# Patient Record
Sex: FEMALE | Race: BLACK OR AFRICAN AMERICAN | NOT HISPANIC OR LATINO | Employment: FULL TIME | URBAN - METROPOLITAN AREA
[De-identification: names, ages, dates, MRNs, and addresses within clinical notes are randomized per-mention and may not be internally consistent; named-entity substitution may affect disease eponyms.]

---

## 2021-05-10 ENCOUNTER — TELEPHONE (OUTPATIENT)
Dept: PRIMARY CARE CLINIC | Facility: CLINIC | Age: 46
End: 2021-05-10

## 2021-05-10 ENCOUNTER — OFFICE VISIT (OUTPATIENT)
Dept: PRIMARY CARE CLINIC | Facility: CLINIC | Age: 46
End: 2021-05-10
Payer: MEDICAID

## 2021-05-10 DIAGNOSIS — Z13.6 SCREENING FOR CARDIOVASCULAR CONDITION: ICD-10-CM

## 2021-05-10 DIAGNOSIS — N89.8 VAGINAL DISCHARGE: ICD-10-CM

## 2021-05-10 DIAGNOSIS — N76.0 BACTERIAL VAGINITIS: Primary | ICD-10-CM

## 2021-05-10 DIAGNOSIS — M62.838 MUSCLE SPASM: ICD-10-CM

## 2021-05-10 DIAGNOSIS — B96.89 BACTERIAL VAGINITIS: Primary | ICD-10-CM

## 2021-05-10 DIAGNOSIS — Z11.4 SCREENING FOR HIV (HUMAN IMMUNODEFICIENCY VIRUS): ICD-10-CM

## 2021-05-10 DIAGNOSIS — Z86.39 PERSONAL HISTORY OF ENDOCRINE DISORDER: ICD-10-CM

## 2021-05-10 DIAGNOSIS — R53.83 FATIGUE, UNSPECIFIED TYPE: ICD-10-CM

## 2021-05-10 DIAGNOSIS — I10 ESSENTIAL HYPERTENSION: ICD-10-CM

## 2021-05-10 DIAGNOSIS — Z11.59 ENCOUNTER FOR HEPATITIS C SCREENING TEST FOR LOW RISK PATIENT: ICD-10-CM

## 2021-05-10 DIAGNOSIS — Z11.3 SCREENING EXAMINATION FOR VENEREAL DISEASE: ICD-10-CM

## 2021-05-10 DIAGNOSIS — Z91.119 NONCOMPLIANCE OF PATIENT WITH DIETARY REGIMEN: ICD-10-CM

## 2021-05-10 DIAGNOSIS — Z72.89 OTHER PROBLEMS RELATED TO LIFESTYLE: ICD-10-CM

## 2021-05-10 PROBLEM — M75.101 ROTATOR CUFF TEAR, NON-TRAUMATIC, RIGHT: Status: ACTIVE | Noted: 2021-03-16

## 2021-05-10 PROBLEM — F33.1 MODERATE EPISODE OF RECURRENT MAJOR DEPRESSIVE DISORDER: Status: ACTIVE | Noted: 2020-08-05

## 2021-05-10 PROBLEM — M25.512 CHRONIC LEFT SHOULDER PAIN: Status: ACTIVE | Noted: 2020-08-05

## 2021-05-10 PROBLEM — F17.200 SMOKER: Status: ACTIVE | Noted: 2021-05-10

## 2021-05-10 PROBLEM — G89.29 CHRONIC LEFT SHOULDER PAIN: Status: ACTIVE | Noted: 2020-08-05

## 2021-05-10 PROCEDURE — 99204 OFFICE O/P NEW MOD 45 MIN: CPT | Mod: 95,,, | Performed by: NURSE PRACTITIONER

## 2021-05-10 PROCEDURE — 99204 PR OFFICE/OUTPT VISIT, NEW, LEVL IV, 45-59 MIN: ICD-10-PCS | Mod: 95,,, | Performed by: NURSE PRACTITIONER

## 2021-05-10 RX ORDER — IBUPROFEN 800 MG/1
800 TABLET ORAL EVERY 8 HOURS PRN
COMMUNITY
Start: 2021-04-27 | End: 2021-06-17 | Stop reason: SDUPTHER

## 2021-05-10 RX ORDER — VARENICLINE TARTRATE 1 MG/1
1 TABLET, FILM COATED ORAL
COMMUNITY
Start: 2021-03-04 | End: 2021-06-02

## 2021-05-10 RX ORDER — PANTOPRAZOLE SODIUM 20 MG/1
20 TABLET, DELAYED RELEASE ORAL
COMMUNITY
Start: 2021-03-04 | End: 2023-04-28 | Stop reason: SDUPTHER

## 2021-05-10 RX ORDER — TIZANIDINE 4 MG/1
4 TABLET ORAL EVERY 8 HOURS
Qty: 90 TABLET | Refills: 1 | Status: SHIPPED | OUTPATIENT
Start: 2021-05-10 | End: 2021-07-14

## 2021-05-10 RX ORDER — ALBUTEROL SULFATE 90 UG/1
2 AEROSOL, METERED RESPIRATORY (INHALATION) EVERY 4 HOURS PRN
COMMUNITY
Start: 2020-11-21 | End: 2023-04-27 | Stop reason: SDUPTHER

## 2021-05-10 RX ORDER — AMLODIPINE BESYLATE 5 MG/1
5 TABLET ORAL
COMMUNITY
Start: 2021-03-04 | End: 2022-07-22 | Stop reason: SDUPTHER

## 2021-05-11 ENCOUNTER — TELEPHONE (OUTPATIENT)
Dept: PRIMARY CARE CLINIC | Facility: CLINIC | Age: 46
End: 2021-05-11

## 2021-05-11 RX ORDER — METRONIDAZOLE 500 MG/1
500 TABLET ORAL EVERY 12 HOURS
Qty: 14 TABLET | Refills: 0 | Status: SHIPPED | OUTPATIENT
Start: 2021-05-11 | End: 2021-05-18

## 2021-06-15 ENCOUNTER — LAB VISIT (OUTPATIENT)
Dept: LAB | Facility: HOSPITAL | Age: 46
End: 2021-06-15
Attending: NURSE PRACTITIONER
Payer: MEDICAID

## 2021-06-15 DIAGNOSIS — I10 ESSENTIAL HYPERTENSION: ICD-10-CM

## 2021-06-15 DIAGNOSIS — Z13.6 SCREENING FOR CARDIOVASCULAR CONDITION: ICD-10-CM

## 2021-06-15 DIAGNOSIS — Z11.4 SCREENING FOR HIV (HUMAN IMMUNODEFICIENCY VIRUS): ICD-10-CM

## 2021-06-15 DIAGNOSIS — R53.83 FATIGUE, UNSPECIFIED TYPE: ICD-10-CM

## 2021-06-15 DIAGNOSIS — Z72.89 OTHER PROBLEMS RELATED TO LIFESTYLE: ICD-10-CM

## 2021-06-15 DIAGNOSIS — Z86.39 PERSONAL HISTORY OF ENDOCRINE DISORDER: ICD-10-CM

## 2021-06-15 DIAGNOSIS — Z11.3 SCREENING EXAMINATION FOR VENEREAL DISEASE: ICD-10-CM

## 2021-06-15 LAB
ALBUMIN SERPL BCP-MCNC: 4.1 G/DL (ref 3.5–5.2)
ALP SERPL-CCNC: 55 U/L (ref 55–135)
ALT SERPL W/O P-5'-P-CCNC: 12 U/L (ref 10–44)
ANION GAP SERPL CALC-SCNC: 10 MMOL/L (ref 8–16)
AST SERPL-CCNC: 14 U/L (ref 10–40)
BASOPHILS # BLD AUTO: 0.03 K/UL (ref 0–0.2)
BASOPHILS NFR BLD: 0.4 % (ref 0–1.9)
BILIRUB SERPL-MCNC: 0.3 MG/DL (ref 0.1–1)
BUN SERPL-MCNC: 9 MG/DL (ref 6–20)
CALCIUM SERPL-MCNC: 9 MG/DL (ref 8.7–10.5)
CHLORIDE SERPL-SCNC: 106 MMOL/L (ref 95–110)
CO2 SERPL-SCNC: 22 MMOL/L (ref 23–29)
CREAT SERPL-MCNC: 0.7 MG/DL (ref 0.5–1.4)
DIFFERENTIAL METHOD: NORMAL
EOSINOPHIL # BLD AUTO: 0.1 K/UL (ref 0–0.5)
EOSINOPHIL NFR BLD: 1.6 % (ref 0–8)
ERYTHROCYTE [DISTWIDTH] IN BLOOD BY AUTOMATED COUNT: 14 % (ref 11.5–14.5)
EST. GFR  (AFRICAN AMERICAN): >60 ML/MIN/1.73 M^2
EST. GFR  (NON AFRICAN AMERICAN): >60 ML/MIN/1.73 M^2
GLUCOSE SERPL-MCNC: 99 MG/DL (ref 70–110)
HCT VFR BLD AUTO: 39.1 % (ref 37–48.5)
HGB BLD-MCNC: 12.5 G/DL (ref 12–16)
HIV 1+2 AB+HIV1 P24 AG SERPL QL IA: NEGATIVE
IMM GRANULOCYTES # BLD AUTO: 0.02 K/UL (ref 0–0.04)
IMM GRANULOCYTES NFR BLD AUTO: 0.3 % (ref 0–0.5)
LYMPHOCYTES # BLD AUTO: 1.9 K/UL (ref 1–4.8)
LYMPHOCYTES NFR BLD: 26.4 % (ref 18–48)
MCH RBC QN AUTO: 29.1 PG (ref 27–31)
MCHC RBC AUTO-ENTMCNC: 32 G/DL (ref 32–36)
MCV RBC AUTO: 91 FL (ref 82–98)
MONOCYTES # BLD AUTO: 0.4 K/UL (ref 0.3–1)
MONOCYTES NFR BLD: 6.1 % (ref 4–15)
NEUTROPHILS # BLD AUTO: 4.6 K/UL (ref 1.8–7.7)
NEUTROPHILS NFR BLD: 65.2 % (ref 38–73)
NRBC BLD-RTO: 0 /100 WBC
PLATELET # BLD AUTO: 313 K/UL (ref 150–450)
PMV BLD AUTO: 10.4 FL (ref 9.2–12.9)
POTASSIUM SERPL-SCNC: 3.7 MMOL/L (ref 3.5–5.1)
PROT SERPL-MCNC: 7.4 G/DL (ref 6–8.4)
RBC # BLD AUTO: 4.29 M/UL (ref 4–5.4)
SODIUM SERPL-SCNC: 138 MMOL/L (ref 136–145)
TRIGL SERPL-MCNC: 105 MG/DL (ref 30–150)
WBC # BLD AUTO: 7.09 K/UL (ref 3.9–12.7)

## 2021-06-15 PROCEDURE — 84443 ASSAY THYROID STIM HORMONE: CPT | Performed by: NURSE PRACTITIONER

## 2021-06-15 PROCEDURE — 80074 ACUTE HEPATITIS PANEL: CPT | Performed by: NURSE PRACTITIONER

## 2021-06-15 PROCEDURE — 36415 COLL VENOUS BLD VENIPUNCTURE: CPT | Mod: PN | Performed by: NURSE PRACTITIONER

## 2021-06-15 PROCEDURE — 86592 SYPHILIS TEST NON-TREP QUAL: CPT | Performed by: NURSE PRACTITIONER

## 2021-06-15 PROCEDURE — 80061 LIPID PANEL: CPT | Performed by: NURSE PRACTITIONER

## 2021-06-15 PROCEDURE — 80053 COMPREHEN METABOLIC PANEL: CPT | Performed by: NURSE PRACTITIONER

## 2021-06-15 PROCEDURE — 85025 COMPLETE CBC W/AUTO DIFF WBC: CPT | Performed by: NURSE PRACTITIONER

## 2021-06-15 PROCEDURE — 86703 HIV-1/HIV-2 1 RESULT ANTBDY: CPT | Performed by: NURSE PRACTITIONER

## 2021-06-16 LAB
RPR SER QL: NORMAL
TSH SERPL DL<=0.005 MIU/L-ACNC: 0.79 UIU/ML (ref 0.4–4)

## 2021-06-17 ENCOUNTER — OFFICE VISIT (OUTPATIENT)
Dept: PRIMARY CARE CLINIC | Facility: CLINIC | Age: 46
End: 2021-06-17
Payer: MEDICAID

## 2021-06-17 DIAGNOSIS — G62.9 NEUROPATHY: ICD-10-CM

## 2021-06-17 DIAGNOSIS — M25.512 CHRONIC LEFT SHOULDER PAIN: ICD-10-CM

## 2021-06-17 DIAGNOSIS — G89.29 CHRONIC LEFT SHOULDER PAIN: ICD-10-CM

## 2021-06-17 DIAGNOSIS — F17.210 CIGARETTE NICOTINE DEPENDENCE WITHOUT COMPLICATION: Primary | ICD-10-CM

## 2021-06-17 DIAGNOSIS — F41.8 DEPRESSION WITH ANXIETY: ICD-10-CM

## 2021-06-17 LAB
HAV IGM SERPL QL IA: NEGATIVE
HBV CORE IGM SERPL QL IA: NEGATIVE
HBV SURFACE AG SERPL QL IA: NEGATIVE
HCV AB SERPL QL IA: NEGATIVE

## 2021-06-17 PROCEDURE — 99213 OFFICE O/P EST LOW 20 MIN: CPT | Mod: 95,,, | Performed by: NURSE PRACTITIONER

## 2021-06-17 PROCEDURE — 99213 PR OFFICE/OUTPT VISIT, EST, LEVL III, 20-29 MIN: ICD-10-PCS | Mod: 95,,, | Performed by: NURSE PRACTITIONER

## 2021-06-17 RX ORDER — IBUPROFEN 800 MG/1
800 TABLET ORAL EVERY 8 HOURS PRN
Qty: 60 TABLET | Refills: 3 | Status: SHIPPED | OUTPATIENT
Start: 2021-06-17 | End: 2021-06-17

## 2021-06-17 RX ORDER — BUPROPION HYDROCHLORIDE 150 MG/1
TABLET ORAL
Qty: 49 TABLET | Refills: 3 | Status: SHIPPED | OUTPATIENT
Start: 2021-06-17 | End: 2022-08-24 | Stop reason: ALTCHOICE

## 2021-06-17 RX ORDER — IBUPROFEN 800 MG/1
800 TABLET ORAL EVERY 8 HOURS PRN
Qty: 60 TABLET | Refills: 0 | Status: SHIPPED | OUTPATIENT
Start: 2021-06-17 | End: 2023-04-27 | Stop reason: SDUPTHER

## 2021-06-17 RX ORDER — IBUPROFEN 800 MG/1
800 TABLET ORAL 3 TIMES DAILY
Qty: 30 TABLET | Refills: 0 | Status: SHIPPED | OUTPATIENT
Start: 2021-06-17 | End: 2021-06-17

## 2021-06-17 RX ORDER — GABAPENTIN 300 MG/1
300 CAPSULE ORAL NIGHTLY
Qty: 30 CAPSULE | Refills: 2 | Status: SHIPPED | OUTPATIENT
Start: 2021-06-17 | End: 2022-06-17 | Stop reason: SDUPTHER

## 2021-06-18 ENCOUNTER — TELEPHONE (OUTPATIENT)
Dept: PRIMARY CARE CLINIC | Facility: CLINIC | Age: 46
End: 2021-06-18

## 2021-06-29 ENCOUNTER — TELEPHONE (OUTPATIENT)
Dept: SMOKING CESSATION | Facility: CLINIC | Age: 46
End: 2021-06-29

## 2021-07-01 ENCOUNTER — PATIENT MESSAGE (OUTPATIENT)
Dept: ADMINISTRATIVE | Facility: OTHER | Age: 46
End: 2021-07-01

## 2021-07-14 DIAGNOSIS — M62.838 MUSCLE SPASM: ICD-10-CM

## 2021-07-14 RX ORDER — TIZANIDINE 4 MG/1
TABLET ORAL
Qty: 90 TABLET | Refills: 1 | Status: SHIPPED | OUTPATIENT
Start: 2021-07-14 | End: 2022-06-17 | Stop reason: SDUPTHER

## 2021-08-12 RX ORDER — IBUPROFEN 800 MG/1
800 TABLET ORAL EVERY 8 HOURS PRN
Qty: 30 TABLET | Refills: 0 | Status: SHIPPED | OUTPATIENT
Start: 2021-08-12 | End: 2021-11-15

## 2021-08-27 ENCOUNTER — OFFICE VISIT (OUTPATIENT)
Dept: PRIMARY CARE CLINIC | Facility: CLINIC | Age: 46
End: 2021-08-27
Payer: MEDICAID

## 2021-08-27 DIAGNOSIS — R41.3 MEMORY LOSS: ICD-10-CM

## 2021-08-27 DIAGNOSIS — R68.89 FORGETFULNESS: ICD-10-CM

## 2021-08-27 DIAGNOSIS — F41.8 DEPRESSION WITH ANXIETY: Primary | ICD-10-CM

## 2021-08-27 PROCEDURE — 99213 OFFICE O/P EST LOW 20 MIN: CPT | Mod: 95,,, | Performed by: NURSE PRACTITIONER

## 2021-08-27 PROCEDURE — 99213 PR OFFICE/OUTPT VISIT, EST, LEVL III, 20-29 MIN: ICD-10-PCS | Mod: 95,,, | Performed by: NURSE PRACTITIONER

## 2021-09-07 RX ORDER — BUSPIRONE HYDROCHLORIDE 7.5 MG/1
7.5 TABLET ORAL 3 TIMES DAILY
Qty: 90 TABLET | Refills: 1 | Status: SHIPPED | OUTPATIENT
Start: 2021-09-07 | End: 2022-08-24 | Stop reason: ALTCHOICE

## 2021-10-05 ENCOUNTER — OFFICE VISIT (OUTPATIENT)
Dept: PRIMARY CARE CLINIC | Facility: CLINIC | Age: 46
End: 2021-10-05
Payer: MEDICAID

## 2021-10-05 ENCOUNTER — TELEPHONE (OUTPATIENT)
Dept: PRIMARY CARE CLINIC | Facility: CLINIC | Age: 46
End: 2021-10-05

## 2021-10-05 DIAGNOSIS — R05.9 COUGH: ICD-10-CM

## 2021-10-05 DIAGNOSIS — J18.9 PNEUMONIA DUE TO INFECTIOUS ORGANISM, UNSPECIFIED LATERALITY, UNSPECIFIED PART OF LUNG: Primary | ICD-10-CM

## 2021-10-05 PROCEDURE — 99213 PR OFFICE/OUTPT VISIT, EST, LEVL III, 20-29 MIN: ICD-10-PCS | Mod: 95,,, | Performed by: NURSE PRACTITIONER

## 2021-10-05 PROCEDURE — 99213 OFFICE O/P EST LOW 20 MIN: CPT | Mod: 95,,, | Performed by: NURSE PRACTITIONER

## 2021-10-05 RX ORDER — CODEINE PHOSPHATE AND GUAIFENESIN 10; 100 MG/5ML; MG/5ML
10 SOLUTION ORAL EVERY 6 HOURS PRN
Qty: 120 ML | Refills: 0 | Status: SHIPPED | OUTPATIENT
Start: 2021-10-05 | End: 2021-10-07 | Stop reason: SDUPTHER

## 2021-10-05 RX ORDER — ALBUTEROL SULFATE 90 UG/1
2 AEROSOL, METERED RESPIRATORY (INHALATION) EVERY 6 HOURS PRN
Qty: 18 G | Refills: 0 | Status: SHIPPED | OUTPATIENT
Start: 2021-10-05 | End: 2022-10-05

## 2021-10-05 RX ORDER — AZITHROMYCIN 500 MG/1
500 TABLET, FILM COATED ORAL DAILY
Qty: 5 TABLET | Refills: 0 | Status: SHIPPED | OUTPATIENT
Start: 2021-10-05 | End: 2021-10-10

## 2021-10-12 RX ORDER — CODEINE PHOSPHATE AND GUAIFENESIN 10; 100 MG/5ML; MG/5ML
10 SOLUTION ORAL EVERY 6 HOURS PRN
Qty: 120 ML | Refills: 0 | Status: SHIPPED | OUTPATIENT
Start: 2021-10-12 | End: 2022-06-17

## 2021-10-22 ENCOUNTER — OFFICE VISIT (OUTPATIENT)
Dept: PRIMARY CARE CLINIC | Facility: CLINIC | Age: 46
End: 2021-10-22
Payer: MEDICAID

## 2021-10-22 VITALS
SYSTOLIC BLOOD PRESSURE: 133 MMHG | HEART RATE: 87 BPM | TEMPERATURE: 99 F | OXYGEN SATURATION: 99 % | WEIGHT: 182 LBS | DIASTOLIC BLOOD PRESSURE: 88 MMHG | BODY MASS INDEX: 35.73 KG/M2 | HEIGHT: 60 IN

## 2021-10-22 DIAGNOSIS — Z12.31 BREAST CANCER SCREENING BY MAMMOGRAM: ICD-10-CM

## 2021-10-22 DIAGNOSIS — R05.9 COUGH: ICD-10-CM

## 2021-10-22 DIAGNOSIS — M79.18 MYOFASCIAL PAIN ON LEFT SIDE: Primary | ICD-10-CM

## 2021-10-22 PROCEDURE — 20552 NJX 1/MLT TRIGGER POINT 1/2: CPT | Mod: PBBFAC,PN | Performed by: FAMILY MEDICINE

## 2021-10-22 PROCEDURE — 99999 PR PBB SHADOW E&M-EST. PATIENT-LVL IV: CPT | Mod: PBBFAC,,, | Performed by: FAMILY MEDICINE

## 2021-10-22 PROCEDURE — 99214 OFFICE O/P EST MOD 30 MIN: CPT | Mod: PBBFAC,PN,25 | Performed by: FAMILY MEDICINE

## 2021-10-22 PROCEDURE — 99214 OFFICE O/P EST MOD 30 MIN: CPT | Mod: S$PBB,25,, | Performed by: FAMILY MEDICINE

## 2021-10-22 PROCEDURE — 20552 TRIGGER POINT INJECTION: ICD-10-PCS | Mod: S$PBB,,, | Performed by: FAMILY MEDICINE

## 2021-10-22 PROCEDURE — 99999 PR PBB SHADOW E&M-EST. PATIENT-LVL IV: ICD-10-PCS | Mod: PBBFAC,,, | Performed by: FAMILY MEDICINE

## 2021-10-22 PROCEDURE — 99214 PR OFFICE/OUTPT VISIT, EST, LEVL IV, 30-39 MIN: ICD-10-PCS | Mod: S$PBB,25,, | Performed by: FAMILY MEDICINE

## 2021-10-22 RX ORDER — TRIAMCINOLONE ACETONIDE 40 MG/ML
40 INJECTION, SUSPENSION INTRA-ARTICULAR; INTRAMUSCULAR
Status: DISCONTINUED | OUTPATIENT
Start: 2021-10-22 | End: 2021-10-22 | Stop reason: HOSPADM

## 2021-10-22 RX ORDER — LIDOCAINE HYDROCHLORIDE 10 MG/ML
5 INJECTION INFILTRATION; PERINEURAL
Status: DISCONTINUED | OUTPATIENT
Start: 2021-10-22 | End: 2021-10-22 | Stop reason: HOSPADM

## 2021-10-22 RX ORDER — BENZONATATE 100 MG/1
100 CAPSULE ORAL 3 TIMES DAILY PRN
Qty: 30 CAPSULE | Refills: 0 | Status: SHIPPED | OUTPATIENT
Start: 2021-10-22 | End: 2021-11-01

## 2021-10-22 RX ADMIN — LIDOCAINE HYDROCHLORIDE 5 ML: 10 INJECTION, SOLUTION INFILTRATION; PERINEURAL at 01:10

## 2021-10-22 RX ADMIN — TRIAMCINOLONE ACETONIDE 40 MG: 40 INJECTION, SUSPENSION INTRA-ARTICULAR; INTRAMUSCULAR at 01:10

## 2021-10-25 ENCOUNTER — TELEPHONE (OUTPATIENT)
Dept: PRIMARY CARE CLINIC | Facility: CLINIC | Age: 46
End: 2021-10-25
Payer: MEDICAID

## 2021-10-27 ENCOUNTER — PATIENT MESSAGE (OUTPATIENT)
Dept: PRIMARY CARE CLINIC | Facility: CLINIC | Age: 46
End: 2021-10-27
Payer: MEDICAID

## 2021-12-16 ENCOUNTER — PATIENT MESSAGE (OUTPATIENT)
Dept: ADMINISTRATIVE | Facility: HOSPITAL | Age: 46
End: 2021-12-16
Payer: MEDICAID

## 2021-12-16 ENCOUNTER — PATIENT OUTREACH (OUTPATIENT)
Dept: ADMINISTRATIVE | Facility: HOSPITAL | Age: 46
End: 2021-12-16
Payer: MEDICAID

## 2022-01-18 ENCOUNTER — TELEPHONE (OUTPATIENT)
Dept: PRIMARY CARE CLINIC | Facility: CLINIC | Age: 47
End: 2022-01-18
Payer: MEDICAID

## 2022-01-18 NOTE — TELEPHONE ENCOUNTER
Called patient to schedule her virtual appointment for 1/19 at [facility] she voiced understanding.      ----- Message from Liyah Greer sent at 1/18/2022  7:50 AM CST -----  Contact: pt [phone]  Requesting to speak with you regarding tubal reversal    Please call and advise.    Thank You

## 2022-01-19 ENCOUNTER — PATIENT MESSAGE (OUTPATIENT)
Dept: PRIMARY CARE CLINIC | Facility: CLINIC | Age: 47
End: 2022-01-19

## 2022-01-19 ENCOUNTER — LAB VISIT (OUTPATIENT)
Dept: LAB | Facility: HOSPITAL | Age: 47
End: 2022-01-19
Attending: NURSE PRACTITIONER
Payer: MEDICAID

## 2022-01-19 ENCOUNTER — OFFICE VISIT (OUTPATIENT)
Dept: PRIMARY CARE CLINIC | Facility: CLINIC | Age: 47
End: 2022-01-19
Payer: MEDICAID

## 2022-01-19 DIAGNOSIS — E66.9 CLASS 2 OBESITY WITH BODY MASS INDEX (BMI) OF 35.0 TO 35.9 IN ADULT, UNSPECIFIED OBESITY TYPE, UNSPECIFIED WHETHER SERIOUS COMORBIDITY PRESENT: Primary | ICD-10-CM

## 2022-01-19 DIAGNOSIS — N92.6 ABNORMAL MENSTRUAL PERIODS: ICD-10-CM

## 2022-01-19 DIAGNOSIS — Z00.00 GENERAL MEDICAL EXAM: ICD-10-CM

## 2022-01-19 LAB
ALBUMIN SERPL BCP-MCNC: 4.1 G/DL (ref 3.5–5.2)
ALP SERPL-CCNC: 49 U/L (ref 55–135)
ALT SERPL W/O P-5'-P-CCNC: 8 U/L (ref 10–44)
ANION GAP SERPL CALC-SCNC: 11 MMOL/L (ref 8–16)
AST SERPL-CCNC: 13 U/L (ref 10–40)
BASOPHILS # BLD AUTO: 0.02 K/UL (ref 0–0.2)
BASOPHILS NFR BLD: 0.2 % (ref 0–1.9)
BILIRUB SERPL-MCNC: 0.4 MG/DL (ref 0.1–1)
BUN SERPL-MCNC: 8 MG/DL (ref 6–20)
CALCIUM SERPL-MCNC: 9.2 MG/DL (ref 8.7–10.5)
CHLORIDE SERPL-SCNC: 104 MMOL/L (ref 95–110)
CO2 SERPL-SCNC: 23 MMOL/L (ref 23–29)
CREAT SERPL-MCNC: 0.6 MG/DL (ref 0.5–1.4)
DIFFERENTIAL METHOD: ABNORMAL
EOSINOPHIL # BLD AUTO: 0.1 K/UL (ref 0–0.5)
EOSINOPHIL NFR BLD: 1.4 % (ref 0–8)
ERYTHROCYTE [DISTWIDTH] IN BLOOD BY AUTOMATED COUNT: 14.4 % (ref 11.5–14.5)
EST. GFR  (AFRICAN AMERICAN): >60 ML/MIN/1.73 M^2
EST. GFR  (NON AFRICAN AMERICAN): >60 ML/MIN/1.73 M^2
FSH SERPL-ACNC: 3.68 MIU/ML
GLUCOSE SERPL-MCNC: 73 MG/DL (ref 70–110)
HCT VFR BLD AUTO: 37 % (ref 37–48.5)
HGB BLD-MCNC: 11.6 G/DL (ref 12–16)
IMM GRANULOCYTES # BLD AUTO: 0.02 K/UL (ref 0–0.04)
IMM GRANULOCYTES NFR BLD AUTO: 0.2 % (ref 0–0.5)
LH SERPL-ACNC: 1.8 MIU/ML
LYMPHOCYTES # BLD AUTO: 2.8 K/UL (ref 1–4.8)
LYMPHOCYTES NFR BLD: 30.2 % (ref 18–48)
MCH RBC QN AUTO: 29.2 PG (ref 27–31)
MCHC RBC AUTO-ENTMCNC: 31.4 G/DL (ref 32–36)
MCV RBC AUTO: 93 FL (ref 82–98)
MONOCYTES # BLD AUTO: 0.5 K/UL (ref 0.3–1)
MONOCYTES NFR BLD: 5.7 % (ref 4–15)
NEUTROPHILS # BLD AUTO: 5.7 K/UL (ref 1.8–7.7)
NEUTROPHILS NFR BLD: 62.3 % (ref 38–73)
NRBC BLD-RTO: 0 /100 WBC
PLATELET # BLD AUTO: 332 K/UL (ref 150–450)
PMV BLD AUTO: 10.9 FL (ref 9.2–12.9)
POTASSIUM SERPL-SCNC: 3.5 MMOL/L (ref 3.5–5.1)
PROLACTIN SERPL IA-MCNC: 16.7 NG/ML (ref 5.2–26.5)
PROT SERPL-MCNC: 7.2 G/DL (ref 6–8.4)
RBC # BLD AUTO: 3.97 M/UL (ref 4–5.4)
SODIUM SERPL-SCNC: 138 MMOL/L (ref 136–145)
T4 FREE SERPL-MCNC: 0.76 NG/DL (ref 0.71–1.51)
TSH SERPL DL<=0.005 MIU/L-ACNC: 0.68 UIU/ML (ref 0.4–4)
WBC # BLD AUTO: 9.12 K/UL (ref 3.9–12.7)

## 2022-01-19 PROCEDURE — 85025 COMPLETE CBC W/AUTO DIFF WBC: CPT | Performed by: NURSE PRACTITIONER

## 2022-01-19 PROCEDURE — 80053 COMPREHEN METABOLIC PANEL: CPT | Performed by: NURSE PRACTITIONER

## 2022-01-19 PROCEDURE — 99213 OFFICE O/P EST LOW 20 MIN: CPT | Mod: 95,,, | Performed by: NURSE PRACTITIONER

## 2022-01-19 PROCEDURE — 83002 ASSAY OF GONADOTROPIN (LH): CPT | Performed by: NURSE PRACTITIONER

## 2022-01-19 PROCEDURE — 84146 ASSAY OF PROLACTIN: CPT | Performed by: NURSE PRACTITIONER

## 2022-01-19 PROCEDURE — 83001 ASSAY OF GONADOTROPIN (FSH): CPT | Performed by: NURSE PRACTITIONER

## 2022-01-19 PROCEDURE — 36415 COLL VENOUS BLD VENIPUNCTURE: CPT | Mod: PN | Performed by: NURSE PRACTITIONER

## 2022-01-19 PROCEDURE — 1160F PR REVIEW ALL MEDS BY PRESCRIBER/CLIN PHARMACIST DOCUMENTED: ICD-10-PCS | Mod: CPTII,95,, | Performed by: NURSE PRACTITIONER

## 2022-01-19 PROCEDURE — 84443 ASSAY THYROID STIM HORMONE: CPT | Performed by: NURSE PRACTITIONER

## 2022-01-19 PROCEDURE — 99213 PR OFFICE/OUTPT VISIT, EST, LEVL III, 20-29 MIN: ICD-10-PCS | Mod: 95,,, | Performed by: NURSE PRACTITIONER

## 2022-01-19 PROCEDURE — 1159F MED LIST DOCD IN RCRD: CPT | Mod: CPTII,95,, | Performed by: NURSE PRACTITIONER

## 2022-01-19 PROCEDURE — 84439 ASSAY OF FREE THYROXINE: CPT | Performed by: NURSE PRACTITIONER

## 2022-01-19 PROCEDURE — 1159F PR MEDICATION LIST DOCUMENTED IN MEDICAL RECORD: ICD-10-PCS | Mod: CPTII,95,, | Performed by: NURSE PRACTITIONER

## 2022-01-19 PROCEDURE — 1160F RVW MEDS BY RX/DR IN RCRD: CPT | Mod: CPTII,95,, | Performed by: NURSE PRACTITIONER

## 2022-01-19 NOTE — PROGRESS NOTES
Subjective:       Patient ID: Dalia Ozuna is a 46 y.o. female.    Chief Complaint: Tubal ligation Reversal  The patient location is: [place], [place]  The chief complaint leading to consultation is: Referral to GYN for Tubal Ligation Reversal    Visit type: audiovisual    Face to Face time with patient: 11 min  12 minutes of total time spent on the encounter, which includes face to face time and non-face to face time preparing to see the patient (eg, review of tests), Obtaining and/or reviewing separately obtained history, Documenting clinical information in the electronic or other health record, Independently interpreting results (not separately reported) and communicating results to the patient/family/caregiver, or Care coordination (not separately reported).         Each patient to whom he or she provides medical services by telemedicine is:  (1) informed of the relationship between the physician and patient and the respective role of any other health care provider with respect to management of the patient; and (2) notified that he or she may decline to receive medical services by telemedicine and may withdraw from such care at any time.    Notes:     History of Present Illness:   Dalia Ozuna 46 y.o. female presents today for referral to GYN for tubal ligation reversal and to discuss weight management options. Denies any other problems or concerns at this time.      Past Medical History:   Diagnosis Date    Essential hypertension     Pneumonia      No family history on file.  Social History     Socioeconomic History    Marital status:    Tobacco Use    Smoking status: Current Every Day Smoker     Packs/day: 2.00     Years: 26.00     Pack years: 52.00     Types: Cigarettes     Start date: 5/1/1998    Smokeless tobacco: Never Used    Tobacco comment: currently taking chantix    Substance and Sexual Activity    Alcohol use: Not Currently    Drug use: Never    Sexual activity: Yes      Partners: Male     Social Determinants of Health     Financial Resource Strain: Low Risk     Difficulty of Paying Living Expenses: Not very hard   Food Insecurity: No Food Insecurity    Worried About Running Out of Food in the Last Year: Never true    Ran Out of Food in the Last Year: Never true   Transportation Needs: No Transportation Needs    Lack of Transportation (Medical): No    Lack of Transportation (Non-Medical): No   Physical Activity: Insufficiently Active    Days of Exercise per Week: 1 day    Minutes of Exercise per Session: 60 min   Stress: Stress Concern Present    Feeling of Stress : Very much   Social Connections: Moderately Isolated    Frequency of Communication with Friends and Family: More than three times a week    Frequency of Social Gatherings with Friends and Family: More than three times a week    Attends Restorationist Services: More than 4 times per year    Active Member of Clubs or Organizations: No    Attends Club or Organization Meetings: Never    Marital Status:    Housing Stability: Low Risk     Unable to Pay for Housing in the Last Year: No    Number of Places Lived in the Last Year: 1    Unstable Housing in the Last Year: No     Outpatient Encounter Medications as of 1/19/2022   Medication Sig Dispense Refill    albuterol (PROVENTIL/VENTOLIN HFA) 90 mcg/actuation inhaler Inhale 2 puffs into the lungs every 4 (four) hours as needed.      albuterol (VENTOLIN HFA) 90 mcg/actuation inhaler Inhale 2 puffs into the lungs every 6 (six) hours as needed for Wheezing. Rescue 18 g 0    amLODIPine (NORVASC) 5 MG tablet Take 5 mg by mouth.      buPROPion (WELLBUTRIN XL) 150 MG TB24 tablet Take 1 tablet (150 mg total) by mouth once daily for 7 days, THEN 1 tablet (150 mg total) 2 (two) times daily for 21 days. 49 tablet 3    busPIRone (BUSPAR) 7.5 MG tablet Take 1 tablet (7.5 mg total) by mouth 3 (three) times daily. 90 tablet 1    gabapentin (NEURONTIN) 300 MG capsule  Take 1 capsule (300 mg total) by mouth every evening. 30 capsule 2    guaiFENesin-codeine 100-10 mg/5 ml (TUSSI-ORGANIDIN NR)  mg/5 mL syrup Take 10 mLs by mouth every 6 (six) hours as needed for Cough. 120 mL 0    ibuprofen (ADVIL,MOTRIN) 800 MG tablet TAKE 1 TABLET(800 MG) BY MOUTH EVERY 8 HOURS AS NEEDED FOR PAIN 30 tablet 0    pantoprazole (PROTONIX) 20 MG tablet Take 20 mg by mouth.      tiZANidine (ZANAFLEX) 4 MG tablet TAKE 1 TABLET(4 MG) BY MOUTH EVERY 8 HOURS 90 tablet 1     No facility-administered encounter medications on file as of 1/19/2022.       Review of Systems   Constitutional: Positive for unexpected weight change. Negative for activity change.   HENT: Negative for hearing loss, rhinorrhea and trouble swallowing.    Eyes: Negative for discharge and visual disturbance.   Respiratory: Negative for chest tightness and wheezing.    Cardiovascular: Negative for chest pain and palpitations.   Gastrointestinal: Negative for blood in stool, constipation, diarrhea and vomiting.   Endocrine: Negative for polydipsia and polyuria.   Genitourinary: Negative for difficulty urinating, dysuria, hematuria and menstrual problem.   Musculoskeletal: Negative for arthralgias, joint swelling and neck pain.   Neurological: Negative for weakness and headaches.   Psychiatric/Behavioral: Negative for confusion and dysphoric mood.       Objective:      There were no vitals taken for this visit.  Physical Exam  Constitutional:       Appearance: Normal appearance.   Neurological:      Mental Status: She is alert.         Results for orders placed or performed in visit on 06/15/21   CBC Auto Differential   Result Value Ref Range    WBC 7.09 3.90 - 12.70 K/uL    RBC 4.29 4.00 - 5.40 M/uL    Hemoglobin 12.5 12.0 - 16.0 g/dL    Hematocrit 39.1 37.0 - 48.5 %    MCV 91 82 - 98 fL    MCH 29.1 27.0 - 31.0 pg    MCHC 32.0 32.0 - 36.0 g/dL    RDW 14.0 11.5 - 14.5 %    Platelets 313 150 - 450 K/uL    MPV 10.4 9.2 - 12.9 fL     Immature Granulocytes 0.3 0.0 - 0.5 %    Gran # (ANC) 4.6 1.8 - 7.7 K/uL    Immature Grans (Abs) 0.02 0.00 - 0.04 K/uL    Lymph # 1.9 1.0 - 4.8 K/uL    Mono # 0.4 0.3 - 1.0 K/uL    Eos # 0.1 0.0 - 0.5 K/uL    Baso # 0.03 0.00 - 0.20 K/uL    nRBC 0 0 /100 WBC    Gran % 65.2 38.0 - 73.0 %    Lymph % 26.4 18.0 - 48.0 %    Mono % 6.1 4.0 - 15.0 %    Eosinophil % 1.6 0.0 - 8.0 %    Basophil % 0.4 0.0 - 1.9 %    Differential Method Automated    Comprehensive Metabolic Panel   Result Value Ref Range    Sodium 138 136 - 145 mmol/L    Potassium 3.7 3.5 - 5.1 mmol/L    Chloride 106 95 - 110 mmol/L    CO2 22 (L) 23 - 29 mmol/L    Glucose 99 70 - 110 mg/dL    BUN 9 6 - 20 mg/dL    Creatinine 0.7 0.5 - 1.4 mg/dL    Calcium 9.0 8.7 - 10.5 mg/dL    Total Protein 7.4 6.0 - 8.4 g/dL    Albumin 4.1 3.5 - 5.2 g/dL    Total Bilirubin 0.3 0.1 - 1.0 mg/dL    Alkaline Phosphatase 55 55 - 135 U/L    AST 14 10 - 40 U/L    ALT 12 10 - 44 U/L    Anion Gap 10 8 - 16 mmol/L    eGFR if African American >60 >60 mL/min/1.73 m^2    eGFR if non African American >60 >60 mL/min/1.73 m^2   Lipid Panel   Result Value Ref Range    Triglycerides 105 30 - 150 mg/dL   RPR   Result Value Ref Range    RPR Non-reactive Non-reactive   Hepatitis Panel, Acute   Result Value Ref Range    Hepatitis B Surface Ag Negative Negative    Hep B C IgM Negative Negative    Hep A IgM Negative Negative    Hepatitis C Ab Negative Negative   TSH   Result Value Ref Range    TSH 0.793 0.400 - 4.000 uIU/mL   HIV 1/2 Ag/Ab (4th Gen)   Result Value Ref Range    HIV 1/2 Ag/Ab Negative Negative     Assessment:       1. Class 2 obesity with body mass index (BMI) of 35.0 to 35.9 in adult, unspecified obesity type, unspecified whether serious comorbidity present    2. General medical exam    3. Abnormal menstrual periods        Plan:   Dalia was seen today for tubal ligation reversal.    Diagnoses and all orders for this visit:    Class 2 obesity with body mass index (BMI) of 35.0 to  35.9 in adult, unspecified obesity type, unspecified whether serious comorbidity present    General medical exam  -     CBC Auto Differential; Future  -     Comprehensive Metabolic Panel; Future    Abnormal menstrual periods  -     Ambulatory referral/consult to Gynecology; Future  -     TSH; Future  -     T4, Free; Future  -     Prolactin; Future  -     Follicle Stimulating Hormone; Future  -     Luteinizing Hormone; Future             [facility]   [address]  [place] [address]  Office [phone]  Fax [phone]

## 2022-01-20 DIAGNOSIS — N92.6 ABNORMAL MENSES: Primary | ICD-10-CM

## 2022-01-24 ENCOUNTER — PATIENT MESSAGE (OUTPATIENT)
Dept: PRIMARY CARE CLINIC | Facility: CLINIC | Age: 47
End: 2022-01-24
Payer: MEDICAID

## 2022-03-22 ENCOUNTER — PATIENT MESSAGE (OUTPATIENT)
Dept: ADMINISTRATIVE | Facility: HOSPITAL | Age: 47
End: 2022-03-22
Payer: MEDICAID

## 2022-04-27 ENCOUNTER — TELEPHONE (OUTPATIENT)
Dept: PRIMARY CARE CLINIC | Facility: CLINIC | Age: 47
End: 2022-04-27
Payer: MEDICAID

## 2022-05-09 ENCOUNTER — PATIENT MESSAGE (OUTPATIENT)
Dept: PRIMARY CARE CLINIC | Facility: CLINIC | Age: 47
End: 2022-05-09
Payer: MEDICAID

## 2022-05-09 DIAGNOSIS — G62.9 NEUROPATHY: ICD-10-CM

## 2022-05-09 RX ORDER — IBUPROFEN 800 MG/1
800 TABLET ORAL EVERY 8 HOURS PRN
Qty: 30 TABLET | Refills: 0 | Status: CANCELLED | OUTPATIENT
Start: 2022-05-09

## 2022-05-09 RX ORDER — GABAPENTIN 300 MG/1
300 CAPSULE ORAL NIGHTLY
Qty: 30 CAPSULE | Refills: 2 | Status: CANCELLED | OUTPATIENT
Start: 2022-05-09 | End: 2023-05-09

## 2022-05-09 RX ORDER — CODEINE PHOSPHATE AND GUAIFENESIN 10; 100 MG/5ML; MG/5ML
10 SOLUTION ORAL EVERY 6 HOURS PRN
Qty: 120 ML | Refills: 0 | Status: CANCELLED | OUTPATIENT
Start: 2022-05-09

## 2022-05-12 ENCOUNTER — PATIENT MESSAGE (OUTPATIENT)
Dept: SMOKING CESSATION | Facility: CLINIC | Age: 47
End: 2022-05-12
Payer: MEDICAID

## 2022-06-14 ENCOUNTER — NURSE TRIAGE (OUTPATIENT)
Dept: ADMINISTRATIVE | Facility: CLINIC | Age: 47
End: 2022-06-14
Payer: MEDICAID

## 2022-06-14 ENCOUNTER — HOSPITAL ENCOUNTER (EMERGENCY)
Facility: HOSPITAL | Age: 47
Discharge: HOME | End: 2022-06-14
Attending: EMERGENCY MEDICINE
Payer: MEDICAID

## 2022-06-14 ENCOUNTER — TELEPHONE (OUTPATIENT)
Dept: PRIMARY CARE CLINIC | Facility: CLINIC | Age: 47
End: 2022-06-14
Payer: MEDICAID

## 2022-06-14 VITALS
SYSTOLIC BLOOD PRESSURE: 145 MMHG | TEMPERATURE: 98 F | OXYGEN SATURATION: 100 % | WEIGHT: 182.56 LBS | RESPIRATION RATE: 20 BRPM | DIASTOLIC BLOOD PRESSURE: 86 MMHG | BODY MASS INDEX: 35.65 KG/M2 | HEART RATE: 83 BPM

## 2022-06-14 DIAGNOSIS — R53.83 FATIGUE, UNSPECIFIED TYPE: ICD-10-CM

## 2022-06-14 DIAGNOSIS — R07.9 CHEST PAIN: ICD-10-CM

## 2022-06-14 DIAGNOSIS — I10 ESSENTIAL HYPERTENSION: Primary | ICD-10-CM

## 2022-06-14 DIAGNOSIS — Z20.822 LAB TEST NEGATIVE FOR COVID-19 VIRUS: ICD-10-CM

## 2022-06-14 LAB
ALBUMIN SERPL BCP-MCNC: 3.8 G/DL (ref 3.5–5.2)
ALP SERPL-CCNC: 49 U/L (ref 55–135)
ALT SERPL W/O P-5'-P-CCNC: 7 U/L (ref 10–44)
ANION GAP SERPL CALC-SCNC: 8 MMOL/L (ref 8–16)
AST SERPL-CCNC: 12 U/L (ref 10–40)
BASOPHILS # BLD AUTO: 0.02 K/UL (ref 0–0.2)
BASOPHILS NFR BLD: 0.3 % (ref 0–1.9)
BILIRUB SERPL-MCNC: 0.2 MG/DL (ref 0.1–1)
BUN SERPL-MCNC: 8 MG/DL (ref 6–20)
CALCIUM SERPL-MCNC: 8.8 MG/DL (ref 8.7–10.5)
CHLORIDE SERPL-SCNC: 111 MMOL/L (ref 95–110)
CO2 SERPL-SCNC: 21 MMOL/L (ref 23–29)
CREAT SERPL-MCNC: 0.7 MG/DL (ref 0.5–1.4)
CTP QC/QA: YES
DIFFERENTIAL METHOD: ABNORMAL
EOSINOPHIL # BLD AUTO: 0.1 K/UL (ref 0–0.5)
EOSINOPHIL NFR BLD: 1.8 % (ref 0–8)
ERYTHROCYTE [DISTWIDTH] IN BLOOD BY AUTOMATED COUNT: 14.4 % (ref 11.5–14.5)
EST. GFR  (AFRICAN AMERICAN): >60 ML/MIN/1.73 M^2
EST. GFR  (NON AFRICAN AMERICAN): >60 ML/MIN/1.73 M^2
GLUCOSE SERPL-MCNC: 94 MG/DL (ref 70–110)
HCT VFR BLD AUTO: 38.3 % (ref 37–48.5)
HGB BLD-MCNC: 12.2 G/DL (ref 12–16)
IMM GRANULOCYTES # BLD AUTO: 0.01 K/UL (ref 0–0.04)
IMM GRANULOCYTES NFR BLD AUTO: 0.1 % (ref 0–0.5)
LYMPHOCYTES # BLD AUTO: 2.7 K/UL (ref 1–4.8)
LYMPHOCYTES NFR BLD: 37.8 % (ref 18–48)
MCH RBC QN AUTO: 29 PG (ref 27–31)
MCHC RBC AUTO-ENTMCNC: 31.9 G/DL (ref 32–36)
MCV RBC AUTO: 91 FL (ref 82–98)
MONOCYTES # BLD AUTO: 0.5 K/UL (ref 0.3–1)
MONOCYTES NFR BLD: 6.7 % (ref 4–15)
NEUTROPHILS # BLD AUTO: 3.8 K/UL (ref 1.8–7.7)
NEUTROPHILS NFR BLD: 53.3 % (ref 38–73)
NRBC BLD-RTO: 0 /100 WBC
PLATELET # BLD AUTO: 285 K/UL (ref 150–450)
PMV BLD AUTO: 10.1 FL (ref 9.2–12.9)
POTASSIUM SERPL-SCNC: 3.8 MMOL/L (ref 3.5–5.1)
PROT SERPL-MCNC: 6.9 G/DL (ref 6–8.4)
RBC # BLD AUTO: 4.2 M/UL (ref 4–5.4)
SARS-COV-2 RDRP RESP QL NAA+PROBE: NEGATIVE
SODIUM SERPL-SCNC: 140 MMOL/L (ref 136–145)
TROPONIN I SERPL DL<=0.01 NG/ML-MCNC: <0.006 NG/ML (ref 0–0.03)
WBC # BLD AUTO: 7.04 K/UL (ref 3.9–12.7)

## 2022-06-14 PROCEDURE — 93005 ELECTROCARDIOGRAM TRACING: CPT

## 2022-06-14 PROCEDURE — 25000003 PHARM REV CODE 250: Performed by: EMERGENCY MEDICINE

## 2022-06-14 PROCEDURE — 80053 COMPREHEN METABOLIC PANEL: CPT | Performed by: EMERGENCY MEDICINE

## 2022-06-14 PROCEDURE — 93010 ELECTROCARDIOGRAM REPORT: CPT | Mod: ,,, | Performed by: INTERNAL MEDICINE

## 2022-06-14 PROCEDURE — U0002 COVID-19 LAB TEST NON-CDC: HCPCS | Performed by: EMERGENCY MEDICINE

## 2022-06-14 PROCEDURE — 84484 ASSAY OF TROPONIN QUANT: CPT | Performed by: EMERGENCY MEDICINE

## 2022-06-14 PROCEDURE — 99284 EMERGENCY DEPT VISIT MOD MDM: CPT | Mod: 25

## 2022-06-14 PROCEDURE — 85025 COMPLETE CBC W/AUTO DIFF WBC: CPT | Performed by: EMERGENCY MEDICINE

## 2022-06-14 PROCEDURE — 93010 EKG 12-LEAD: ICD-10-PCS | Mod: ,,, | Performed by: INTERNAL MEDICINE

## 2022-06-14 RX ORDER — AMLODIPINE BESYLATE 5 MG/1
5 TABLET ORAL
Status: COMPLETED | OUTPATIENT
Start: 2022-06-14 | End: 2022-06-14

## 2022-06-14 RX ADMIN — AMLODIPINE BESYLATE 5 MG: 5 TABLET ORAL at 12:06

## 2022-06-14 NOTE — Clinical Note
"Dalia Murphy" Sulma was seen and treated in our emergency department on 6/14/2022.  She may return to work on 06/15/2022.       If you have any questions or concerns, please don't hesitate to call.      Oliver Carlton MD"

## 2022-06-14 NOTE — Clinical Note
"Dalia Murphy" Sulma was seen and treated in our emergency department on 6/14/2022.  She may return to work on 06/16/2022.       If you have any questions or concerns, please don't hesitate to call.      Prosper HOROWITZ    "

## 2022-06-14 NOTE — ED PROVIDER NOTES
"SCRIBE #1 NOTE: I, Guillermo Feli, am scribing for, and in the presence of, Oliver Carlton MD. I have scribed the entire note.       History     Chief Complaint   Patient presents with    Chest Pain     Pt states she woke up feeling "off" this AM, states she checked her BP at work and it was 160/90's, states she is having right-sided chest pain worsens with movement and deep inspiration and a headache, also c/o nausea     Review of patient's allergies indicates:   Allergen Reactions    Penicillins Hives    Aspirin Other (See Comments)     Stomach Pain ( Sharp )          History of Present Illness     HPI    2022, 10:04 AM  History obtained from the patient      History of Present Illness: Dalia Ozuna is a 46 y.o. female patient with a PMHx of HTN who presents to the Emergency Department for evaluation of  pt woke up at 4:45AM "feeling a little off". Pt checked BP at home and reports BP of 160/103. Pt also c/o of sharp CP, nausea, ear ache, and fatigue. Pt notes that she had discomfort in her right arm during episodes of CP, but is unsure if it is related to her hx of right rotator cuff repair. Pt took amlodipine 5mg and tylenol this AM. Symptoms are intermittent and moderate in severity. No mitigating or exacerbating factors reported. Associated sxs include chest tightness, nausea, right ear pain, right arm pain,and dry cough . Patient denies any fever,chills, leg swelling, pain with inspiration, vomiting, and all other sxs at this time. No prior Tx. No further complaints or concerns at this time.       Arrival mode: Personal vehicle      PCP: Franci Arteaga MD        Past Medical History:  Past Medical History:   Diagnosis Date    Essential hypertension     Pneumonia        Past Surgical History:  Past Surgical History:   Procedure Laterality Date     SECTION      ROTATOR CUFF REPAIR Right     ROTATOR CUFF REPAIR Right     TUBAL LIGATION           Family History:  No family history on " file.    Social History:  Social History     Tobacco Use    Smoking status: Current Every Day Smoker     Packs/day: 2.00     Years: 26.00     Pack years: 52.00     Types: Cigarettes     Start date: 5/1/1998    Smokeless tobacco: Never Used    Tobacco comment: currently taking chantix    Substance and Sexual Activity    Alcohol use: Not Currently    Drug use: Never    Sexual activity: Yes     Partners: Male        Review of Systems     Review of Systems   Constitutional: Negative for chills and fever.   HENT: Positive for ear pain (right ear ). Negative for sore throat.    Respiratory: Positive for cough (dry ) and chest tightness. Negative for shortness of breath.         (-) pain with inspiration   Cardiovascular: Positive for chest pain. Negative for leg swelling.   Gastrointestinal: Positive for abdominal pain and nausea. Negative for vomiting.   Genitourinary: Negative for dysuria.   Musculoskeletal: Negative for back pain.        (+) right arm pain   Skin: Negative for rash.   Neurological: Negative for weakness.   Hematological: Does not bruise/bleed easily.   All other systems reviewed and are negative.     Physical Exam     Initial Vitals [06/14/22 0909]   BP Pulse Resp Temp SpO2   (!) 141/97 89 18 98.3 °F (36.8 °C) 98 %      MAP       --          Physical Exam  Nursing Notes and Vital Signs Reviewed.  Constitutional: Patient is in no acute distress. Well-developed and well-nourished.  Head: Atraumatic. Normocephalic.  Eyes: PERRL. EOM intact. Conjunctivae are not pale. No scleral icterus.  ENT: Mucous membranes are moist. Oropharynx is clear and symmetric. bilateral TM WNL   Neck: Supple. Full ROM. No lymphadenopathy.  Cardiovascular: Regular rate. Regular rhythm. No murmurs, rubs, or gallops. Distal pulses are 2+ and symmetric.  Pulmonary/Chest: No respiratory distress. Clear to auscultation bilaterally. No wheezing or rales.  Abdominal: Soft and non-distended.  There is no tenderness.  No rebound,  guarding, or rigidity. Good bowel sounds.  Genitourinary: No CVA tenderness  Musculoskeletal: Moves all extremities. No obvious deformities. No edema. No calf tenderness.  Skin: Warm and dry.  Neurological:  Alert, awake, and appropriate.  Normal speech.  No acute focal neurological deficits are appreciated.  Psychiatric: Normal affect. Good eye contact. Appropriate in content.     ED Course   Procedures  ED Vital Signs:  Vitals:    06/14/22 0909 06/14/22 0945 06/14/22 1000 06/14/22 1030   BP: (!) 141/97 (!) 145/87 (!) 145/89 (!) 148/89   Pulse: 89 79 79 72   Resp: 18 19 19 (!) 23   Temp: 98.3 °F (36.8 °C)      TempSrc: Oral      SpO2: 98% 98% 100% 100%   Weight: 82.8 kg (182 lb 8.7 oz)       06/14/22 1100 06/14/22 1132   BP: (!) 160/97 (!) 152/92   Pulse: 71 77   Resp: (!) 26 (!) 26   Temp:     TempSrc:     SpO2: 100% 98%   Weight:         Abnormal Lab Results:  Labs Reviewed   CBC W/ AUTO DIFFERENTIAL - Abnormal; Notable for the following components:       Result Value    MCHC 31.9 (*)     All other components within normal limits   COMPREHENSIVE METABOLIC PANEL - Abnormal; Notable for the following components:    Chloride 111 (*)     CO2 21 (*)     Alkaline Phosphatase 49 (*)     ALT 7 (*)     All other components within normal limits   TROPONIN I   SARS-COV-2 RNA AMPLIFICATION, QUAL   SARS-COV-2 RDRP GENE    Narrative:     This test utilizes isothermal nucleic acid amplification   technology to detect the SARS-CoV-2 RdRp nucleic acid segment.   The analytical sensitivity (limit of detection) is 125 genome   equivalents/mL.   A POSITIVE result implies infection with the SARS-CoV-2 virus;   the patient is presumed to be contagious.     A NEGATIVE result means that SARS-CoV-2 nucleic acids are not   present above the limit of detection. A NEGATIVE result should be   treated as presumptive. It does not rule out the possibility of   COVID-19 and should not be the sole basis for treatment decisions.   If COVID-19 is  strongly suspected based on clinical and exposure   history, re-testing using an alternate molecular assay should be   considered.   This test is only for use under the Food and Drug   Administration s Emergency Use Authorization (EUA).   Commercial kits are provided by [employer].   Performance characteristics of the EUA have been independently   verified by [facility] Department of   Pathology and Laboratory Medicine.   _________________________________________________________________   The authorized Fact Sheet for Healthcare Providers and the authorized Fact   Sheet for Patients of the ID NOW COVID-19 are available on the FDA   website:     https://www.fda.gov/media/609490/download  https://www.fda.gov/media/715418/download               All Lab Results:  Results for orders placed or performed during the hospital encounter of 06/14/22   CBC auto differential   Result Value Ref Range    WBC 7.04 3.90 - 12.70 K/uL    RBC 4.20 4.00 - 5.40 M/uL    Hemoglobin 12.2 12.0 - 16.0 g/dL    Hematocrit 38.3 37.0 - 48.5 %    MCV 91 82 - 98 fL    MCH 29.0 27.0 - 31.0 pg    MCHC 31.9 (L) 32.0 - 36.0 g/dL    RDW 14.4 11.5 - 14.5 %    Platelets 285 150 - 450 K/uL    MPV 10.1 9.2 - 12.9 fL    Immature Granulocytes 0.1 0.0 - 0.5 %    Gran # (ANC) 3.8 1.8 - 7.7 K/uL    Immature Grans (Abs) 0.01 0.00 - 0.04 K/uL    Lymph # 2.7 1.0 - 4.8 K/uL    Mono # 0.5 0.3 - 1.0 K/uL    Eos # 0.1 0.0 - 0.5 K/uL    Baso # 0.02 0.00 - 0.20 K/uL    nRBC 0 0 /100 WBC    Gran % 53.3 38.0 - 73.0 %    Lymph % 37.8 18.0 - 48.0 %    Mono % 6.7 4.0 - 15.0 %    Eosinophil % 1.8 0.0 - 8.0 %    Basophil % 0.3 0.0 - 1.9 %    Differential Method Automated    Comprehensive metabolic panel   Result Value Ref Range    Sodium 140 136 - 145 mmol/L    Potassium 3.8 3.5 - 5.1 mmol/L    Chloride 111 (H) 95 - 110 mmol/L    CO2 21 (L) 23 - 29 mmol/L    Glucose 94 70 - 110 mg/dL    BUN 8 6 - 20 mg/dL    Creatinine 0.7 0.5 - 1.4 mg/dL    Calcium 8.8 8.7  - 10.5 mg/dL    Total Protein 6.9 6.0 - 8.4 g/dL    Albumin 3.8 3.5 - 5.2 g/dL    Total Bilirubin 0.2 0.1 - 1.0 mg/dL    Alkaline Phosphatase 49 (L) 55 - 135 U/L    AST 12 10 - 40 U/L    ALT 7 (L) 10 - 44 U/L    Anion Gap 8 8 - 16 mmol/L    eGFR if African American >60 >60 mL/min/1.73 m^2    eGFR if non African American >60 >60 mL/min/1.73 m^2   Troponin I   Result Value Ref Range    Troponin I <0.006 0.000 - 0.026 ng/mL   POCT COVID-19 Rapid Screening   Result Value Ref Range    POC Rapid COVID Negative Negative     Acceptable Yes        Imaging Results:  Imaging Results          X-Ray Chest AP Portable (Final result)  Result time 06/14/22 10:23:26    Final result by YOSELYN Ashley Sr., MD (06/14/22 10:23:26)                 Impression:      1. The lungs are clear.  2. There is prominence of the width of the right acromioclavicular joint. It measures 13 mm in width.  .      Electronically signed by: Germán Ashley MD  Date:    06/14/2022  Time:    10:23             Narrative:    EXAMINATION:  XR CHEST AP PORTABLE    CLINICAL HISTORY:  chest pain;    COMPARISON:  None    FINDINGS:  The size of the heart is normal. The lungs are clear. There is no pneumothorax.  The costophrenic angles are sharp.  There is prominence of the width of the right acromioclavicular joint.  It measures 13 mm in width.                                 The EKG was ordered, reviewed, and independently interpreted by the ED provider.  Interpretation time: 9:08  Rate: 85 BPM  Rhythm: normal sinus rhythm  Interpretation: Septal infarct, age undetermined. No STEMI.           The Emergency Provider reviewed the vital signs and test results, which are outlined above.     ED Discussion     11:46 AM: Reassessed pt at this time. Discussed with pt all pertinent ED information and results. Discussed pt dx and plan of tx. Gave pt all f/u and return to the ED instructions. All questions and concerns were addressed at this time. Pt  expresses understanding of information and instructions, and is comfortable with plan to discharge. Pt is stable for discharge.    I discussed with patient and/or family/caretaker that evaluation in the ED does not suggest any emergent or life threatening medical conditions requiring immediate intervention beyond what was provided in the ED, and I believe patient is safe for discharge.  Regardless, an unremarkable evaluation in the ED does not preclude the development or presence of a serious of life threatening condition. As such, patient was instructed to return immediately for any worsening or change in current symptoms.         Medical Decision Making:   Clinical Tests:   Lab Tests: Ordered and Reviewed  Radiological Study: Reviewed and Ordered  Medical Tests: Ordered and Reviewed  Essential HTN.  Patient states that her blood pressure readings and reading 140 systolic and 90 diastolic.  I advised that she increase her amlodipine from 5 mg to 10 mg.  Keep a blood pressure log and follow-up with her primary care physician for continued long-term management essential hypertension     Additional MDM:   PERC Rule:   Age is greater than or equal to 50 = 0.0  Heart Rate is greater than or equal to 100 = 0.0  SaO2 on room air < 95% = 0.0  Unilateral leg swelling = 0.0  Hemoptysis = 0.0  Recent surgery or trauma = 0.0  Prior PE or DVT =  0.0  Hormone use = 0.00  PERC Score = 0    Well's Criteria Score:  -Clinical symptoms of DVT (leg swelling, pain with palpation) = 0.0  -Other diagnosis less likely than pulmonary embolism =            0.0  -Heart Rate >100 =   0.0  -Immobilization (= or > than 3 days) or surgery in the previous 4 weeks = 0.0  -Previous DVT/PE = 0.0  -Hemoptysis =          0.0  -Malignancy =           0.0  Well's Probability Score =    0      Heart Score:    History:          Slightly suspicious.  ECG:             Normal  Age:               45-65 years  Risk factors: 1-2 risk factors  Troponin:        Less than or equal to normal limit  Final Score: 2           ED Medication(s):  Medications   amLODIPine tablet 5 mg (has no administration in time range)       New Prescriptions    No medications on file        Follow-up Information     Schedule an appointment as soon as possible for a visit  with Franci Arteaga MD.    Specialty: Family Medicine  Contact information:  [address]  [address]  [facility] [address] [phone]             [facility] - Emergency Dept..    Specialty: Emergency Medicine  Why: As needed, If symptoms worsen  Contact information:  [address]  [facility] [address] [phone]                           Scribe Attestation:   Scribe #1: I performed the above scribed service and the documentation accurately describes the services I performed. I attest to the accuracy of the note.     Attending:   Physician Attestation Statement for Scribe #1: I, Oliver Carlton MD, personally performed the services described in this documentation, as scribed by Guillermo Edmond, in my presence, and it is both accurate and complete.           Clinical Impression       ICD-10-CM ICD-9-CM   1. Essential hypertension  I10 401.9   2. Chest pain  R07.9 786.50   3. Fatigue, unspecified type  R53.83 780.79   4. Lab test negative for COVID-19 virus  Z20.822 V01.79       Disposition:   Disposition: Discharged  Condition: Stable         Oliver Carlton MD  06/14/22 7106

## 2022-06-14 NOTE — TELEPHONE ENCOUNTER
Placed lewis to patient schedule appt for Friday with Ramez advised in the mean time to monitor blood pressure and report to ED if extremely elevated patient voiced understanding  ----- Message from Janiya Salmeron sent at 6/14/2022 12:23 PM CDT -----  Contact: Pt [phone]  Patient is requesting a sooner appointment for an ER follow up/High Blood pressure/medication change. Next appt was not until September    Please call and advise.    Thank You

## 2022-06-14 NOTE — Clinical Note
"Dalia Murphy" uSlma was seen and treated in our emergency department on 6/14/2022.  She may return with no restrictions on 06/15/2022.       Sincerely,      Oliver Carlton MD    "

## 2022-06-14 NOTE — Clinical Note
"Dalia Murphy" Sulma was seen and treated in our emergency department on 6/14/2022.  She may return with no restrictions on 06/15/2022.       Sincerely,      Oliver Carlton MD    "

## 2022-06-14 NOTE — Clinical Note
"Dalia Murphy" Sulma was seen and treated in our emergency department on 6/14/2022.  She may return to work on 06/16/2022.       If you have any questions or concerns, please don't hesitate to call.      Prosper HORN"

## 2022-06-14 NOTE — TELEPHONE ENCOUNTER
OOC Rn Patient calling about /103,  Needs Dr. malloy for b/p.  Feeling fatigue,  Took her meds about 5:30  And b/p not going down,  Intermittent Chest pain 4/10.  Care Advise is to go the ED. Now.  For any new or worsening symptoms to call back OOC RN Offered number. Patient vU.      Reason for Disposition   Systolic BP >= 160 OR Diastolic >= 100, and any cardiac or neurologic symptoms (e.g., chest pain, difficulty breathing, unsteady gait, blurred vision)    Additional Information   Negative: Sounds like a life-threatening emergency to the triager   Negative: Pregnant 20 or more weeks or postpartum (< 6 weeks after delivery) with new hand or face swelling   Negative: Pregnant 20 or more weeks or postpartum with Systolic BP >= 140 OR Diastolic >= 90    Protocols used: BLOOD PRESSURE - HIGH-A-OH

## 2022-06-17 ENCOUNTER — OFFICE VISIT (OUTPATIENT)
Dept: PRIMARY CARE CLINIC | Facility: CLINIC | Age: 47
End: 2022-06-17
Payer: MEDICAID

## 2022-06-17 VITALS
DIASTOLIC BLOOD PRESSURE: 88 MMHG | BODY MASS INDEX: 35.46 KG/M2 | HEART RATE: 103 BPM | OXYGEN SATURATION: 98 % | HEIGHT: 60 IN | SYSTOLIC BLOOD PRESSURE: 142 MMHG | TEMPERATURE: 98 F | WEIGHT: 180.63 LBS

## 2022-06-17 DIAGNOSIS — G89.29 CHRONIC RIGHT SHOULDER PAIN: ICD-10-CM

## 2022-06-17 DIAGNOSIS — G62.9 NEUROPATHY: ICD-10-CM

## 2022-06-17 DIAGNOSIS — F17.210 CIGARETTE NICOTINE DEPENDENCE WITHOUT COMPLICATION: ICD-10-CM

## 2022-06-17 DIAGNOSIS — M25.511 CHRONIC RIGHT SHOULDER PAIN: ICD-10-CM

## 2022-06-17 DIAGNOSIS — Z12.31 BREAST CANCER SCREENING BY MAMMOGRAM: Primary | ICD-10-CM

## 2022-06-17 DIAGNOSIS — M62.838 MUSCLE SPASM: ICD-10-CM

## 2022-06-17 DIAGNOSIS — I10 ESSENTIAL HYPERTENSION: Primary | ICD-10-CM

## 2022-06-17 PROCEDURE — 1160F RVW MEDS BY RX/DR IN RCRD: CPT | Mod: CPTII,,, | Performed by: NURSE PRACTITIONER

## 2022-06-17 PROCEDURE — 3077F SYST BP >= 140 MM HG: CPT | Mod: CPTII,,, | Performed by: NURSE PRACTITIONER

## 2022-06-17 PROCEDURE — 1159F MED LIST DOCD IN RCRD: CPT | Mod: CPTII,,, | Performed by: NURSE PRACTITIONER

## 2022-06-17 PROCEDURE — 3077F PR MOST RECENT SYSTOLIC BLOOD PRESSURE >= 140 MM HG: ICD-10-PCS | Mod: CPTII,,, | Performed by: NURSE PRACTITIONER

## 2022-06-17 PROCEDURE — 99999 PR PBB SHADOW E&M-EST. PATIENT-LVL V: CPT | Mod: PBBFAC,,, | Performed by: NURSE PRACTITIONER

## 2022-06-17 PROCEDURE — 99999 PR PBB SHADOW E&M-EST. PATIENT-LVL V: ICD-10-PCS | Mod: PBBFAC,,, | Performed by: NURSE PRACTITIONER

## 2022-06-17 PROCEDURE — 3079F PR MOST RECENT DIASTOLIC BLOOD PRESSURE 80-89 MM HG: ICD-10-PCS | Mod: CPTII,,, | Performed by: NURSE PRACTITIONER

## 2022-06-17 PROCEDURE — 3008F PR BODY MASS INDEX (BMI) DOCUMENTED: ICD-10-PCS | Mod: CPTII,,, | Performed by: NURSE PRACTITIONER

## 2022-06-17 PROCEDURE — 1159F PR MEDICATION LIST DOCUMENTED IN MEDICAL RECORD: ICD-10-PCS | Mod: CPTII,,, | Performed by: NURSE PRACTITIONER

## 2022-06-17 PROCEDURE — 3008F BODY MASS INDEX DOCD: CPT | Mod: CPTII,,, | Performed by: NURSE PRACTITIONER

## 2022-06-17 PROCEDURE — 99215 OFFICE O/P EST HI 40 MIN: CPT | Mod: PBBFAC,PN | Performed by: NURSE PRACTITIONER

## 2022-06-17 PROCEDURE — 1160F PR REVIEW ALL MEDS BY PRESCRIBER/CLIN PHARMACIST DOCUMENTED: ICD-10-PCS | Mod: CPTII,,, | Performed by: NURSE PRACTITIONER

## 2022-06-17 PROCEDURE — 3079F DIAST BP 80-89 MM HG: CPT | Mod: CPTII,,, | Performed by: NURSE PRACTITIONER

## 2022-06-17 PROCEDURE — 99213 OFFICE O/P EST LOW 20 MIN: CPT | Mod: S$PBB,,, | Performed by: NURSE PRACTITIONER

## 2022-06-17 PROCEDURE — 99213 PR OFFICE/OUTPT VISIT, EST, LEVL III, 20-29 MIN: ICD-10-PCS | Mod: S$PBB,,, | Performed by: NURSE PRACTITIONER

## 2022-06-17 RX ORDER — GABAPENTIN 300 MG/1
300 CAPSULE ORAL NIGHTLY
Qty: 30 CAPSULE | Refills: 2 | Status: SHIPPED | OUTPATIENT
Start: 2022-06-17 | End: 2022-08-24 | Stop reason: ALTCHOICE

## 2022-06-17 RX ORDER — IBUPROFEN 800 MG/1
800 TABLET ORAL EVERY 8 HOURS PRN
Qty: 30 TABLET | Refills: 0 | Status: SHIPPED | OUTPATIENT
Start: 2022-06-17 | End: 2023-10-20 | Stop reason: SDUPTHER

## 2022-06-17 RX ORDER — AMLODIPINE BESYLATE 10 MG/1
10 TABLET ORAL DAILY
Qty: 30 TABLET | Refills: 0 | Status: SHIPPED | OUTPATIENT
Start: 2022-06-17 | End: 2022-07-17

## 2022-06-17 RX ORDER — TIZANIDINE 4 MG/1
4 TABLET ORAL EVERY 8 HOURS PRN
Qty: 90 TABLET | Refills: 0 | Status: SHIPPED | OUTPATIENT
Start: 2022-06-17 | End: 2022-08-24 | Stop reason: SDUPTHER

## 2022-06-17 NOTE — LETTER
[address] [facility] [address]          Return to Work/School    Patient: Dalia Ozuna  YOB: 1975   Date: 06/17/2022     To Whom It May Concern:     Dalia Ozuna was seen in my office on 06/17/2022 .  She may return back to work on 06/18/2022. Patient can return to regular duties with no restrictions.      If you have any questions or concerns, or if I can be of further assistance, please do not hesitate to contact me.     Sincerely,    Evangelina Myrick, NP

## 2022-06-17 NOTE — PATIENT INSTRUCTIONS
Take 10 mg Amlodipine daily.  Record your pulse rate when your check your blood pressure.  Follow up with your smoking cessation referral.    ER for BP >180/110 or for any worsening headache that doesn't improve with medication, sudden onset of vision changes, slurred speech, facial drooping, or for any weakness, numbness, or tingling of face or extremities.  Keep a log of your blood pressures and follow up with your primary care doctor. If numbers remain elevated, they may want to see you in clinic to discuss changing/adding you your current medication regimen.   Take your BP on the same arm each time and around the same time each day. Sit with both feet on the floor for a full 5 minutes. Your arm should be at the level of your heart when you take your pressure.   Watch the sodium in your diet. Try to stay under 2000 mg per day. Canned foods, pre-packaged/processed foods, and food from restaurants are all usually high in sodium.  Limit caffeine intake to one cup per day if possible.   Avoid over the counter decongestants (decongestants have one of these listed in the active ingredients: phenylephrine, pseudoephedrine).   Try to take 15-20 minutes every evening to do something relaxing (read a book, bath, listen to soothing music, etc). This will help with stress and BP.

## 2022-06-17 NOTE — LETTER
June 17, 2022    Dalia Ozuna  [address]  [place] [address]             [facility]  Primary Care  [address] [place] [address]  YOSI PARMAR [address]   June 17, 2022     Patient: Dalia Ozuna   YOB: 1975   Date of Visit: 6/17/2022       To Whom it May Concern:    Dalia Ozuna was seen in my clinic on 6/17/2022 with no restrictions. Please excuse her from any classes or work missed.    If you have any questions or concerns, please don't hesitate to call.    Sincerely,         Evangelina Myrick, NP

## 2022-06-17 NOTE — PROGRESS NOTES
Subjective:      Patient ID: Dalia Ozuna is a 46 y.o. female.    Chief Complaint: Follow-up (Patient hospital f/u and medication refills)    BP (!) 152/98 (BP Location: Left arm, Patient Position: Sitting, BP Method: Medium (Manual))   Pulse (!) 123   Temp 98.1 °F (36.7 °C) (Temporal)   Ht 5' (1.524 m)   Wt 81.9 kg (180 lb 9.6 oz)   SpO2 98%   BMI 35.27 kg/m²     Pt presents with c/o elevated blood pressure and episodes of elevated pulse rate. Has been getting intermittent headaches and begins to worry when her pulse rate is [place]. Says she was sent to the ER by her job a few days ago due to her BP being elevated. Admits she takes her 5 mg Amlodipine daily and it use to work but doesn't seem to anymore. Has a diet high in caffeine and sodium and smokes two packs of cigarettes a day. Says she wasn't ready for smoking cessation before but she is now.    Follow-up  Pertinent negatives include no chest pain, chills, congestion, coughing, fever, headaches, nausea, sore throat or vomiting.       Review of patient's allergies indicates:   Allergen Reactions    Penicillins Hives    Aspirin Other (See Comments)     Stomach Pain ( Sharp )         Review of Systems   Constitutional: Negative for chills and fever.   HENT: Negative for congestion and sore throat.    Respiratory: Negative for cough and shortness of breath.    Cardiovascular: Negative for chest pain and palpitations.   Gastrointestinal: Negative for nausea and vomiting.   Genitourinary: Negative.    Skin: Negative.    Neurological: Negative for headaches.      Objective:      Physical Exam  Vitals reviewed.   Constitutional:       Appearance: She is well-developed.   HENT:      Head: Normocephalic and atraumatic.      Right Ear: External ear normal.      Left Ear: External ear normal.      Nose: No mucosal edema or rhinorrhea.      Mouth/Throat:      Mouth: Mucous membranes are moist.      Pharynx: Uvula midline.   Eyes:      General: Lids are  normal. Gaze aligned appropriately.      Conjunctiva/sclera: Conjunctivae normal.      Pupils: Pupils are equal, round, and reactive to light.   Cardiovascular:      Rate and Rhythm: Normal rate and regular rhythm.      Heart sounds: Normal heart sounds. No murmur heard.  Pulmonary:      Effort: Pulmonary effort is normal.      Breath sounds: Normal breath sounds. No decreased breath sounds or wheezing.   Musculoskeletal:         General: Normal range of motion.      Cervical back: Normal range of motion and neck supple.   Lymphadenopathy:      Cervical: No cervical adenopathy.   Skin:     General: Skin is warm and dry.      Capillary Refill: Capillary refill takes less than 2 seconds.      Coloration: Skin is not cyanotic or pale.   Neurological:      Mental Status: She is alert and oriented to person, place, and time.      Cranial Nerves: No cranial nerve deficit.   Psychiatric:         Attention and Perception: Attention normal.         Mood and Affect: Mood normal.         Speech: Speech normal.         Behavior: Behavior normal.         Thought Content: Thought content normal.         Cognition and Memory: Cognition normal.         Assessment:       1. Essential hypertension    2. Cigarette nicotine dependence without complication    3. Chronic right shoulder pain    4. Neuropathy    5. Muscle spasm        Plan:     Essential hypertension  -     amLODIPine (NORVASC) 10 MG tablet; Take 1 tablet (10 mg total) by mouth once daily.  Dispense: 30 tablet; Refill: 0    Cigarette nicotine dependence without complication  -     Ambulatory referral/consult to Smoking Cessation Program; Future; Expected date: 06/24/2022    Chronic right shoulder pain  -     ibuprofen (ADVIL,MOTRIN) 800 MG tablet; Take 1 tablet (800 mg total) by mouth every 8 (eight) hours as needed for Pain.  Dispense: 30 tablet; Refill: 0    Neuropathy  -     gabapentin (NEURONTIN) 300 MG capsule; Take 1 capsule (300 mg total) by mouth every evening.   Dispense: 30 capsule; Refill: 2    Muscle spasm  -     tiZANidine (ZANAFLEX) 4 MG tablet; Take 1 tablet (4 mg total) by mouth every 8 (eight) hours as needed (shoulder pain).  Dispense: 90 tablet; Refill: 0    Follow up in two weeks for nurse visit for BP check.    Take 10 mg Amlodipine daily.  Record your pulse rate when your check your blood pressure.  Follow up with your smoking cessation referral.    · ER for BP >180/110 or for any worsening headache that doesn't improve with medication, sudden onset of vision changes, slurred speech, facial drooping, or for any weakness, numbness, or tingling of face or extremities.  · Keep a log of your blood pressures and follow up with your primary care doctor. If numbers remain elevated, they may want to see you in clinic to discuss changing/adding you your current medication regimen.   · Take your BP on the same arm each time and around the same time each day. Sit with both feet on the floor for a full 5 minutes. Your arm should be at the level of your heart when you take your pressure.   · Watch the sodium in your diet. Try to stay under 2000 mg per day. Canned foods, pre-packaged/processed foods, and food from restaurants are all usually high in sodium.  · Limit caffeine intake to one cup per day if possible.   · Avoid over the counter decongestants (decongestants have one of these listed in the active ingredients: phenylephrine, pseudoephedrine).   · Try to take 15-20 minutes every evening to do something relaxing (read a book, bath, listen to soothing music, etc). This will help with stress and BP.

## 2022-07-13 ENCOUNTER — PATIENT MESSAGE (OUTPATIENT)
Dept: SMOKING CESSATION | Facility: CLINIC | Age: 47
End: 2022-07-13
Payer: MEDICAID

## 2022-07-14 ENCOUNTER — PATIENT MESSAGE (OUTPATIENT)
Dept: SMOKING CESSATION | Facility: CLINIC | Age: 47
End: 2022-07-14

## 2022-07-14 ENCOUNTER — TELEPHONE (OUTPATIENT)
Dept: SMOKING CESSATION | Facility: CLINIC | Age: 47
End: 2022-07-14

## 2022-07-14 NOTE — TELEPHONE ENCOUNTER
Call to the patient's mobile phone regarding 3:00 pm intake appointment for Tobacco Cessation Program . Patient reports she forgot & requests to be rescheduled to 8/4/2022 at 2:00 pm.

## 2022-07-22 DIAGNOSIS — M62.838 MUSCLE SPASM: ICD-10-CM

## 2022-07-22 DIAGNOSIS — M25.511 CHRONIC RIGHT SHOULDER PAIN: ICD-10-CM

## 2022-07-22 DIAGNOSIS — G89.29 CHRONIC RIGHT SHOULDER PAIN: ICD-10-CM

## 2022-07-24 RX ORDER — TIZANIDINE 4 MG/1
4 TABLET ORAL EVERY 8 HOURS PRN
Qty: 90 TABLET | Refills: 0 | OUTPATIENT
Start: 2022-07-24

## 2022-07-24 RX ORDER — AMLODIPINE BESYLATE 5 MG/1
5 TABLET ORAL DAILY
Qty: 30 TABLET | Refills: 0 | Status: SHIPPED | OUTPATIENT
Start: 2022-07-24 | End: 2022-08-16 | Stop reason: SDUPTHER

## 2022-07-24 RX ORDER — IBUPROFEN 800 MG/1
800 TABLET ORAL EVERY 8 HOURS PRN
Qty: 30 TABLET | Refills: 0 | OUTPATIENT
Start: 2022-07-24

## 2022-08-04 ENCOUNTER — TELEPHONE (OUTPATIENT)
Dept: SMOKING CESSATION | Facility: CLINIC | Age: 47
End: 2022-08-04

## 2022-08-04 NOTE — TELEPHONE ENCOUNTER
Call to the patient's mobile phone regarding 2:00 pm intake appointment for Tobacco Cessation Program today. Patient states she's not able to make it & requests a reschedule. Rescheduled for 8/25/2022 at 4:00 pm

## 2022-08-16 ENCOUNTER — TELEPHONE (OUTPATIENT)
Dept: PRIMARY CARE CLINIC | Facility: CLINIC | Age: 47
End: 2022-08-16

## 2022-08-16 ENCOUNTER — CLINICAL SUPPORT (OUTPATIENT)
Dept: PRIMARY CARE CLINIC | Facility: CLINIC | Age: 47
End: 2022-08-16
Payer: MEDICAID

## 2022-08-16 DIAGNOSIS — I10 ESSENTIAL HYPERTENSION: ICD-10-CM

## 2022-08-16 DIAGNOSIS — I10 ESSENTIAL HYPERTENSION: Primary | ICD-10-CM

## 2022-08-16 DIAGNOSIS — Z01.419 WELL WOMAN EXAM: ICD-10-CM

## 2022-08-16 DIAGNOSIS — Z12.11 COLON CANCER SCREENING: ICD-10-CM

## 2022-08-16 DIAGNOSIS — Z12.31 BREAST CANCER SCREENING BY MAMMOGRAM: Primary | ICD-10-CM

## 2022-08-16 RX ORDER — AMLODIPINE BESYLATE 5 MG/1
5 TABLET ORAL DAILY
Qty: 30 TABLET | Refills: 2 | Status: SHIPPED | OUTPATIENT
Start: 2022-08-16 | End: 2022-08-24 | Stop reason: ALTCHOICE

## 2022-08-16 NOTE — TELEPHONE ENCOUNTER
----- Message from Alison Miranda sent at 8/16/2022  9:35 AM CDT -----  Contact: Patient, [phone]  Requesting an RX refill or new RX.  Is this a refill or new RX: Refill  RX name and strength (copy/paste from chart):  amLODIPine (NORVASC) 10 MG tablet  Is this a 30 day or 90 day RX: 30  Pharmacy name and phone # (copy/paste from chart):    [id number] - [place] - [address] AT [address] [address]  Phone: [phone] Fax: [phone]  The doctors have asked that we provide their patients with the following 2 reminders -- prescription refills can take up to 72 hours, and a friendly reminder that in the future you can use your [facility] account to request refills: Yes      Requesting an RX refill or new RX.  Is this a refill or new RX: Refill  RX name and strength (copy/paste from chart):  tiZANidine (ZANAFLEX) 4 MG tablet  Is this a 30 day or 90 day RX: 30  Pharmacy name and phone # (copy/paste from chart):    [id number] - [place], [place] - [address] [address]  Phone: [phone] Fax: [phone]  The doctors have asked that we provide their patients with the following 2 reminders -- prescription refills can take up to 72 hours, and a friendly reminder that in the future you can use your [facility] account to request refills: Yes       Requesting an RX refill or new RX.  Is this a refill or new RX: Refill  RX name and strength (copy/paste from chart):  ibuprofen (ADVIL,MOTRIN) 800 MG tablet  Is this a 30 day or 90 day RX: 30  Pharmacy name and phone # (copy/paste from chart):    [id number] - [place], [place] - [address] AT [address] OF [address] [address]  Phone: [phone] Fax: [phone]  The doctors have asked that we provide their patients with the following 2 reminders -- prescription refills can take up to 72 hours, and a friendly reminder that in the future  you can use your [facility] account to request refills: Yes

## 2022-08-16 NOTE — TELEPHONE ENCOUNTER
I called the patient to inform her that her medication request requires an office visit, patient verbalized her understanding and states that she has a virtual visit to have medication filled .

## 2022-08-24 ENCOUNTER — OFFICE VISIT (OUTPATIENT)
Dept: PRIMARY CARE CLINIC | Facility: CLINIC | Age: 47
End: 2022-08-24
Payer: MEDICAID

## 2022-08-24 VITALS — HEART RATE: 86 BPM | DIASTOLIC BLOOD PRESSURE: 72 MMHG | SYSTOLIC BLOOD PRESSURE: 118 MMHG

## 2022-08-24 DIAGNOSIS — M25.511 CHRONIC RIGHT SHOULDER PAIN: ICD-10-CM

## 2022-08-24 DIAGNOSIS — F41.9 ANXIETY AND DEPRESSION: ICD-10-CM

## 2022-08-24 DIAGNOSIS — M62.838 MUSCLE SPASM: ICD-10-CM

## 2022-08-24 DIAGNOSIS — I10 ESSENTIAL HYPERTENSION: Primary | ICD-10-CM

## 2022-08-24 DIAGNOSIS — F32.A ANXIETY AND DEPRESSION: ICD-10-CM

## 2022-08-24 DIAGNOSIS — G89.29 CHRONIC RIGHT SHOULDER PAIN: ICD-10-CM

## 2022-08-24 PROCEDURE — 3078F DIAST BP <80 MM HG: CPT | Mod: CPTII,95,, | Performed by: FAMILY MEDICINE

## 2022-08-24 PROCEDURE — 1160F RVW MEDS BY RX/DR IN RCRD: CPT | Mod: CPTII,95,, | Performed by: FAMILY MEDICINE

## 2022-08-24 PROCEDURE — 99214 PR OFFICE/OUTPT VISIT, EST, LEVL IV, 30-39 MIN: ICD-10-PCS | Mod: 95,,, | Performed by: FAMILY MEDICINE

## 2022-08-24 PROCEDURE — 1159F PR MEDICATION LIST DOCUMENTED IN MEDICAL RECORD: ICD-10-PCS | Mod: CPTII,95,, | Performed by: FAMILY MEDICINE

## 2022-08-24 PROCEDURE — 99214 OFFICE O/P EST MOD 30 MIN: CPT | Mod: 95,,, | Performed by: FAMILY MEDICINE

## 2022-08-24 PROCEDURE — 1160F PR REVIEW ALL MEDS BY PRESCRIBER/CLIN PHARMACIST DOCUMENTED: ICD-10-PCS | Mod: CPTII,95,, | Performed by: FAMILY MEDICINE

## 2022-08-24 PROCEDURE — 1159F MED LIST DOCD IN RCRD: CPT | Mod: CPTII,95,, | Performed by: FAMILY MEDICINE

## 2022-08-24 PROCEDURE — 3074F SYST BP LT 130 MM HG: CPT | Mod: CPTII,95,, | Performed by: FAMILY MEDICINE

## 2022-08-24 PROCEDURE — 3074F PR MOST RECENT SYSTOLIC BLOOD PRESSURE < 130 MM HG: ICD-10-PCS | Mod: CPTII,95,, | Performed by: FAMILY MEDICINE

## 2022-08-24 PROCEDURE — 3078F PR MOST RECENT DIASTOLIC BLOOD PRESSURE < 80 MM HG: ICD-10-PCS | Mod: CPTII,95,, | Performed by: FAMILY MEDICINE

## 2022-08-24 RX ORDER — IBUPROFEN 800 MG/1
800 TABLET ORAL EVERY 8 HOURS PRN
Qty: 60 TABLET | Refills: 2 | Status: SHIPPED | OUTPATIENT
Start: 2022-08-24 | End: 2022-09-23

## 2022-08-24 RX ORDER — SERTRALINE HYDROCHLORIDE 25 MG/1
25 TABLET, FILM COATED ORAL DAILY
Qty: 30 TABLET | Refills: 2 | Status: SHIPPED | OUTPATIENT
Start: 2022-08-24 | End: 2023-03-13

## 2022-08-24 RX ORDER — AMLODIPINE BESYLATE 10 MG/1
10 TABLET ORAL DAILY
Qty: 30 TABLET | Refills: 2 | Status: SHIPPED | OUTPATIENT
Start: 2022-08-24 | End: 2023-03-13

## 2022-08-24 RX ORDER — TIZANIDINE 4 MG/1
4 TABLET ORAL NIGHTLY PRN
Qty: 90 TABLET | Refills: 0 | Status: SHIPPED | OUTPATIENT
Start: 2022-08-24 | End: 2022-11-22

## 2022-08-24 NOTE — PROGRESS NOTES
Subjective:    The patient location is: [place] at work  Visit type: Virtual visit with synchronous audio and video  Total time spent with patient:30 mins  Each patient to whom he or she provides medical services by telemedicine is:  (1) informed of the relationship between the physician and patient and the respective role of any other health care provider with respect to management of the patient; and (2) notified that he or she may decline to receive medical services by telemedicine and may withdraw from such care at any time.       Patient ID: Dalia Ozuna is a 46 y.o. female.    Chief Complaint: Right shoulder pain, HTN and anxiety and depression      HPI   History of Present Illness:   Dalia Ozuna 46 y.o. female presents today with   Per pt, she used to be on gabapentin for depression but stopped taking on her own due to side effect-groggy.   Right Shoulder pain s/p Rotator cuff repair in the remote past for which she takes Ibuprofen 2 times a day as needed and tizanidine at night as needed if she need extra coverage at night time.  HTN is now controlled.    Past Medical History:   Diagnosis Date    Essential hypertension     Pneumonia      No family history on file.  Social History     Socioeconomic History    Marital status:    Tobacco Use    Smoking status: Current Every Day Smoker     Packs/day: 2.00     Years: 26.00     Pack years: 52.00     Types: Cigarettes     Start date: 5/1/1998    Smokeless tobacco: Never Used    Tobacco comment: currently taking chantix    Substance and Sexual Activity    Alcohol use: Not Currently    Drug use: Never    Sexual activity: Yes     Partners: Male     Social Determinants of Health     Financial Resource Strain: Low Risk     Difficulty of Paying Living Expenses: Not very hard   Food Insecurity: No Food Insecurity    Worried About Running Out of Food in the Last Year: Never true    Ran Out of Food in the Last Year: Never true    Transportation Needs: No Transportation Needs    Lack of Transportation (Medical): No    Lack of Transportation (Non-Medical): No   Physical Activity: Insufficiently Active    Days of Exercise per Week: 1 day    Minutes of Exercise per Session: 60 min   Stress: Stress Concern Present    Feeling of Stress : Very much   Social Connections: Moderately Isolated    Frequency of Communication with Friends and Family: More than three times a week    Frequency of Social Gatherings with Friends and Family: More than three times a week    Attends Temple Services: More than 4 times per year    Active Member of Clubs or Organizations: No    Attends Club or Organization Meetings: Never    Marital Status:    Housing Stability: Low Risk     Unable to Pay for Housing in the Last Year: No    Number of Places Lived in the Last Year: 1    Unstable Housing in the Last Year: No     Outpatient Encounter Medications as of 8/24/2022   Medication Sig Dispense Refill    albuterol (PROVENTIL/VENTOLIN HFA) 90 mcg/actuation inhaler Inhale 2 puffs into the lungs every 4 (four) hours as needed.      albuterol (VENTOLIN HFA) 90 mcg/actuation inhaler Inhale 2 puffs into the lungs every 6 (six) hours as needed for Wheezing. Rescue 18 g 0    amLODIPine (NORVASC) 10 MG tablet Take 1 tablet (10 mg total) by mouth once daily. 30 tablet 2    buPROPion (WELLBUTRIN XL) 150 MG TB24 tablet Take 1 tablet (150 mg total) by mouth once daily for 7 days, THEN 1 tablet (150 mg total) 2 (two) times daily for 21 days. 49 tablet 3    busPIRone (BUSPAR) 7.5 MG tablet Take 1 tablet (7.5 mg total) by mouth 3 (three) times daily. (Patient not taking: Reported on 6/17/2022) 90 tablet 1    gabapentin (NEURONTIN) 300 MG capsule Take 1 capsule (300 mg total) by mouth every evening. 30 capsule 2    ibuprofen (ADVIL,MOTRIN) 800 MG tablet Take 1 tablet (800 mg total) by mouth every 8 (eight) hours as needed for Pain. 30 tablet 0    ibuprofen  (ADVIL,MOTRIN) 800 MG tablet Take 1 tablet (800 mg total) by mouth every 8 (eight) hours as needed for Pain. 60 tablet 2    pantoprazole (PROTONIX) 20 MG tablet Take 20 mg by mouth.      sertraline (ZOLOFT) 25 MG tablet Take 1 tablet (25 mg total) by mouth once daily. 30 tablet 2    tiZANidine (ZANAFLEX) 4 MG tablet Take 1 tablet (4 mg total) by mouth nightly as needed (shoulder pain). 90 tablet 0    [DISCONTINUED] amLODIPine (NORVASC) 5 MG tablet Take 1 tablet (5 mg total) by mouth once daily. 30 tablet 2    [DISCONTINUED] tiZANidine (ZANAFLEX) 4 MG tablet Take 1 tablet (4 mg total) by mouth every 8 (eight) hours as needed (shoulder pain). 90 tablet 0     No facility-administered encounter medications on file as of 8/24/2022.       Review of Systems    Review of Systems      A complete 10 point ROS was completed and are positive as per above HPI.    Otherwise negative for fever, diplopia, chest pain, shortness of breath, vomiting, blood in urine, joint pain, skin rash, seizures and unusual bleeding.     Objective:      /72   Pulse 86   Physical Exam    CONSTITUTIONAL: No apparent distress. Appears comfortable. Does not appear acutely ill or septic. Appears adequately hydrated.  CARDIOVASCULAR: No perioral cyanosis  PULMONARY: Breathing unlabored. No retractions Chest expansion grossly normal.  PSYCHIATRIC: Alert and conversant and grossly oriented. Mood is grossly neutral. Affect appropriate. Judgment and insight grossly intact.  NEUROLOGIC: No focal sensory deficits reported.   Results for orders placed or performed during the hospital encounter of 06/14/22   CBC auto differential   Result Value Ref Range    WBC 7.04 3.90 - 12.70 K/uL    RBC 4.20 4.00 - 5.40 M/uL    Hemoglobin 12.2 12.0 - 16.0 g/dL    Hematocrit 38.3 37.0 - 48.5 %    MCV 91 82 - 98 fL    MCH 29.0 27.0 - 31.0 pg    MCHC 31.9 (L) 32.0 - 36.0 g/dL    RDW 14.4 11.5 - 14.5 %    Platelets 285 150 - 450 K/uL    MPV 10.1 9.2 - 12.9 fL     Immature Granulocytes 0.1 0.0 - 0.5 %    Gran # (ANC) 3.8 1.8 - 7.7 K/uL    Immature Grans (Abs) 0.01 0.00 - 0.04 K/uL    Lymph # 2.7 1.0 - 4.8 K/uL    Mono # 0.5 0.3 - 1.0 K/uL    Eos # 0.1 0.0 - 0.5 K/uL    Baso # 0.02 0.00 - 0.20 K/uL    nRBC 0 0 /100 WBC    Gran % 53.3 38.0 - 73.0 %    Lymph % 37.8 18.0 - 48.0 %    Mono % 6.7 4.0 - 15.0 %    Eosinophil % 1.8 0.0 - 8.0 %    Basophil % 0.3 0.0 - 1.9 %    Differential Method Automated    Comprehensive metabolic panel   Result Value Ref Range    Sodium 140 136 - 145 mmol/L    Potassium 3.8 3.5 - 5.1 mmol/L    Chloride 111 (H) 95 - 110 mmol/L    CO2 21 (L) 23 - 29 mmol/L    Glucose 94 70 - 110 mg/dL    BUN 8 6 - 20 mg/dL    Creatinine 0.7 0.5 - 1.4 mg/dL    Calcium 8.8 8.7 - 10.5 mg/dL    Total Protein 6.9 6.0 - 8.4 g/dL    Albumin 3.8 3.5 - 5.2 g/dL    Total Bilirubin 0.2 0.1 - 1.0 mg/dL    Alkaline Phosphatase 49 (L) 55 - 135 U/L    AST 12 10 - 40 U/L    ALT 7 (L) 10 - 44 U/L    Anion Gap 8 8 - 16 mmol/L    eGFR if African American >60 >60 mL/min/1.73 m^2    eGFR if non African American >60 >60 mL/min/1.73 m^2   Troponin I   Result Value Ref Range    Troponin I <0.006 0.000 - 0.026 ng/mL   POCT COVID-19 Rapid Screening   Result Value Ref Range    POC Rapid COVID Negative Negative     Acceptable Yes      Assessment:       1. Essential hypertension    2. Chronic right shoulder pain    3. Muscle spasm    4. Anxiety and depression        Plan:   Essential hypertension  -     amLODIPine (NORVASC) 10 MG tablet; Take 1 tablet (10 mg total) by mouth once daily.  Dispense: 30 tablet; Refill: 2    Chronic right shoulder pain  -     ibuprofen (ADVIL,MOTRIN) 800 MG tablet; Take 1 tablet (800 mg total) by mouth every 8 (eight) hours as needed for Pain.  Dispense: 60 tablet; Refill: 2    Muscle spasm  -     tiZANidine (ZANAFLEX) 4 MG tablet; Take 1 tablet (4 mg total) by mouth nightly as needed (shoulder pain).  Dispense: 90 tablet; Refill: 0    Anxiety and  depression  -     sertraline (ZOLOFT) 25 MG tablet; Take 1 tablet (25 mg total) by mouth once daily.  Dispense: 30 tablet; Refill: 2    Initiated Zoloft with plan to titrate up on her next visit.  Treatment options and alternatives were discussed with the patient. Patient was given ample time to ask questions. All questions were answered. Voices understanding and acceptance of this advice. Will call back if any further questions or concerns.  Franci Arteaga MD

## 2022-08-25 ENCOUNTER — TELEPHONE (OUTPATIENT)
Dept: SMOKING CESSATION | Facility: CLINIC | Age: 47
End: 2022-08-25

## 2022-08-25 NOTE — TELEPHONE ENCOUNTER
Call to the patient's mobile phone regarding 4:00 pm intake appointment for Tobacco Cessation Program today. Patient reports she forgot & requests to be rescheduled for 9/8/2022 at 3:00 pm stating she can come after she picks her daughter up from school that day.

## 2022-09-08 ENCOUNTER — TELEPHONE (OUTPATIENT)
Dept: SMOKING CESSATION | Facility: CLINIC | Age: 47
End: 2022-09-08

## 2022-09-08 ENCOUNTER — PATIENT MESSAGE (OUTPATIENT)
Dept: SMOKING CESSATION | Facility: CLINIC | Age: 47
End: 2022-09-08

## 2022-09-08 NOTE — TELEPHONE ENCOUNTER
Call to the patient's mobile phone regarding 3:00 pm intake appointment for Tobacco Cessation Program. Inability to leave voicemail. Text & mychart sent

## 2022-09-28 ENCOUNTER — TELEPHONE (OUTPATIENT)
Dept: SMOKING CESSATION | Facility: CLINIC | Age: 47
End: 2022-09-28
Payer: MEDICAID

## 2022-09-28 ENCOUNTER — PATIENT MESSAGE (OUTPATIENT)
Dept: SMOKING CESSATION | Facility: CLINIC | Age: 47
End: 2022-09-28
Payer: MEDICAID

## 2022-09-28 NOTE — TELEPHONE ENCOUNTER
Phone call to patient with patient hanging up on CTTS. 2nd attempt patient didn't answer & unable to leave . Mychart & text sent

## 2023-03-13 ENCOUNTER — PATIENT MESSAGE (OUTPATIENT)
Dept: PRIMARY CARE CLINIC | Facility: CLINIC | Age: 48
End: 2023-03-13
Payer: COMMERCIAL

## 2023-03-13 DIAGNOSIS — Z12.11 COLON CANCER SCREENING: ICD-10-CM

## 2023-03-13 DIAGNOSIS — Z79.899 ENCOUNTER FOR LONG-TERM CURRENT USE OF MEDICATION: ICD-10-CM

## 2023-03-13 DIAGNOSIS — Z12.31 BREAST CANCER SCREENING BY MAMMOGRAM: ICD-10-CM

## 2023-03-13 DIAGNOSIS — Z13.1 SCREENING FOR DIABETES MELLITUS: ICD-10-CM

## 2023-03-13 DIAGNOSIS — Z01.419 WELL WOMAN EXAM: ICD-10-CM

## 2023-03-13 DIAGNOSIS — I10 ESSENTIAL HYPERTENSION: Primary | ICD-10-CM

## 2023-04-27 ENCOUNTER — OFFICE VISIT (OUTPATIENT)
Dept: PRIMARY CARE CLINIC | Facility: CLINIC | Age: 48
End: 2023-04-27
Payer: COMMERCIAL

## 2023-04-27 DIAGNOSIS — I10 ESSENTIAL HYPERTENSION: Primary | ICD-10-CM

## 2023-04-27 DIAGNOSIS — M62.838 MUSCLE SPASM: ICD-10-CM

## 2023-04-27 DIAGNOSIS — K21.9 GASTROESOPHAGEAL REFLUX DISEASE, UNSPECIFIED WHETHER ESOPHAGITIS PRESENT: ICD-10-CM

## 2023-04-27 DIAGNOSIS — R05.9 COUGH, UNSPECIFIED TYPE: ICD-10-CM

## 2023-04-27 PROCEDURE — 99214 PR OFFICE/OUTPT VISIT, EST, LEVL IV, 30-39 MIN: ICD-10-PCS | Mod: 95,,, | Performed by: NURSE PRACTITIONER

## 2023-04-27 PROCEDURE — 99214 OFFICE O/P EST MOD 30 MIN: CPT | Mod: 95,,, | Performed by: NURSE PRACTITIONER

## 2023-04-27 RX ORDER — AMLODIPINE BESYLATE 10 MG/1
10 TABLET ORAL DAILY
Qty: 30 TABLET | Refills: 3 | Status: SHIPPED | OUTPATIENT
Start: 2023-04-27 | End: 2023-11-30

## 2023-04-27 RX ORDER — IBUPROFEN 800 MG/1
800 TABLET ORAL EVERY 8 HOURS PRN
Qty: 60 TABLET | Refills: 0 | Status: SHIPPED | OUTPATIENT
Start: 2023-04-27 | End: 2023-05-07

## 2023-04-27 RX ORDER — ALBUTEROL SULFATE 90 UG/1
2 AEROSOL, METERED RESPIRATORY (INHALATION) EVERY 4 HOURS PRN
Qty: 18 G | Refills: 3 | Status: SHIPPED | OUTPATIENT
Start: 2023-04-27 | End: 2023-11-16 | Stop reason: SDUPTHER

## 2023-04-27 RX ORDER — TIZANIDINE 4 MG/1
4 TABLET ORAL EVERY 8 HOURS
Qty: 90 TABLET | Refills: 1 | Status: SHIPPED | OUTPATIENT
Start: 2023-04-27 | End: 2023-05-27

## 2023-04-27 RX ORDER — BENZONATATE 200 MG/1
200 CAPSULE ORAL 3 TIMES DAILY PRN
Qty: 30 CAPSULE | Refills: 0 | Status: SHIPPED | OUTPATIENT
Start: 2023-04-27 | End: 2023-05-07

## 2023-04-27 NOTE — PROGRESS NOTES
Subjective:       Patient ID: Dalia Ozuna is a 47 y.o. female.    Chief Complaint: cough, muscle spasms  The patient location is:[place], [place]     Visit type: audiovisual-Synchronous      Face to Face time with patient: 11 min  12 minutes of total time spent on the encounter, which includes face to face time and non-face to face time preparing to see the patient (eg, review of tests), Obtaining and/or reviewing separately obtained history, Documenting clinical information in the electronic or other health record, Independently interpreting results (not separately reported) and communicating results to the patient/family/caregiver, or Care coordination (not separately reported).         Each patient to whom he or she provides medical services by telemedicine is:  (1) informed of the relationship between the physician and patient and the respective role of any other health care provider with respect to management of the patient; and (2) notified that he or she may decline to receive medical services by telemedicine and may withdraw from such care at any time.       History of Present Illness:   Dalia Ozuna 47 y.o. female presents today with reports of cough and muscle spasms to back. Patient denies any other problems or concerns at this time. Treatment options and alternatives were discussed with the patient. Patient provided opportunity to ask additional questions.  All questions were answered. Voices understanding and acceptance of this advice. Instructed to call back if any further questions or concerns.      Past Medical History:   Diagnosis Date    Essential hypertension     Pneumonia      No family history on file.  Social History     Socioeconomic History    Marital status:    Tobacco Use    Smoking status: Every Day     Packs/day: 2.00     Years: 26.00     Pack years: 52.00     Types: Cigarettes     Start date: 5/1/1998    Smokeless tobacco: Never    Tobacco comments:     currently taking  chantix    Substance and Sexual Activity    Alcohol use: Not Currently    Drug use: Never    Sexual activity: Yes     Partners: Male     Outpatient Encounter Medications as of 4/27/2023   Medication Sig Dispense Refill    albuterol (PROVENTIL/VENTOLIN HFA) 90 mcg/actuation inhaler Inhale 2 puffs into the lungs every 4 (four) hours as needed for Wheezing. 18 g 3    amLODIPine (NORVASC) 10 MG tablet Take 1 tablet (10 mg total) by mouth once daily. 30 tablet 3    benzonatate (TESSALON) 200 MG capsule Take 1 capsule (200 mg total) by mouth 3 (three) times daily as needed for Cough. 30 capsule 0    ibuprofen (ADVIL,MOTRIN) 800 MG tablet Take 1 tablet (800 mg total) by mouth every 8 (eight) hours as needed for Pain. 30 tablet 0    ibuprofen (ADVIL,MOTRIN) 800 MG tablet Take 1 tablet (800 mg total) by mouth every 8 (eight) hours as needed for Pain. 60 tablet 0    sertraline (ZOLOFT) 25 MG tablet TAKE 1 TABLET BY MOUTH EVERY DAY 30 tablet 0    tiZANidine (ZANAFLEX) 4 MG tablet Take 1 tablet (4 mg total) by mouth every 8 (eight) hours. 90 tablet 1    [DISCONTINUED] albuterol (PROVENTIL/VENTOLIN HFA) 90 mcg/actuation inhaler Inhale 2 puffs into the lungs every 4 (four) hours as needed.      [DISCONTINUED] amLODIPine (NORVASC) 10 MG tablet TAKE 1 TABLET BY MOUTH EVERY DAY 30 tablet 0    [DISCONTINUED] pantoprazole (PROTONIX) 20 MG tablet Take 20 mg by mouth.       No facility-administered encounter medications on file as of 4/27/2023.       Review of Systems   Constitutional:  Negative for chills and fever.   HENT:  Positive for postnasal drip. Negative for ear pain and sore throat.    Respiratory:  Positive for cough, shortness of breath and wheezing.    Musculoskeletal:  Positive for myalgias.   Skin:  Negative for rash.   Allergic/Immunologic: Negative for environmental allergies.   Neurological:  Negative for headaches.     Objective:      There were no vitals taken for this visit.  Physical Exam  Constitutional:        Appearance: Normal appearance. She is normal weight.   Pulmonary:      Effort: Pulmonary effort is normal.   Neurological:      Mental Status: She is alert.   Psychiatric:         Attention and Perception: Attention and perception normal.       Results for orders placed or performed during the hospital encounter of 06/14/22   CBC auto differential   Result Value Ref Range    WBC 7.04 3.90 - 12.70 K/uL    RBC 4.20 4.00 - 5.40 M/uL    Hemoglobin 12.2 12.0 - 16.0 g/dL    Hematocrit 38.3 37.0 - 48.5 %    MCV 91 82 - 98 fL    MCH 29.0 27.0 - 31.0 pg    MCHC 31.9 (L) 32.0 - 36.0 g/dL    RDW 14.4 11.5 - 14.5 %    Platelets 285 150 - 450 K/uL    MPV 10.1 9.2 - 12.9 fL    Immature Granulocytes 0.1 0.0 - 0.5 %    Gran # (ANC) 3.8 1.8 - 7.7 K/uL    Immature Grans (Abs) 0.01 0.00 - 0.04 K/uL    Lymph # 2.7 1.0 - 4.8 K/uL    Mono # 0.5 0.3 - 1.0 K/uL    Eos # 0.1 0.0 - 0.5 K/uL    Baso # 0.02 0.00 - 0.20 K/uL    nRBC 0 0 /100 WBC    Gran % 53.3 38.0 - 73.0 %    Lymph % 37.8 18.0 - 48.0 %    Mono % 6.7 4.0 - 15.0 %    Eosinophil % 1.8 0.0 - 8.0 %    Basophil % 0.3 0.0 - 1.9 %    Differential Method Automated    Comprehensive metabolic panel   Result Value Ref Range    Sodium 140 136 - 145 mmol/L    Potassium 3.8 3.5 - 5.1 mmol/L    Chloride 111 (H) 95 - 110 mmol/L    CO2 21 (L) 23 - 29 mmol/L    Glucose 94 70 - 110 mg/dL    BUN 8 6 - 20 mg/dL    Creatinine 0.7 0.5 - 1.4 mg/dL    Calcium 8.8 8.7 - 10.5 mg/dL    Total Protein 6.9 6.0 - 8.4 g/dL    Albumin 3.8 3.5 - 5.2 g/dL    Total Bilirubin 0.2 0.1 - 1.0 mg/dL    Alkaline Phosphatase 49 (L) 55 - 135 U/L    AST 12 10 - 40 U/L    ALT 7 (L) 10 - 44 U/L    Anion Gap 8 8 - 16 mmol/L    eGFR if African American >60 >60 mL/min/1.73 m^2    eGFR if non African American >60 >60 mL/min/1.73 m^2   Troponin I   Result Value Ref Range    Troponin I <0.006 0.000 - 0.026 ng/mL   POCT COVID-19 Rapid Screening   Result Value Ref Range    POC Rapid COVID Negative Negative      Acceptable Yes      Assessment:       1. Essential hypertension    2. Gastroesophageal reflux disease, unspecified whether esophagitis present    3. Muscle spasm    4. Cough, unspecified type        Plan:   Diagnoses and all orders for this visit:    Essential hypertension  -     amLODIPine (NORVASC) 10 MG tablet; Take 1 tablet (10 mg total) by mouth once daily.    Gastroesophageal reflux disease, unspecified whether esophagitis present    Muscle spasm  -     tiZANidine (ZANAFLEX) 4 MG tablet; Take 1 tablet (4 mg total) by mouth every 8 (eight) hours.    Cough, unspecified type  -     X-Ray Chest PA And Lateral; Future    Other orders  -     albuterol (PROVENTIL/VENTOLIN HFA) 90 mcg/actuation inhaler; Inhale 2 puffs into the lungs every 4 (four) hours as needed for Wheezing.  -     benzonatate (TESSALON) 200 MG capsule; Take 1 capsule (200 mg total) by mouth 3 (three) times daily as needed for Cough.  -     ibuprofen (ADVIL,MOTRIN) 800 MG tablet; Take 1 tablet (800 mg total) by mouth every 8 (eight) hours as needed for Pain.              [facility]   [address] [place] [address] [address]  [place] [address]  Office [phone]  Fax [phone]

## 2023-04-28 ENCOUNTER — PATIENT MESSAGE (OUTPATIENT)
Dept: PRIMARY CARE CLINIC | Facility: CLINIC | Age: 48
End: 2023-04-28
Payer: COMMERCIAL

## 2023-04-28 DIAGNOSIS — K21.9 GASTROESOPHAGEAL REFLUX DISEASE, UNSPECIFIED WHETHER ESOPHAGITIS PRESENT: Primary | ICD-10-CM

## 2023-04-28 RX ORDER — PANTOPRAZOLE SODIUM 20 MG/1
20 TABLET, DELAYED RELEASE ORAL DAILY
Qty: 30 TABLET | Refills: 3 | Status: ON HOLD | OUTPATIENT
Start: 2023-04-28 | End: 2023-07-26 | Stop reason: HOSPADM

## 2023-05-01 ENCOUNTER — HOSPITAL ENCOUNTER (OUTPATIENT)
Dept: RADIOLOGY | Facility: HOSPITAL | Age: 48
Discharge: HOME | End: 2023-05-01
Attending: NURSE PRACTITIONER
Payer: MEDICAID

## 2023-05-01 DIAGNOSIS — R05.9 COUGH, UNSPECIFIED TYPE: ICD-10-CM

## 2023-05-04 ENCOUNTER — HOSPITAL ENCOUNTER (OUTPATIENT)
Dept: RADIOLOGY | Facility: HOSPITAL | Age: 48
Discharge: HOME | End: 2023-05-04
Attending: NURSE PRACTITIONER
Payer: COMMERCIAL

## 2023-05-04 PROCEDURE — 71046 X-RAY EXAM CHEST 2 VIEWS: CPT | Mod: 26,,, | Performed by: RADIOLOGY

## 2023-05-04 PROCEDURE — 71046 XR CHEST PA AND LATERAL: ICD-10-PCS | Mod: 26,,, | Performed by: RADIOLOGY

## 2023-05-04 PROCEDURE — 71046 X-RAY EXAM CHEST 2 VIEWS: CPT | Mod: TC,PN

## 2023-05-23 ENCOUNTER — PATIENT OUTREACH (OUTPATIENT)
Dept: ADMINISTRATIVE | Facility: HOSPITAL | Age: 48
End: 2023-05-23
Payer: COMMERCIAL

## 2023-05-23 NOTE — PROGRESS NOTES
Working  Colonoscopy report: Patient has Cologuard at home and will check on date to see if it is still good and do it.

## 2023-07-18 ENCOUNTER — HOSPITAL ENCOUNTER (OUTPATIENT)
Dept: RADIOLOGY | Facility: HOSPITAL | Age: 48
Discharge: HOME | End: 2023-07-18
Attending: FAMILY MEDICINE
Payer: COMMERCIAL

## 2023-07-18 VITALS — WEIGHT: 180.56 LBS | BODY MASS INDEX: 35.45 KG/M2 | HEIGHT: 60 IN

## 2023-07-18 DIAGNOSIS — Z12.31 BREAST CANCER SCREENING BY MAMMOGRAM: ICD-10-CM

## 2023-07-18 PROCEDURE — 76642 ULTRASOUND BREAST LIMITED: CPT | Mod: TC,LT

## 2023-07-18 PROCEDURE — 77062 BREAST TOMOSYNTHESIS BI: CPT | Mod: TC

## 2023-07-18 PROCEDURE — 77066 DX MAMMO INCL CAD BI: CPT | Mod: 26,,, | Performed by: RADIOLOGY

## 2023-07-18 PROCEDURE — 76642 ULTRASOUND BREAST LIMITED: CPT | Mod: 26,LT,, | Performed by: RADIOLOGY

## 2023-07-18 PROCEDURE — 77062 BREAST TOMOSYNTHESIS BI: CPT | Mod: 26,,, | Performed by: RADIOLOGY

## 2023-07-18 PROCEDURE — 77066 MAMMO DIGITAL DIAGNOSTIC BILAT WITH TOMO: ICD-10-PCS | Mod: 26,,, | Performed by: RADIOLOGY

## 2023-07-18 PROCEDURE — 77062 MAMMO DIGITAL DIAGNOSTIC BILAT WITH TOMO: ICD-10-PCS | Mod: 26,,, | Performed by: RADIOLOGY

## 2023-07-18 PROCEDURE — 76642 US BREAST LEFT LIMITED: ICD-10-PCS | Mod: 26,LT,, | Performed by: RADIOLOGY

## 2023-07-24 ENCOUNTER — HOSPITAL ENCOUNTER (OUTPATIENT)
Facility: HOSPITAL | Age: 48
Discharge: HOME | End: 2023-07-26
Attending: EMERGENCY MEDICINE | Admitting: INTERNAL MEDICINE
Payer: COMMERCIAL

## 2023-07-24 DIAGNOSIS — R07.89 ATYPICAL CHEST PAIN: Primary | ICD-10-CM

## 2023-07-24 DIAGNOSIS — I10 PRIMARY HYPERTENSION: ICD-10-CM

## 2023-07-24 DIAGNOSIS — F17.200 SMOKER: ICD-10-CM

## 2023-07-24 DIAGNOSIS — K21.9 GASTROESOPHAGEAL REFLUX DISEASE, UNSPECIFIED WHETHER ESOPHAGITIS PRESENT: Chronic | ICD-10-CM

## 2023-07-24 DIAGNOSIS — R07.9 CHEST PAIN: ICD-10-CM

## 2023-07-24 DIAGNOSIS — I73.9 CLAUDICATION: ICD-10-CM

## 2023-07-24 PROBLEM — F41.9 ANXIETY: Status: ACTIVE | Noted: 2023-07-24

## 2023-07-24 LAB
ALBUMIN SERPL BCP-MCNC: 3.8 G/DL (ref 3.5–5.2)
ALP SERPL-CCNC: 54 U/L (ref 55–135)
ALT SERPL W/O P-5'-P-CCNC: 12 U/L (ref 10–44)
ANION GAP SERPL CALC-SCNC: 8 MMOL/L (ref 8–16)
AST SERPL-CCNC: 11 U/L (ref 10–40)
B-HCG UR QL: NEGATIVE
BASOPHILS # BLD AUTO: 0.03 K/UL (ref 0–0.2)
BASOPHILS NFR BLD: 0.4 % (ref 0–1.9)
BILIRUB SERPL-MCNC: 0.2 MG/DL (ref 0.1–1)
BNP SERPL-MCNC: 11 PG/ML (ref 0–99)
BUN SERPL-MCNC: 6 MG/DL (ref 6–20)
CALCIUM SERPL-MCNC: 8.7 MG/DL (ref 8.7–10.5)
CHLORIDE SERPL-SCNC: 107 MMOL/L (ref 95–110)
CO2 SERPL-SCNC: 24 MMOL/L (ref 23–29)
CREAT SERPL-MCNC: 0.7 MG/DL (ref 0.5–1.4)
DIFFERENTIAL METHOD: ABNORMAL
EOSINOPHIL # BLD AUTO: 0.1 K/UL (ref 0–0.5)
EOSINOPHIL NFR BLD: 1.7 % (ref 0–8)
ERYTHROCYTE [DISTWIDTH] IN BLOOD BY AUTOMATED COUNT: 13.8 % (ref 11.5–14.5)
EST. GFR  (NO RACE VARIABLE): >60 ML/MIN/1.73 M^2
GLUCOSE SERPL-MCNC: 92 MG/DL (ref 70–110)
HCT VFR BLD AUTO: 37.1 % (ref 37–48.5)
HGB BLD-MCNC: 11.9 G/DL (ref 12–16)
IMM GRANULOCYTES # BLD AUTO: 0.01 K/UL (ref 0–0.04)
IMM GRANULOCYTES NFR BLD AUTO: 0.1 % (ref 0–0.5)
LIPASE SERPL-CCNC: 15 U/L (ref 4–60)
LYMPHOCYTES # BLD AUTO: 2.6 K/UL (ref 1–4.8)
LYMPHOCYTES NFR BLD: 31.9 % (ref 18–48)
MCH RBC QN AUTO: 29.2 PG (ref 27–31)
MCHC RBC AUTO-ENTMCNC: 32.1 G/DL (ref 32–36)
MCV RBC AUTO: 91 FL (ref 82–98)
MONOCYTES # BLD AUTO: 0.5 K/UL (ref 0.3–1)
MONOCYTES NFR BLD: 6.1 % (ref 4–15)
NEUTROPHILS # BLD AUTO: 4.8 K/UL (ref 1.8–7.7)
NEUTROPHILS NFR BLD: 59.8 % (ref 38–73)
NRBC BLD-RTO: 0 /100 WBC
PLATELET # BLD AUTO: 266 K/UL (ref 150–450)
PMV BLD AUTO: 10.5 FL (ref 9.2–12.9)
POTASSIUM SERPL-SCNC: 3.5 MMOL/L (ref 3.5–5.1)
PROT SERPL-MCNC: 6.8 G/DL (ref 6–8.4)
RBC # BLD AUTO: 4.08 M/UL (ref 4–5.4)
SODIUM SERPL-SCNC: 139 MMOL/L (ref 136–145)
TROPONIN I SERPL DL<=0.01 NG/ML-MCNC: <0.006 NG/ML (ref 0–0.03)
TROPONIN I SERPL DL<=0.01 NG/ML-MCNC: <0.006 NG/ML (ref 0–0.03)
WBC # BLD AUTO: 8.02 K/UL (ref 3.9–12.7)

## 2023-07-24 PROCEDURE — 83690 ASSAY OF LIPASE: CPT | Performed by: EMERGENCY MEDICINE

## 2023-07-24 PROCEDURE — 80053 COMPREHEN METABOLIC PANEL: CPT | Performed by: EMERGENCY MEDICINE

## 2023-07-24 PROCEDURE — G0378 HOSPITAL OBSERVATION PER HR: HCPCS

## 2023-07-24 PROCEDURE — 63600175 PHARM REV CODE 636 W HCPCS: Performed by: INTERNAL MEDICINE

## 2023-07-24 PROCEDURE — 93010 ELECTROCARDIOGRAM REPORT: CPT | Mod: ,,, | Performed by: INTERNAL MEDICINE

## 2023-07-24 PROCEDURE — 25000003 PHARM REV CODE 250: Performed by: INTERNAL MEDICINE

## 2023-07-24 PROCEDURE — 85025 COMPLETE CBC W/AUTO DIFF WBC: CPT | Performed by: EMERGENCY MEDICINE

## 2023-07-24 PROCEDURE — 93010 EKG 12-LEAD: ICD-10-PCS | Mod: ,,, | Performed by: INTERNAL MEDICINE

## 2023-07-24 PROCEDURE — 63600175 PHARM REV CODE 636 W HCPCS: Performed by: EMERGENCY MEDICINE

## 2023-07-24 PROCEDURE — 25000003 PHARM REV CODE 250: Performed by: EMERGENCY MEDICINE

## 2023-07-24 PROCEDURE — 96372 THER/PROPH/DIAG INJ SC/IM: CPT | Performed by: INTERNAL MEDICINE

## 2023-07-24 PROCEDURE — 25000242 PHARM REV CODE 250 ALT 637 W/ HCPCS: Performed by: EMERGENCY MEDICINE

## 2023-07-24 PROCEDURE — 99285 EMERGENCY DEPT VISIT HI MDM: CPT | Mod: 25

## 2023-07-24 PROCEDURE — 93005 ELECTROCARDIOGRAM TRACING: CPT

## 2023-07-24 PROCEDURE — 96374 THER/PROPH/DIAG INJ IV PUSH: CPT

## 2023-07-24 PROCEDURE — 83880 ASSAY OF NATRIURETIC PEPTIDE: CPT | Performed by: EMERGENCY MEDICINE

## 2023-07-24 PROCEDURE — 96375 TX/PRO/DX INJ NEW DRUG ADDON: CPT

## 2023-07-24 PROCEDURE — 84484 ASSAY OF TROPONIN QUANT: CPT | Performed by: EMERGENCY MEDICINE

## 2023-07-24 PROCEDURE — 81025 URINE PREGNANCY TEST: CPT | Performed by: EMERGENCY MEDICINE

## 2023-07-24 RX ORDER — PANTOPRAZOLE SODIUM 40 MG/1
40 TABLET, DELAYED RELEASE ORAL DAILY
Status: DISCONTINUED | OUTPATIENT
Start: 2023-07-24 | End: 2023-07-26 | Stop reason: HOSPADM

## 2023-07-24 RX ORDER — DIPHENHYDRAMINE HCL 25 MG
25 CAPSULE ORAL EVERY 6 HOURS PRN
Status: DISCONTINUED | OUTPATIENT
Start: 2023-07-24 | End: 2023-07-26 | Stop reason: HOSPADM

## 2023-07-24 RX ORDER — CLOPIDOGREL BISULFATE 75 MG/1
75 TABLET ORAL ONCE
Status: COMPLETED | OUTPATIENT
Start: 2023-07-24 | End: 2023-07-24

## 2023-07-24 RX ORDER — MAG HYDROX/ALUMINUM HYD/SIMETH 200-200-20
30 SUSPENSION, ORAL (FINAL DOSE FORM) ORAL EVERY 6 HOURS PRN
Status: DISCONTINUED | OUTPATIENT
Start: 2023-07-24 | End: 2023-07-26 | Stop reason: HOSPADM

## 2023-07-24 RX ORDER — ONDANSETRON 2 MG/ML
4 INJECTION INTRAMUSCULAR; INTRAVENOUS EVERY 8 HOURS PRN
Status: DISCONTINUED | OUTPATIENT
Start: 2023-07-24 | End: 2023-07-26 | Stop reason: HOSPADM

## 2023-07-24 RX ORDER — IBUPROFEN 200 MG
1 TABLET ORAL DAILY
Status: DISCONTINUED | OUTPATIENT
Start: 2023-07-25 | End: 2023-07-26 | Stop reason: HOSPADM

## 2023-07-24 RX ORDER — ACETAMINOPHEN 325 MG/1
650 TABLET ORAL EVERY 6 HOURS PRN
Status: DISCONTINUED | OUTPATIENT
Start: 2023-07-24 | End: 2023-07-26 | Stop reason: HOSPADM

## 2023-07-24 RX ORDER — IPRATROPIUM BROMIDE AND ALBUTEROL SULFATE 2.5; .5 MG/3ML; MG/3ML
3 SOLUTION RESPIRATORY (INHALATION) EVERY 4 HOURS PRN
Status: DISCONTINUED | OUTPATIENT
Start: 2023-07-24 | End: 2023-07-26 | Stop reason: HOSPADM

## 2023-07-24 RX ORDER — GUAIFENESIN 100 MG/5ML
200 SOLUTION ORAL EVERY 4 HOURS PRN
Status: DISCONTINUED | OUTPATIENT
Start: 2023-07-24 | End: 2023-07-26 | Stop reason: HOSPADM

## 2023-07-24 RX ORDER — HYDRALAZINE HYDROCHLORIDE 20 MG/ML
10 INJECTION INTRAMUSCULAR; INTRAVENOUS EVERY 8 HOURS PRN
Status: DISCONTINUED | OUTPATIENT
Start: 2023-07-24 | End: 2023-07-26 | Stop reason: HOSPADM

## 2023-07-24 RX ORDER — NITROGLYCERIN 0.4 MG/1
0.4 TABLET SUBLINGUAL EVERY 5 MIN PRN
Status: COMPLETED | OUTPATIENT
Start: 2023-07-24 | End: 2023-07-24

## 2023-07-24 RX ORDER — NITROGLYCERIN 0.4 MG/1
0.4 TABLET SUBLINGUAL
Status: COMPLETED | OUTPATIENT
Start: 2023-07-24 | End: 2023-07-24

## 2023-07-24 RX ORDER — MAG HYDROX/ALUMINUM HYD/SIMETH 200-200-20
30 SUSPENSION, ORAL (FINAL DOSE FORM) ORAL
Status: COMPLETED | OUTPATIENT
Start: 2023-07-24 | End: 2023-07-24

## 2023-07-24 RX ORDER — TIZANIDINE 4 MG/1
4 TABLET ORAL EVERY 8 HOURS
COMMUNITY
Start: 2023-07-10 | End: 2023-09-05

## 2023-07-24 RX ORDER — MORPHINE SULFATE 4 MG/ML
2 INJECTION, SOLUTION INTRAMUSCULAR; INTRAVENOUS EVERY 4 HOURS PRN
Status: DISCONTINUED | OUTPATIENT
Start: 2023-07-24 | End: 2023-07-26 | Stop reason: HOSPADM

## 2023-07-24 RX ORDER — AMLODIPINE BESYLATE 10 MG/1
10 TABLET ORAL DAILY
Status: DISCONTINUED | OUTPATIENT
Start: 2023-07-25 | End: 2023-07-26 | Stop reason: HOSPADM

## 2023-07-24 RX ORDER — ATORVASTATIN CALCIUM 40 MG/1
40 TABLET, FILM COATED ORAL NIGHTLY
Status: DISCONTINUED | OUTPATIENT
Start: 2023-07-24 | End: 2023-07-26 | Stop reason: HOSPADM

## 2023-07-24 RX ORDER — ALPRAZOLAM 0.5 MG/1
0.5 TABLET ORAL 2 TIMES DAILY PRN
Status: DISCONTINUED | OUTPATIENT
Start: 2023-07-24 | End: 2023-07-26 | Stop reason: HOSPADM

## 2023-07-24 RX ORDER — ENOXAPARIN SODIUM 100 MG/ML
40 INJECTION SUBCUTANEOUS EVERY 24 HOURS
Status: DISCONTINUED | OUTPATIENT
Start: 2023-07-24 | End: 2023-07-26 | Stop reason: HOSPADM

## 2023-07-24 RX ORDER — METOPROLOL TARTRATE 25 MG/1
25 TABLET, FILM COATED ORAL 2 TIMES DAILY
Status: DISCONTINUED | OUTPATIENT
Start: 2023-07-24 | End: 2023-07-26 | Stop reason: HOSPADM

## 2023-07-24 RX ORDER — ONDANSETRON 2 MG/ML
4 INJECTION INTRAMUSCULAR; INTRAVENOUS
Status: COMPLETED | OUTPATIENT
Start: 2023-07-24 | End: 2023-07-24

## 2023-07-24 RX ORDER — MORPHINE SULFATE 4 MG/ML
4 INJECTION, SOLUTION INTRAMUSCULAR; INTRAVENOUS EVERY 4 HOURS PRN
Status: DISCONTINUED | OUTPATIENT
Start: 2023-07-24 | End: 2023-07-26 | Stop reason: HOSPADM

## 2023-07-24 RX ADMIN — ENOXAPARIN SODIUM 40 MG: 40 INJECTION SUBCUTANEOUS at 08:07

## 2023-07-24 RX ADMIN — NITROGLYCERIN 0.4 MG: 0.4 TABLET SUBLINGUAL at 04:07

## 2023-07-24 RX ADMIN — METOPROLOL TARTRATE 25 MG: 25 TABLET, FILM COATED ORAL at 10:07

## 2023-07-24 RX ADMIN — NITROGLYCERIN 0.4 MG: 0.4 TABLET SUBLINGUAL at 05:07

## 2023-07-24 RX ADMIN — ONDANSETRON 4 MG: 2 INJECTION INTRAMUSCULAR; INTRAVENOUS at 05:07

## 2023-07-24 RX ADMIN — PANTOPRAZOLE SODIUM 40 MG: 40 TABLET, DELAYED RELEASE ORAL at 08:07

## 2023-07-24 RX ADMIN — CLOPIDOGREL BISULFATE 75 MG: 75 TABLET ORAL at 10:07

## 2023-07-24 RX ADMIN — GUAIFENESIN 200 MG: 200 SOLUTION ORAL at 10:07

## 2023-07-24 RX ADMIN — ATORVASTATIN CALCIUM 40 MG: 40 TABLET, FILM COATED ORAL at 10:07

## 2023-07-24 RX ADMIN — DIPHENHYDRAMINE HYDROCHLORIDE 25 MG: 25 CAPSULE ORAL at 10:07

## 2023-07-24 RX ADMIN — ALUMINUM HYDROXIDE, MAGNESIUM HYDROXIDE, AND DIMETHICONE 30 ML: 200; 20; 200 SUSPENSION ORAL at 02:07

## 2023-07-24 RX ADMIN — MORPHINE SULFATE 4 MG: 4 INJECTION INTRAVENOUS at 10:07

## 2023-07-24 NOTE — ED PROVIDER NOTES
SCRIBE #1 NOTE: I, Maurizio Welch, am scribing for, and in the presence of, Gayatri Raza DO. I have scribed the entire note.       History     Chief Complaint   Patient presents with    Chest Pain     Pt c/o chest pain x 3 weeks also having some nausea and sob     Review of patient's allergies indicates:   Allergen Reactions    Penicillins Hives    Aspirin Other (See Comments)     Stomach Pain ( Sharp )          History of Present Illness     HPI    2023, 2:39 PM  History obtained from the patient      History of Present Illness: Dalia Ozuna is a 47 y.o. female patient with a PMHx of HTN and pneumonia who presents to the Emergency Department for evaluation of left sided CP which onset three weeks ago. Pain is now substernal and pain radiates to left arm. Pt has a pain rating of 10/10. Symptoms are intermittent. No mitigating or exacerbating factors reported.  She tried baking soda for suspected acid reflux with no improvement.  Associated sxs include cough, N/V, fatigue, palpitations, chest tightness, and SOB when flat. Patient denies all other sxs at this time. No prior Tx. Pt is a smoker. Pt takes amlodipine and Zoloft. Pt denies any recent injuries, travel, or surgeries. No further complaints or concerns at this time.       Arrival mode: Personal vehicle     PCP: Franci Arteaga MD        Past Medical History:  Past Medical History:   Diagnosis Date    Essential hypertension     Pneumonia        Past Surgical History:  Past Surgical History:   Procedure Laterality Date     SECTION      ROTATOR CUFF REPAIR Right     ROTATOR CUFF REPAIR Right     TUBAL LIGATION           Family History:  Family History   Problem Relation Age of Onset    Breast cancer Maternal Grandmother        Social History:  Social History     Tobacco Use    Smoking status: Every Day     Packs/day: 2.00     Years: 26.00     Pack years: 52.00     Types: Cigarettes     Start date: 1998    Smokeless tobacco: Never     Tobacco comments:     currently taking chantix    Substance and Sexual Activity    Alcohol use: Not Currently    Drug use: Never    Sexual activity: Yes     Partners: Male        Review of Systems     Review of Systems   Constitutional:  Positive for fatigue. Negative for diaphoresis.   Respiratory:  Positive for cough, chest tightness and shortness of breath (when flat).    Cardiovascular:  Positive for chest pain and palpitations.   Gastrointestinal:  Positive for nausea and vomiting.   Neurological:  Negative for dizziness and light-headedness.      Physical Exam     Initial Vitals [07/24/23 1320]   BP Pulse Resp Temp SpO2   124/76 92 18 98 °F (36.7 °C) 100 %      MAP       --          Physical Exam  Nursing Notes and Vital Signs Reviewed.  Constitutional: Patient is in no acute distress. Well-developed and well-nourished.  Head: Atraumatic. Normocephalic.  Eyes: PERRL. EOM intact. Conjunctivae are not pale. No scleral icterus.  ENT: Mucous membranes are moist. Oropharynx is clear and symmetric.    Neck: Supple. Full ROM. No lymphadenopathy.  Cardiovascular: Regular rate. Regular rhythm. No murmurs, rubs, or gallops. Distal pulses are 2+ and symmetric.  Pulmonary/Chest: No respiratory distress. Clear to auscultation bilaterally. No wheezing or rales.  Abdominal: Soft and non-distended.  There is no tenderness.  No rebound, guarding, or rigidity. Good bowel sounds.  Genitourinary: No CVA tenderness  Musculoskeletal: Moves all extremities. No obvious deformities. No edema. No calf tenderness.  Skin: Warm and dry.  Neurological:  Alert, awake, and appropriate.  Normal speech.  No acute focal neurological deficits are appreciated.  Psychiatric: Normal affect. Good eye contact. Appropriate in content.     ED Course   Procedures  ED Vital Signs:  Vitals:    07/24/23 1320 07/24/23 1643 07/24/23 1644 07/24/23 1652   BP: 124/76 132/80  127/77   Pulse: 92 83 82 88   Resp: 18  20 15   Temp: 98 °F (36.7 °C)      TempSrc:  Oral      SpO2: 100%  98% 97%   Weight: 84.7 kg (186 lb 11.7 oz)      Height: 5' (1.524 m)       07/24/23 1732 07/24/23 1802 07/24/23 2209 07/24/23 2259   BP: 127/78 122/71 117/69    Pulse: 83 85 78    Resp: (!) 24 20 18 18   Temp:   98.7 °F (37.1 °C)    TempSrc:       SpO2: 100% 96% 100%    Weight:       Height:        07/25/23 0027 07/25/23 0502 07/25/23 0827 07/25/23 0834   BP: (!) 102/58 110/62 102/65 (!) 110/59   Pulse: 72 70 74    Resp: 17 17 16    Temp: 98.4 °F (36.9 °C) 98.5 °F (36.9 °C) 98.3 °F (36.8 °C)    TempSrc: Oral Oral Oral    SpO2: 98% 98% 97%    Weight:    84.4 kg (186 lb)   Height:    5' (1.524 m)    07/25/23 0918 07/25/23 1141   BP:     Pulse: 70 71   Resp:     Temp:     TempSrc:     SpO2:     Weight:     Height:         Abnormal Lab Results:  Labs Reviewed   CBC W/ AUTO DIFFERENTIAL - Abnormal; Notable for the following components:       Result Value    Hemoglobin 11.9 (*)     All other components within normal limits   COMPREHENSIVE METABOLIC PANEL - Abnormal; Notable for the following components:    Alkaline Phosphatase 54 (*)     All other components within normal limits   TROPONIN I   B-TYPE NATRIURETIC PEPTIDE   PREGNANCY TEST, URINE RAPID    Narrative:     Specimen Source->Urine   LIPASE   TROPONIN I        All Lab Results:  Results for orders placed or performed during the hospital encounter of 07/24/23   CBC auto differential   Result Value Ref Range    WBC 8.02 3.90 - 12.70 K/uL    RBC 4.08 4.00 - 5.40 M/uL    Hemoglobin 11.9 (L) 12.0 - 16.0 g/dL    Hematocrit 37.1 37.0 - 48.5 %    MCV 91 82 - 98 fL    MCH 29.2 27.0 - 31.0 pg    MCHC 32.1 32.0 - 36.0 g/dL    RDW 13.8 11.5 - 14.5 %    Platelets 266 150 - 450 K/uL    MPV 10.5 9.2 - 12.9 fL    Immature Granulocytes 0.1 0.0 - 0.5 %    Gran # (ANC) 4.8 1.8 - 7.7 K/uL    Immature Grans (Abs) 0.01 0.00 - 0.04 K/uL    Lymph # 2.6 1.0 - 4.8 K/uL    Mono # 0.5 0.3 - 1.0 K/uL    Eos # 0.1 0.0 - 0.5 K/uL    Baso # 0.03 0.00 - 0.20 K/uL    nRBC 0 0  /100 WBC    Gran % 59.8 38.0 - 73.0 %    Lymph % 31.9 18.0 - 48.0 %    Mono % 6.1 4.0 - 15.0 %    Eosinophil % 1.7 0.0 - 8.0 %    Basophil % 0.4 0.0 - 1.9 %    Differential Method Automated    Comprehensive metabolic panel   Result Value Ref Range    Sodium 139 136 - 145 mmol/L    Potassium 3.5 3.5 - 5.1 mmol/L    Chloride 107 95 - 110 mmol/L    CO2 24 23 - 29 mmol/L    Glucose 92 70 - 110 mg/dL    BUN 6 6 - 20 mg/dL    Creatinine 0.7 0.5 - 1.4 mg/dL    Calcium 8.7 8.7 - 10.5 mg/dL    Total Protein 6.8 6.0 - 8.4 g/dL    Albumin 3.8 3.5 - 5.2 g/dL    Total Bilirubin 0.2 0.1 - 1.0 mg/dL    Alkaline Phosphatase 54 (L) 55 - 135 U/L    AST 11 10 - 40 U/L    ALT 12 10 - 44 U/L    eGFR >60 >60 mL/min/1.73 m^2    Anion Gap 8 8 - 16 mmol/L   Troponin I #1   Result Value Ref Range    Troponin I <0.006 0.000 - 0.026 ng/mL   BNP   Result Value Ref Range    BNP 11 0 - 99 pg/mL   Pregnancy, urine rapid (UPT)   Result Value Ref Range    Preg Test, Ur Negative    Lipase   Result Value Ref Range    Lipase 15 4 - 60 U/L   Troponin I   Result Value Ref Range    Troponin I <0.006 0.000 - 0.026 ng/mL   Troponin I   Result Value Ref Range    Troponin I <0.006 0.000 - 0.026 ng/mL   CBC Auto Differential   Result Value Ref Range    WBC 8.08 3.90 - 12.70 K/uL    RBC 4.16 4.00 - 5.40 M/uL    Hemoglobin 12.0 12.0 - 16.0 g/dL    Hematocrit 38.4 37.0 - 48.5 %    MCV 92 82 - 98 fL    MCH 28.8 27.0 - 31.0 pg    MCHC 31.3 (L) 32.0 - 36.0 g/dL    RDW 13.8 11.5 - 14.5 %    Platelets 280 150 - 450 K/uL    MPV 10.2 9.2 - 12.9 fL    Immature Granulocytes 0.1 0.0 - 0.5 %    Gran # (ANC) 4.2 1.8 - 7.7 K/uL    Immature Grans (Abs) 0.01 0.00 - 0.04 K/uL    Lymph # 3.2 1.0 - 4.8 K/uL    Mono # 0.5 0.3 - 1.0 K/uL    Eos # 0.2 0.0 - 0.5 K/uL    Baso # 0.02 0.00 - 0.20 K/uL    nRBC 0 0 /100 WBC    Gran % 52.2 38.0 - 73.0 %    Lymph % 39.2 18.0 - 48.0 %    Mono % 6.3 4.0 - 15.0 %    Eosinophil % 2.0 0.0 - 8.0 %    Basophil % 0.2 0.0 - 1.9 %    Differential  Method Automated    Magnesium   Result Value Ref Range    Magnesium 2.2 1.6 - 2.6 mg/dL   Comprehensive Metabolic Panel   Result Value Ref Range    Sodium 138 136 - 145 mmol/L    Potassium 3.7 3.5 - 5.1 mmol/L    Chloride 107 95 - 110 mmol/L    CO2 25 23 - 29 mmol/L    Glucose 89 70 - 110 mg/dL    BUN 7 6 - 20 mg/dL    Creatinine 0.7 0.5 - 1.4 mg/dL    Calcium 8.4 (L) 8.7 - 10.5 mg/dL    Total Protein 6.8 6.0 - 8.4 g/dL    Albumin 3.8 3.5 - 5.2 g/dL    Total Bilirubin 0.2 0.1 - 1.0 mg/dL    Alkaline Phosphatase 52 (L) 55 - 135 U/L    AST 15 10 - 40 U/L    ALT 13 10 - 44 U/L    eGFR >60 >60 mL/min/1.73 m^2    Anion Gap 6 (L) 8 - 16 mmol/L   Troponin I   Result Value Ref Range    Troponin I <0.006 0.000 - 0.026 ng/mL   Hemoglobin A1C   Result Value Ref Range    Hemoglobin A1C 5.5 4.0 - 5.6 %    Estimated Avg Glucose 111 68 - 131 mg/dL   Lipid Panel   Result Value Ref Range    Cholesterol 173 120 - 199 mg/dL    Triglycerides 210 (H) 30 - 150 mg/dL    HDL 29 (L) 40 - 75 mg/dL    LDL Cholesterol 102.0 63.0 - 159.0 mg/dL    HDL/Cholesterol Ratio 16.8 (L) 20.0 - 50.0 %    Total Cholesterol/HDL Ratio 6.0 (H) 2.0 - 5.0    Non-HDL Cholesterol 144 mg/dL   Echo   Result Value Ref Range    BSA 1.89 m2    TDI SEPTAL 0.10 m/s    LV LATERAL E/E' RATIO 8.10 m/s    LV SEPTAL E/E' RATIO 8.10 m/s    LA WIDTH 4.00 cm    IVC diameter 1.63 cm    Left Ventricular Outflow Tract Mean Velocity 0.60 cm/s    Left Ventricular Outflow Tract Mean Gradient 1.65 mmHg    TDI LATERAL 0.10 m/s    LVIDd 4.53 3.5 - 6.0 cm    IVS 1.11 (A) 0.6 - 1.1 cm    Posterior Wall 1.20 (A) 0.6 - 1.1 cm    Ao root annulus 3.11 cm    LVIDs 3.33 2.1 - 4.0 cm    FS 26 28 - 44 %    LA volume 65.19 cm3    STJ 2.89 cm    Ascending aorta 2.82 cm    LV mass 189.51 g    LA size 4.06 cm    TAPSE 2.00 cm    Left Ventricle Relative Wall Thickness 0.53 cm    AV mean gradient 3 mmHg    AV valve area 3.87 cm2    AV Velocity Ratio 0.78     AV index (prosthetic) 1.01     E/A ratio  1.03     Mean e' 0.10 m/s    E wave deceleration time 217.15 msec    IVRT 87.54 msec    LVOT diameter 2.21 cm    LVOT area 3.8 cm2    LVOT peak chapito 0.87 m/s    LVOT peak VTI 20.80 cm    Ao peak chapito 1.12 m/s    Ao VTI 20.6 cm    RVOT peak chapito 0.62 m/s    RVOT peak VTI 18.6 cm    LVOT stroke volume 79.75 cm3    AV peak gradient 5 mmHg    PV mean gradient 0.91 mmHg    E/E' ratio 8.10 m/s    MV Peak E Chapito 0.81 m/s    TR Max Chapito 2.40 m/s    MV Peak A Chapito 0.79 m/s    LV Systolic Volume 45.10 mL    LV Systolic Volume Index 24.9 mL/m2    LV Diastolic Volume 94.13 mL    LV Diastolic Volume Index 52.01 mL/m2    LA Volume Index 36.0 mL/m2    LV Mass Index 105 g/m2    RA Major Axis 4.24 cm    Left Atrium Minor Axis 4.42 cm    Left Atrium Major Axis 5.07 cm    Triscuspid Valve Regurgitation Peak Gradient 23 mmHg    RA Width 4.00 cm    Right Atrial Pressure (from IVC) 8 mmHg    EF 50 %    TV rest pulmonary artery pressure 31 mmHg         Imaging Results:  Imaging Results              X-Ray Chest AP Portable (Final result)  Result time 07/24/23 15:14:42      Final result by YOSELYN Ashley Sr., MD (07/24/23 15:14:42)                   Impression:      1. The lungs are clear.  2. There is prominence of the width of the right acromioclavicular joint. It measures 11 mm in width.  .      Electronically signed by: Germán Ashley MD  Date:    07/24/2023  Time:    15:14               Narrative:    EXAMINATION:  XR CHEST AP PORTABLE    CLINICAL HISTORY:  Chest Pain;    COMPARISON:  05/04/2023    FINDINGS:  The size of the heart is normal. The lungs are clear. There is no pneumothorax.  The costophrenic angles are sharp.  There is prominence of the width of the right acromioclavicular joint.  It measures 11 mm in width.                                       The EKG was ordered, reviewed, and independently interpreted by the ED provider.  Interpretation time: 13:21  Rate: 89 BPM  Rhythm: normal sinus rhythm  Interpretation: Possible  Anterior infarct (cited on or before 14-JUN-2022). No STEMI.             The Emergency Provider reviewed the vital signs and test results, which are outlined above.     ED Discussion         6:04 PM: Pt's CP went from a 10/10 to a 4/10 after nitro SL x3. No change with Maalox. Declines ASA do to GI upset.       7:33 PM: Discussed pt's case with Waleska Goode MD (Cardiology) who agrees with Dr. Raza for OBS to .        7:35 PM: Discussed case with Danielle Reinoso NP  (Hospital Medicine). Dr. Gregory agrees with current care and management of pt and accepts admission.   Admitting Service:   Admitting Physician: Dr. Gregory  Admit to: OBS Med Tele     7:35 PM: Re-evaluated pt. I have discussed test results, shared treatment plan, and the need for admission with patient and family at bedside. Pt and family express understanding at this time and agree with all information. All questions answered. Pt and family have no further questions or concerns at this time. Pt is ready for admit.     ED Course as of 07/25/23 1213   Mon Jul 24, 2023   1411 EKG shows nonspecific T-wave changes with poor R-wave progression.  WA interval 152.  QRS 78.  .  [NF]   Tue Jul 25, 2023   1212 47-year-old female with a history of hypertension and tobacco use chest pain unrelieved with Maalox.  Improved with nitroglycerin.  Associated symptoms that are concerning include vomiting and radiation to extremity.  Troponin negative however her EKG shows nonspecific T-wave changes.  CBC shows no leukocytosis indicate infection and H&H shows no significant anemia.  CMP shows normal renal function LFTs, electrolytes.  BNP negative for CHF.  Chest x-ray shows no signs of pneumothorax, widened mediastinum to indicate thoracic aortic dissection, or free air to indicate ruptured esophagus.  Lipase negative for acute pancreatitis and pregnancy negative. [NF]      ED Course User Index  [NF] Gayatri Raza DO     Medical Decision Making:   Clinical  Tests:   Lab Tests: Ordered and Reviewed  Radiological Study: Ordered and Reviewed  Medical Tests: Ordered and Reviewed   Additional MDM:   PERC Rule:   Age is greater than or equal to 50 = 0.0  Heart Rate is greater than or equal to 100 = 0.0  SaO2 on room air < 95% = 0.0  Unilateral leg swelling = 0.0  Hemoptysis = 0.0  Recent surgery or trauma = 0.0  Prior PE or DVT =  0.0  Hormone use = 0.00  PERC Score = 0  Heart Score:    History:          Highly suspicious.  ECG:             Nonspecific repolarisation disturbance  Age:               Less than 45 years  Risk factors: 1-2 risk factors  Troponin:       Less than or equal to normal limit  Final Score: 4       ED Medication(s):  Medications   acetaminophen tablet 650 mg (650 mg Oral Given 7/25/23 0527)   ondansetron injection 4 mg (has no administration in time range)   nicotine 21 mg/24 hr 1 patch (1 patch Transdermal Patch Applied 7/25/23 0827)   diphenhydrAMINE capsule 25 mg (25 mg Oral Given 7/25/23 1102)   guaiFENesin 100 mg/5 ml syrup 200 mg (200 mg Oral Given 7/25/23 0527)   aluminum-magnesium hydroxide-simethicone 200-200-20 mg/5 mL suspension 30 mL (has no administration in time range)   albuterol-ipratropium 2.5 mg-0.5 mg/3 mL nebulizer solution 3 mL (has no administration in time range)   morphine injection 4 mg (4 mg Intravenous Given 7/25/23 0827)   morphine injection 2 mg (has no administration in time range)   hydrALAZINE injection 10 mg (has no administration in time range)   enoxaparin injection 40 mg (40 mg Subcutaneous Given 7/24/23 2017)   ALPRAZolam tablet 0.5 mg (0.5 mg Oral Given 7/25/23 1102)   amLODIPine tablet 10 mg (10 mg Oral Given 7/25/23 0828)   pantoprazole EC tablet 40 mg (40 mg Oral Given 7/25/23 0828)   metoprolol tartrate (LOPRESSOR) tablet 25 mg (25 mg Oral Given 7/25/23 0828)   atorvastatin tablet 40 mg (40 mg Oral Given 7/24/23 2209)   aluminum-magnesium hydroxide-simethicone 200-200-20 mg/5 mL suspension 30 mL (30 mLs Oral  Given 7/24/23 1423)   nitroGLYCERIN SL tablet 0.4 mg (0.4 mg Sublingual Given 7/24/23 1645)   ondansetron injection 4 mg (4 mg Intravenous Given 7/24/23 1750)   nitroGLYCERIN SL tablet 0.4 mg (0.4 mg Sublingual Given 7/24/23 1750)   clopidogreL tablet 75 mg (75 mg Oral Given 7/24/23 2209)       Current Discharge Medication List                  Scribe Attestation:   Scribe #1: I performed the above scribed service and the documentation accurately describes the services I performed. I attest to the accuracy of the note.     Attending:   Physician Attestation Statement for Scribe #1: I, Gayatri Raza DO, personally performed the services described in this documentation, as scribed by Maurizio Welch, in my presence, and it is both accurate and complete.           Clinical Impression       ICD-10-CM ICD-9-CM   1. Chest pain  R07.9 786.50       Disposition:   Disposition: Placed in Observation  Condition: Stable      Gayatri Raza DO  07/25/23 1214

## 2023-07-25 ENCOUNTER — CLINICAL SUPPORT (OUTPATIENT)
Dept: SMOKING CESSATION | Facility: CLINIC | Age: 48
End: 2023-07-25
Payer: COMMERCIAL

## 2023-07-25 DIAGNOSIS — F17.200 NICOTINE DEPENDENCE: Primary | ICD-10-CM

## 2023-07-25 PROBLEM — I73.9 CLAUDICATION: Status: ACTIVE | Noted: 2023-07-25

## 2023-07-25 LAB
ALBUMIN SERPL BCP-MCNC: 3.8 G/DL (ref 3.5–5.2)
ALP SERPL-CCNC: 52 U/L (ref 55–135)
ALT SERPL W/O P-5'-P-CCNC: 13 U/L (ref 10–44)
ANION GAP SERPL CALC-SCNC: 6 MMOL/L (ref 8–16)
AORTIC ROOT ANNULUS: 3.11 CM
ASCENDING AORTA: 2.82 CM
AST SERPL-CCNC: 15 U/L (ref 10–40)
AV INDEX (PROSTH): 1.01
AV MEAN GRADIENT: 3 MMHG
AV PEAK GRADIENT: 5 MMHG
AV VALVE AREA: 3.87 CM2
AV VELOCITY RATIO: 0.78
BASOPHILS # BLD AUTO: 0.02 K/UL (ref 0–0.2)
BASOPHILS NFR BLD: 0.2 % (ref 0–1.9)
BILIRUB SERPL-MCNC: 0.2 MG/DL (ref 0.1–1)
BSA FOR ECHO PROCEDURE: 1.89 M2
BUN SERPL-MCNC: 7 MG/DL (ref 6–20)
CALCIUM SERPL-MCNC: 8.4 MG/DL (ref 8.7–10.5)
CHLORIDE SERPL-SCNC: 107 MMOL/L (ref 95–110)
CHOLEST SERPL-MCNC: 173 MG/DL (ref 120–199)
CHOLEST/HDLC SERPL: 6 {RATIO} (ref 2–5)
CO2 SERPL-SCNC: 25 MMOL/L (ref 23–29)
CREAT SERPL-MCNC: 0.7 MG/DL (ref 0.5–1.4)
CV ECHO LV RWT: 0.53 CM
CV STRESS BASE HR: 70 BPM
DIASTOLIC BLOOD PRESSURE: 81 MMHG
DIFFERENTIAL METHOD: ABNORMAL
DOP CALC AO PEAK VEL: 1.12 M/S
DOP CALC AO VTI: 20.6 CM
DOP CALC LVOT AREA: 3.8 CM2
DOP CALC LVOT DIAMETER: 2.21 CM
DOP CALC LVOT PEAK VEL: 0.87 M/S
DOP CALC LVOT STROKE VOLUME: 79.75 CM3
DOP CALC RVOT PEAK VEL: 0.62 M/S
DOP CALC RVOT VTI: 18.6 CM
DOP CALCLVOT PEAK VEL VTI: 20.8 CM
E WAVE DECELERATION TIME: 217.15 MSEC
E/A RATIO: 1.03
E/E' RATIO: 8.1 M/S
ECHO LV POSTERIOR WALL: 1.2 CM (ref 0.6–1.1)
EJECTION FRACTION- HIGH: 73 %
EJECTION FRACTION: 50 %
END DIASTOLIC INDEX-HIGH: 165 ML/M2
END DIASTOLIC INDEX-LOW: 101 ML/M2
END SYSTOLIC INDEX-HIGH: 64 ML/M2
END SYSTOLIC INDEX-LOW: 28 ML/M2
EOSINOPHIL # BLD AUTO: 0.2 K/UL (ref 0–0.5)
EOSINOPHIL NFR BLD: 2 % (ref 0–8)
ERYTHROCYTE [DISTWIDTH] IN BLOOD BY AUTOMATED COUNT: 13.8 % (ref 11.5–14.5)
EST. GFR  (NO RACE VARIABLE): >60 ML/MIN/1.73 M^2
ESTIMATED AVG GLUCOSE: 111 MG/DL (ref 68–131)
FRACTIONAL SHORTENING: 26 % (ref 28–44)
GLUCOSE SERPL-MCNC: 89 MG/DL (ref 70–110)
HBA1C MFR BLD: 5.5 % (ref 4–5.6)
HCT VFR BLD AUTO: 38.4 % (ref 37–48.5)
HDLC SERPL-MCNC: 29 MG/DL (ref 40–75)
HDLC SERPL: 16.8 % (ref 20–50)
HGB BLD-MCNC: 12 G/DL (ref 12–16)
IMM GRANULOCYTES # BLD AUTO: 0.01 K/UL (ref 0–0.04)
IMM GRANULOCYTES NFR BLD AUTO: 0.1 % (ref 0–0.5)
INTERVENTRICULAR SEPTUM: 1.11 CM (ref 0.6–1.1)
IVC DIAMETER: 1.63 CM
IVRT: 87.54 MSEC
LA MAJOR: 5.07 CM
LA MINOR: 4.42 CM
LA WIDTH: 4 CM
LDLC SERPL CALC-MCNC: 102 MG/DL (ref 63–159)
LEFT ATRIUM SIZE: 4.06 CM
LEFT ATRIUM VOLUME INDEX: 36 ML/M2
LEFT ATRIUM VOLUME: 65.19 CM3
LEFT INTERNAL DIMENSION IN SYSTOLE: 3.33 CM (ref 2.1–4)
LEFT VENTRICLE DIASTOLIC VOLUME INDEX: 52.01 ML/M2
LEFT VENTRICLE DIASTOLIC VOLUME: 94.13 ML
LEFT VENTRICLE MASS INDEX: 105 G/M2
LEFT VENTRICLE SYSTOLIC VOLUME INDEX: 24.9 ML/M2
LEFT VENTRICLE SYSTOLIC VOLUME: 45.1 ML
LEFT VENTRICULAR INTERNAL DIMENSION IN DIASTOLE: 4.53 CM (ref 3.5–6)
LEFT VENTRICULAR MASS: 189.51 G
LV LATERAL E/E' RATIO: 8.1 M/S
LV SEPTAL E/E' RATIO: 8.1 M/S
LVOT MG: 1.65 MMHG
LVOT MV: 0.6 CM/S
LYMPHOCYTES # BLD AUTO: 3.2 K/UL (ref 1–4.8)
LYMPHOCYTES NFR BLD: 39.2 % (ref 18–48)
MAGNESIUM SERPL-MCNC: 2.2 MG/DL (ref 1.6–2.6)
MCH RBC QN AUTO: 28.8 PG (ref 27–31)
MCHC RBC AUTO-ENTMCNC: 31.3 G/DL (ref 32–36)
MCV RBC AUTO: 92 FL (ref 82–98)
MONOCYTES # BLD AUTO: 0.5 K/UL (ref 0.3–1)
MONOCYTES NFR BLD: 6.3 % (ref 4–15)
MV PEAK A VEL: 0.79 M/S
MV PEAK E VEL: 0.81 M/S
NEUTROPHILS # BLD AUTO: 4.2 K/UL (ref 1.8–7.7)
NEUTROPHILS NFR BLD: 52.2 % (ref 38–73)
NONHDLC SERPL-MCNC: 144 MG/DL
NRBC BLD-RTO: 0 /100 WBC
NUC REST EJECTION FRACTION: 62
NUC STRESS EJECTION FRACTION: 64 %
OHS CV CPX 85 PERCENT MAX PREDICTED HEART RATE MALE: 141
OHS CV CPX MAX PREDICTED HEART RATE: 166
OHS CV CPX PATIENT IS FEMALE: 1
OHS CV CPX PATIENT IS MALE: 0
OHS CV CPX PEAK DIASTOLIC BLOOD PRESSURE: 81 MMHG
OHS CV CPX PEAK HEAR RATE: 102 BPM
OHS CV CPX PEAK RATE PRESSURE PRODUCT: NORMAL
OHS CV CPX PEAK SYSTOLIC BLOOD PRESSURE: 114 MMHG
OHS CV CPX PERCENT MAX PREDICTED HEART RATE ACHIEVED: 62
OHS CV CPX RATE PRESSURE PRODUCT PRESENTING: 7980
PISA TR MAX VEL: 2.4 M/S
PLATELET # BLD AUTO: 280 K/UL (ref 150–450)
PMV BLD AUTO: 10.2 FL (ref 9.2–12.9)
POTASSIUM SERPL-SCNC: 3.7 MMOL/L (ref 3.5–5.1)
PROT SERPL-MCNC: 6.8 G/DL (ref 6–8.4)
PV MEAN GRADIENT: 0.91 MMHG
RA MAJOR: 4.24 CM
RA PRESSURE: 8 MMHG
RA WIDTH: 4 CM
RBC # BLD AUTO: 4.16 M/UL (ref 4–5.4)
RETIRED EF AND QEF - SEE NOTES: 59 %
SODIUM SERPL-SCNC: 138 MMOL/L (ref 136–145)
STJ: 2.89 CM
SYSTOLIC BLOOD PRESSURE: 114 MMHG
TDI LATERAL: 0.1 M/S
TDI SEPTAL: 0.1 M/S
TDI: 0.1 M/S
TR MAX PG: 23 MMHG
TRICUSPID ANNULAR PLANE SYSTOLIC EXCURSION: 2 CM
TRIGL SERPL-MCNC: 210 MG/DL (ref 30–150)
TROPONIN I SERPL DL<=0.01 NG/ML-MCNC: <0.006 NG/ML (ref 0–0.03)
TV REST PULMONARY ARTERY PRESSURE: 31 MMHG
WBC # BLD AUTO: 8.08 K/UL (ref 3.9–12.7)

## 2023-07-25 PROCEDURE — 93005 ELECTROCARDIOGRAM TRACING: CPT

## 2023-07-25 PROCEDURE — 99204 PR OFFICE/OUTPT VISIT, NEW, LEVL IV, 45-59 MIN: ICD-10-PCS | Mod: 25,,, | Performed by: PHYSICIAN ASSISTANT

## 2023-07-25 PROCEDURE — 83036 HEMOGLOBIN GLYCOSYLATED A1C: CPT | Performed by: INTERNAL MEDICINE

## 2023-07-25 PROCEDURE — 63600175 PHARM REV CODE 636 W HCPCS: Performed by: INTERNAL MEDICINE

## 2023-07-25 PROCEDURE — 80053 COMPREHEN METABOLIC PANEL: CPT | Performed by: INTERNAL MEDICINE

## 2023-07-25 PROCEDURE — 84484 ASSAY OF TROPONIN QUANT: CPT | Mod: 91 | Performed by: INTERNAL MEDICINE

## 2023-07-25 PROCEDURE — 83735 ASSAY OF MAGNESIUM: CPT | Performed by: INTERNAL MEDICINE

## 2023-07-25 PROCEDURE — 99407 PR TOBACCO USE CESSATION INTENSIVE >10 MINUTES: ICD-10-PCS | Mod: S$GLB,,,

## 2023-07-25 PROCEDURE — 85025 COMPLETE CBC W/AUTO DIFF WBC: CPT | Performed by: INTERNAL MEDICINE

## 2023-07-25 PROCEDURE — 93010 EKG 12-LEAD: ICD-10-PCS | Mod: ,,, | Performed by: INTERNAL MEDICINE

## 2023-07-25 PROCEDURE — 25000003 PHARM REV CODE 250: Performed by: INTERNAL MEDICINE

## 2023-07-25 PROCEDURE — 96372 THER/PROPH/DIAG INJ SC/IM: CPT | Mod: 59 | Performed by: INTERNAL MEDICINE

## 2023-07-25 PROCEDURE — 96376 TX/PRO/DX INJ SAME DRUG ADON: CPT

## 2023-07-25 PROCEDURE — S4991 NICOTINE PATCH NONLEGEND: HCPCS | Performed by: INTERNAL MEDICINE

## 2023-07-25 PROCEDURE — 25000242 PHARM REV CODE 250 ALT 637 W/ HCPCS: Performed by: HOSPITALIST

## 2023-07-25 PROCEDURE — 36415 COLL VENOUS BLD VENIPUNCTURE: CPT | Performed by: HOSPITALIST

## 2023-07-25 PROCEDURE — 36415 COLL VENOUS BLD VENIPUNCTURE: CPT | Performed by: INTERNAL MEDICINE

## 2023-07-25 PROCEDURE — G0378 HOSPITAL OBSERVATION PER HR: HCPCS

## 2023-07-25 PROCEDURE — 84484 ASSAY OF TROPONIN QUANT: CPT | Performed by: INTERNAL MEDICINE

## 2023-07-25 PROCEDURE — 80061 LIPID PANEL: CPT | Performed by: INTERNAL MEDICINE

## 2023-07-25 PROCEDURE — 99407 BEHAV CHNG SMOKING > 10 MIN: CPT | Mod: S$GLB,,,

## 2023-07-25 PROCEDURE — 63600175 PHARM REV CODE 636 W HCPCS: Performed by: HOSPITALIST

## 2023-07-25 PROCEDURE — 84484 ASSAY OF TROPONIN QUANT: CPT | Mod: 91 | Performed by: HOSPITALIST

## 2023-07-25 PROCEDURE — 93010 ELECTROCARDIOGRAM REPORT: CPT | Mod: ,,, | Performed by: INTERNAL MEDICINE

## 2023-07-25 PROCEDURE — 99204 OFFICE O/P NEW MOD 45 MIN: CPT | Mod: 25,,, | Performed by: PHYSICIAN ASSISTANT

## 2023-07-25 RX ORDER — REGADENOSON 0.08 MG/ML
0.4 INJECTION, SOLUTION INTRAVENOUS ONCE
Status: COMPLETED | OUTPATIENT
Start: 2023-07-25 | End: 2023-07-25

## 2023-07-25 RX ORDER — TRAMADOL HYDROCHLORIDE 50 MG/1
50 TABLET ORAL EVERY 6 HOURS PRN
Status: DISCONTINUED | OUTPATIENT
Start: 2023-07-25 | End: 2023-07-26 | Stop reason: HOSPADM

## 2023-07-25 RX ORDER — NITROGLYCERIN 0.4 MG/1
0.4 TABLET SUBLINGUAL EVERY 5 MIN PRN
Status: DISCONTINUED | OUTPATIENT
Start: 2023-07-25 | End: 2023-07-26 | Stop reason: HOSPADM

## 2023-07-25 RX ADMIN — METOPROLOL TARTRATE 25 MG: 25 TABLET, FILM COATED ORAL at 08:07

## 2023-07-25 RX ADMIN — NITROGLYCERIN 0.4 MG: 0.4 TABLET SUBLINGUAL at 06:07

## 2023-07-25 RX ADMIN — PANTOPRAZOLE SODIUM 40 MG: 40 TABLET, DELAYED RELEASE ORAL at 08:07

## 2023-07-25 RX ADMIN — NICOTINE 1 PATCH: 21 PATCH, EXTENDED RELEASE TRANSDERMAL at 08:07

## 2023-07-25 RX ADMIN — ACETAMINOPHEN 650 MG: 325 TABLET ORAL at 05:07

## 2023-07-25 RX ADMIN — ALPRAZOLAM 0.5 MG: 0.5 TABLET ORAL at 11:07

## 2023-07-25 RX ADMIN — ATORVASTATIN CALCIUM 40 MG: 40 TABLET, FILM COATED ORAL at 08:07

## 2023-07-25 RX ADMIN — MORPHINE SULFATE 4 MG: 4 INJECTION INTRAVENOUS at 08:07

## 2023-07-25 RX ADMIN — ALPRAZOLAM 0.5 MG: 0.5 TABLET ORAL at 08:07

## 2023-07-25 RX ADMIN — GUAIFENESIN 200 MG: 200 SOLUTION ORAL at 05:07

## 2023-07-25 RX ADMIN — DIPHENHYDRAMINE HYDROCHLORIDE 25 MG: 25 CAPSULE ORAL at 11:07

## 2023-07-25 RX ADMIN — AMLODIPINE BESYLATE 10 MG: 10 TABLET ORAL at 08:07

## 2023-07-25 RX ADMIN — ENOXAPARIN SODIUM 40 MG: 40 INJECTION SUBCUTANEOUS at 06:07

## 2023-07-25 RX ADMIN — REGADENOSON 0.4 MG: 0.08 INJECTION, SOLUTION INTRAVENOUS at 03:07

## 2023-07-25 RX ADMIN — MORPHINE SULFATE 4 MG: 4 INJECTION INTRAVENOUS at 06:07

## 2023-07-25 NOTE — PROGRESS NOTES
[facility] Medicine  Progress Note    Patient Name: Dalia Ozuna  MRN: [id number]  Patient Class: OP- Observation   Admission Date: 7/24/2023  Length of Stay: 0 days  Attending Physician: Dylon oMsley MD  Primary Care Provider: Franci Arteaga MD        Subjective:     Principal Problem:Chest pain        HPI:  Ms. Ozuna is a 47-year-old  female with PMH significant for HTN, tobacco use, presented to the ED complaining of few weeks of intermittent chest pain, worsened with exertion, associated with nausea without vomiting, worsening dyspnea on exertion.  No prior known history of CAD.  Never had cardiac interventions in the past.  Reports chest discomfort as burning, radiating to the left shoulder, left arm, intermittent, exacerbated by stress/exertion, relieved with rest.  Never seen Cardiology before.  Currently chest pain-free.  EKG NSR with no ST or T-wave changes.  Initial troponin within normal limits.  Patient intolerant to aspirin, states it burns in her stomach.  ED physician discussed case with Cardiology on-call, recommended placing in observation.      Placed in observation for chest pain, rule out ACS.      Overview/Hospital Course:  7/25/23  NAEON. Patient reports continued chest pain, improved since admit  EKG with inverted T waves inversions noted  Troponin trend WNL  Cardiology following, TTE and MPI pending        Review of Systems   All other systems reviewed and are negative.  Objective:     Vital Signs (Most Recent):  Temp: 98.4 °F (36.9 °C) (07/25/23 1222)  Pulse: 78 (07/25/23 1222)  Resp: 18 (07/25/23 1222)  BP: 123/78 (07/25/23 1222)  SpO2: 99 % (07/25/23 1222) Vital Signs (24h Range):  Temp:  [98.3 °F (36.8 °C)-98.7 °F (37.1 °C)] 98.4 °F (36.9 °C)  Pulse:  [70-88] 78  Resp:  [15-24] 18  SpO2:  [96 %-100 %] 99 %  BP: (102-132)/(58-80) 123/78     Weight: 84.4 kg (186 lb)  Body mass index is 36.33 kg/m².  No intake or output data in the 24  hours ending 07/25/23 1330      Physical Exam  Vitals and nursing note reviewed.   Constitutional:       General: She is not in acute distress.     Appearance: Normal appearance. She is normal weight. She is ill-appearing.   Cardiovascular:      Rate and Rhythm: Normal rate and regular rhythm.      Heart sounds: No murmur heard.  Pulmonary:      Effort: Pulmonary effort is normal. No respiratory distress.      Breath sounds: No wheezing.   Neurological:      General: No focal deficit present.      Mental Status: She is alert and oriented to person, place, and time.   Psychiatric:         Mood and Affect: Mood normal.         Behavior: Behavior normal.           Significant Labs: All pertinent labs within the past 24 hours have been reviewed.  Recent Lab Results  (Last 5 results in the past 24 hours)        07/25/23  0938   07/25/23  0545   07/25/23  0011   07/24/23  1753   07/24/23  1644        Albumin   3.8             Alkaline Phosphatase   52             ALT   13             Anion Gap   6             Ao root annulus 3.11               Ascending aorta 2.82               Ao peak nyasia 1.12               Ao VTI 20.6               AST   15             AV valve area 3.87               AV mean gradient 3               AV index (prosthetic) 1.01               AV peak gradient 5               AV Velocity Ratio 0.78               Baso #   0.02             Basophil %   0.2             BILIRUBIN TOTAL   0.2  Comment: For infants and newborns, interpretation of results should be based  on gestational age, weight and in agreement with clinical  observations.    Premature Infant recommended reference ranges:  Up to 24 hours.............<8.0 mg/dL  Up to 48 hours............<12.0 mg/dL  3-5 days..................<15.0 mg/dL  6-29 days.................<15.0 mg/dL               BSA 1.89               BUN   7             Calcium   8.4             Chloride   107             Cholesterol   173  Comment: The National Cholesterol  Education Program (NCEP) has set the  following guidelines (reference ranges) for Cholesterol:  Optimal.....................<200 mg/dL  Borderline High.............200-239 mg/dL  High........................> or = 240 mg/dL               CO2   25             Creatinine   0.7             Left Ventricle Relative Wall Thickness 0.53               Differential Method   Automated             E/A ratio 1.03               E/E' ratio 8.10               eGFR   >60             EF 50               Eos #   0.2             Eosinophil %   2.0             Estimated Avg Glucose   111             E wave deceleration time 217.15               FS 26               Glucose   89             Gran # (ANC)   4.2             Gran %   52.2             HDL   29  Comment: The National Cholesterol Education Program (NCEP) has set the  following guidelines (reference values) for HDL Cholesterol:  Low...............<40 mg/dL  Optimal...........>60 mg/dL               HDL/Cholesterol Ratio   16.8             Hematocrit   38.4             Hemoglobin   12.0             Hemoglobin A1C External   5.5  Comment: ADA Screening Guidelines:  5.7-6.4%  Consistent with prediabetes  >or=6.5%  Consistent with diabetes    High levels of fetal hemoglobin interfere with the HbA1C  assay. Heterozygous hemoglobin variants (HbS, HgC, etc)do  not significantly interfere with this assay.   However, presence of multiple variants may affect accuracy.               Immature Grans (Abs)   0.01  Comment: Mild elevation in immature granulocytes is non specific and   can be seen in a variety of conditions including stress response,   acute inflammation, trauma and pregnancy. Correlation with other   laboratory and clinical findings is essential.               Immature Granulocytes   0.1             IVC diameter 1.63               IVRT 87.54               IVSd 1.11               LA WIDTH 4.00               Left Atrium Major Axis 5.07               Left Atrium Minor Axis 4.42                LA size 4.06               LA volume 65.19               LA vol index 36.0               LVOT area 3.8               LDL Cholesterol External   102.0  Comment: The National Cholesterol Education Program (NCEP) has set the  following guidelines (reference values) for LDL Cholesterol:  Optimal.......................<130 mg/dL  Borderline High...............130-159 mg/dL  High..........................160-189 mg/dL  Very High.....................>190 mg/dL               LV LATERAL E/E' RATIO 8.10               LV SEPTAL E/E' RATIO 8.10               LV EDV BP 94.13               LV Diastolic Volume Index 52.01               LVIDd 4.53               LVIDs 3.33               LV mass 189.51               LV Mass Index 105               Left Ventricular Outflow Tract Mean Gradient 1.65               Left Ventricular Outflow Tract Mean Velocity 0.60               LVOT diameter 2.21               LVOT peak chapito 0.87               LVOT stroke volume 79.75               LVOT peak VTI 20.80               LV ESV BP 45.10               LV Systolic Volume Index 24.9               Lymph #   3.2             Lymph %   39.2             Magnesium   2.2             MCH   28.8             MCHC   31.3             MCV   92             Mean e' 0.10               Mono #   0.5             Mono %   6.3             MPV   10.2             MV Peak A Chapito 0.79               MV Peak E Chapito 0.81               Non-HDL Cholesterol   144  Comment: Risk category and Non-HDL cholesterol goals:  Coronary heart disease (CHD)or equivalent (10-year risk of CHD >20%):  Non-HDL cholesterol goal     <130 mg/dL  Two or more CHD risk factors and 10-year risk of CHD <= 20%:  Non-HDL cholesterol goal     <160 mg/dL  0 to 1 CHD risk factor:  Non-HDL cholesterol goal     <190 mg/dL               nRBC   0             Platelets   280             Potassium   3.7             Preg Test, Ur         Negative       PROTEIN TOTAL   6.8             PV mean gradient  0.91               Posterior Wall 1.20               Right Atrial Pressure (from IVC) 8               RA Major Axis 4.24               RA Width 4.00               RBC   4.16             RDW   13.8             RVOT peak chapito 0.62               RVOT peak VTI 18.6               Sodium   138             STJ 2.89               TAPSE 2.00               TDI SEPTAL 0.10               TDI LATERAL 0.10               Total Cholesterol/HDL Ratio   6.0             Triglycerides   210  Comment: The National Cholesterol Education Program (NCEP) has set the  following guidelines (reference values) for triglycerides:  Normal......................<150 mg/dL  Borderline High.............150-199 mg/dL  High........................200-499 mg/dL               Triscuspid Valve Regurgitation Peak Gradient 23               TR Max Chapito 2.40               Troponin I   <0.006  Comment: The reference interval for Troponin I represents the 99th percentile   cutoff   for our facility and is consistent with 3rd generation assay   performance.     <0.006  Comment: The reference interval for Troponin I represents the 99th percentile   cutoff   for our facility and is consistent with 3rd generation assay   performance.     <0.006  Comment: The reference interval for Troponin I represents the 99th percentile   cutoff   for our facility and is consistent with 3rd generation assay   performance.           TV rest pulmonary artery pressure 31               WBC   8.08                                    Significant Imaging: I have reviewed all pertinent imaging results/findings within the past 24 hours.    X-Ray Chest AP Portable   Final Result      1. The lungs are clear.   2. There is prominence of the width of the right acromioclavicular joint. It measures 11 mm in width.   .         Electronically signed by: Germán Ashley MD   Date:    07/24/2023   Time:    15:14      US Lower Extrem Arteries Bilat with LUIS (xpd)    (Results Pending)            Assessment/Plan:      * Chest pain  -currently chest pain-free.  -trend cardiac enzymes.  -intolerant to aspirin, Plavix 75 p.o. x1, beta-blocker, statin.  -cardiology consult.  -keep NPO after midnight    7/25  -Cardiology following  -TTE and MPI pending      Anxiety  -Xanax 0.5 mg p.o. b.i.d. as needed    Primary hypertension  -continue home dose amlodipine.    -add low-dose metoprolol.      Smoker  -nicotine patch  -cessation counseling       VTE Risk Mitigation (From admission, onward)         Ordered     enoxaparin injection 40 mg  Daily         07/24/23 1956     Place sequential compression device  Until discontinued         07/24/23 1956                Discharge Planning   KEITH:      Code Status: Full Code   Is the patient medically ready for discharge?:     Reason for patient still in hospital (select all that apply): Patient trending condition, Laboratory test, Treatment and Consult recommendations  Discharge Plan A: Home with family                  Dylon Mosley MD  Department of Hospital Medicine   [facility] - Telemetry ([facility])

## 2023-07-25 NOTE — SUBJECTIVE & OBJECTIVE
Review of Systems   All other systems reviewed and are negative.  Objective:     Vital Signs (Most Recent):  Temp: 98.4 °F (36.9 °C) (07/25/23 1222)  Pulse: 78 (07/25/23 1222)  Resp: 18 (07/25/23 1222)  BP: 123/78 (07/25/23 1222)  SpO2: 99 % (07/25/23 1222) Vital Signs (24h Range):  Temp:  [98.3 °F (36.8 °C)-98.7 °F (37.1 °C)] 98.4 °F (36.9 °C)  Pulse:  [70-88] 78  Resp:  [15-24] 18  SpO2:  [96 %-100 %] 99 %  BP: (102-132)/(58-80) 123/78     Weight: 84.4 kg (186 lb)  Body mass index is 36.33 kg/m².  No intake or output data in the 24 hours ending 07/25/23 1330      Physical Exam  Vitals and nursing note reviewed.   Constitutional:       General: She is not in acute distress.     Appearance: Normal appearance. She is normal weight. She is ill-appearing.   Cardiovascular:      Rate and Rhythm: Normal rate and regular rhythm.      Heart sounds: No murmur heard.  Pulmonary:      Effort: Pulmonary effort is normal. No respiratory distress.      Breath sounds: No wheezing.   Neurological:      General: No focal deficit present.      Mental Status: She is alert and oriented to person, place, and time.   Psychiatric:         Mood and Affect: Mood normal.         Behavior: Behavior normal.           Significant Labs: All pertinent labs within the past 24 hours have been reviewed.  Recent Lab Results  (Last 5 results in the past 24 hours)        07/25/23  0938   07/25/23  0545   07/25/23  0011   07/24/23  1753   07/24/23  1644        Albumin   3.8             Alkaline Phosphatase   52             ALT   13             Anion Gap   6             Ao root annulus 3.11               Ascending aorta 2.82               Ao peak nyasia 1.12               Ao VTI 20.6               AST   15             AV valve area 3.87               AV mean gradient 3               AV index (prosthetic) 1.01               AV peak gradient 5               AV Velocity Ratio 0.78               Baso #   0.02             Basophil %   0.2              BILIRUBIN TOTAL   0.2  Comment: For infants and newborns, interpretation of results should be based  on gestational age, weight and in agreement with clinical  observations.    Premature Infant recommended reference ranges:  Up to 24 hours.............<8.0 mg/dL  Up to 48 hours............<12.0 mg/dL  3-5 days..................<15.0 mg/dL  6-29 days.................<15.0 mg/dL               BSA 1.89               BUN   7             Calcium   8.4             Chloride   107             Cholesterol   173  Comment: The National Cholesterol Education Program (NCEP) has set the  following guidelines (reference ranges) for Cholesterol:  Optimal.....................<200 mg/dL  Borderline High.............200-239 mg/dL  High........................> or = 240 mg/dL               CO2   25             Creatinine   0.7             Left Ventricle Relative Wall Thickness 0.53               Differential Method   Automated             E/A ratio 1.03               E/E' ratio 8.10               eGFR   >60             EF 50               Eos #   0.2             Eosinophil %   2.0             Estimated Avg Glucose   111             E wave deceleration time 217.15               FS 26               Glucose   89             Gran # (ANC)   4.2             Gran %   52.2             HDL   29  Comment: The National Cholesterol Education Program (NCEP) has set the  following guidelines (reference values) for HDL Cholesterol:  Low...............<40 mg/dL  Optimal...........>60 mg/dL               HDL/Cholesterol Ratio   16.8             Hematocrit   38.4             Hemoglobin   12.0             Hemoglobin A1C External   5.5  Comment: ADA Screening Guidelines:  5.7-6.4%  Consistent with prediabetes  >or=6.5%  Consistent with diabetes    High levels of fetal hemoglobin interfere with the HbA1C  assay. Heterozygous hemoglobin variants (HbS, HgC, etc)do  not significantly interfere with this assay.   However, presence of multiple variants may  affect accuracy.               Immature Grans (Abs)   0.01  Comment: Mild elevation in immature granulocytes is non specific and   can be seen in a variety of conditions including stress response,   acute inflammation, trauma and pregnancy. Correlation with other   laboratory and clinical findings is essential.               Immature Granulocytes   0.1             IVC diameter 1.63               IVRT 87.54               IVSd 1.11               LA WIDTH 4.00               Left Atrium Major Axis 5.07               Left Atrium Minor Axis 4.42               LA size 4.06               LA volume 65.19               LA vol index 36.0               LVOT area 3.8               LDL Cholesterol External   102.0  Comment: The National Cholesterol Education Program (NCEP) has set the  following guidelines (reference values) for LDL Cholesterol:  Optimal.......................<130 mg/dL  Borderline High...............130-159 mg/dL  High..........................160-189 mg/dL  Very High.....................>190 mg/dL               LV LATERAL E/E' RATIO 8.10               LV SEPTAL E/E' RATIO 8.10               LV EDV BP 94.13               LV Diastolic Volume Index 52.01               LVIDd 4.53               LVIDs 3.33               LV mass 189.51               LV Mass Index 105               Left Ventricular Outflow Tract Mean Gradient 1.65               Left Ventricular Outflow Tract Mean Velocity 0.60               LVOT diameter 2.21               LVOT peak chapito 0.87               LVOT stroke volume 79.75               LVOT peak VTI 20.80               LV ESV BP 45.10               LV Systolic Volume Index 24.9               Lymph #   3.2             Lymph %   39.2             Magnesium   2.2             MCH   28.8             MCHC   31.3             MCV   92             Mean e' 0.10               Mono #   0.5             Mono %   6.3             MPV   10.2             MV Peak A Chapito 0.79               MV Peak E Chapito 0.81                Non-HDL Cholesterol   144  Comment: Risk category and Non-HDL cholesterol goals:  Coronary heart disease (CHD)or equivalent (10-year risk of CHD >20%):  Non-HDL cholesterol goal     <130 mg/dL  Two or more CHD risk factors and 10-year risk of CHD <= 20%:  Non-HDL cholesterol goal     <160 mg/dL  0 to 1 CHD risk factor:  Non-HDL cholesterol goal     <190 mg/dL               nRBC   0             Platelets   280             Potassium   3.7             Preg Test, Ur         Negative       PROTEIN TOTAL   6.8             PV mean gradient 0.91               Posterior Wall 1.20               Right Atrial Pressure (from IVC) 8               RA Major Axis 4.24               RA Width 4.00               RBC   4.16             RDW   13.8             RVOT peak chapito 0.62               RVOT peak VTI 18.6               Sodium   138             STJ 2.89               TAPSE 2.00               TDI SEPTAL 0.10               TDI LATERAL 0.10               Total Cholesterol/HDL Ratio   6.0             Triglycerides   210  Comment: The National Cholesterol Education Program (NCEP) has set the  following guidelines (reference values) for triglycerides:  Normal......................<150 mg/dL  Borderline High.............150-199 mg/dL  High........................200-499 mg/dL               Triscuspid Valve Regurgitation Peak Gradient 23               TR Max Chapito 2.40               Troponin I   <0.006  Comment: The reference interval for Troponin I represents the 99th percentile   cutoff   for our facility and is consistent with 3rd generation assay   performance.     <0.006  Comment: The reference interval for Troponin I represents the 99th percentile   cutoff   for our facility and is consistent with 3rd generation assay   performance.     <0.006  Comment: The reference interval for Troponin I represents the 99th percentile   cutoff   for our facility and is consistent with 3rd generation assay   performance.           TV rest  pulmonary artery pressure 31               WBC   8.08                                    Significant Imaging: I have reviewed all pertinent imaging results/findings within the past 24 hours.    X-Ray Chest AP Portable   Final Result      1. The lungs are clear.   2. There is prominence of the width of the right acromioclavicular joint. It measures 11 mm in width.   .         Electronically signed by: Germán Ashley MD   Date:    07/24/2023   Time:    15:14      US Lower Extrem Arteries Bilat with LUIS (xpd)    (Results Pending)

## 2023-07-25 NOTE — HOSPITAL COURSE
7/25/23  NAEON. Patient reports continued chest pain, improved since admit  EKG with inverted T waves inversions noted  Troponin trend WNL  Cardiology following, TTE and MPI pending    7/26/23  GEOVANNI, kaleigh reports improved pain, pointing to lower sternum, epigastric region, described as aching.  She reports some improvement with taking Protonix and Zofran  MPI negative for ischemia    Stable for hospital discharge  Prescribe trial of Protonix on discharge  Advised to f/u with PCP and Cardiology in 1-2 weeks for further management

## 2023-07-25 NOTE — SUBJECTIVE & OBJECTIVE
Past Medical History:   Diagnosis Date    Essential hypertension     Pneumonia        Past Surgical History:   Procedure Laterality Date     SECTION      ROTATOR CUFF REPAIR Right     ROTATOR CUFF REPAIR Right     TUBAL LIGATION         Review of patient's allergies indicates:   Allergen Reactions    Penicillins Hives    Aspirin Other (See Comments)     Stomach Pain ( [facility] )        No current facility-administered medications on file prior to encounter.     Current Outpatient Medications on File Prior to Encounter   Medication Sig    albuterol (PROVENTIL/VENTOLIN HFA) 90 mcg/actuation inhaler Inhale 2 puffs into the lungs every 4 (four) hours as needed for Wheezing.    amLODIPine (NORVASC) 10 MG tablet Take 1 tablet (10 mg total) by mouth once daily.    ibuprofen (ADVIL,MOTRIN) 800 MG tablet Take 1 tablet (800 mg total) by mouth every 8 (eight) hours as needed for Pain.    pantoprazole (PROTONIX) 20 MG tablet Take 1 tablet (20 mg total) by mouth once daily.    sertraline (ZOLOFT) 25 MG tablet TAKE 1 TABLET BY MOUTH EVERY DAY     Family History       Problem Relation (Age of Onset)    Breast cancer Maternal Grandmother          Tobacco Use    Smoking status: Every Day     Packs/day: 2.00     Years: 26.00     Pack years: 52.00     Types: Cigarettes     Start date: 1998    Smokeless tobacco: Never    Tobacco comments:     currently taking chantix    Substance and Sexual Activity    Alcohol use: Not Currently    Drug use: Never    Sexual activity: Yes     Partners: Male     Review of Systems   Constitutional: Negative.  Negative for chills and fever.   HENT: Negative.  Negative for congestion, rhinorrhea, sore throat and trouble swallowing.    Eyes: Negative.  Negative for visual disturbance.   Respiratory: Negative.  Negative for cough, shortness of breath and wheezing.    Cardiovascular:  Positive for chest pain (Intermittent). Negative for palpitations.   Gastrointestinal:  Positive for nausea. Negative  for abdominal pain, diarrhea and vomiting.   Endocrine: Negative.    Genitourinary: Negative.  Negative for dysuria and flank pain.   Musculoskeletal: Negative.  Negative for back pain.   Skin: Negative.  Negative for rash.   Allergic/Immunologic: Negative.    Neurological: Negative.  Negative for speech difficulty, weakness, numbness and headaches.   Hematological: Negative.    Psychiatric/Behavioral: Negative.  Negative for hallucinations. The patient is not nervous/anxious.    All other systems reviewed and are negative.  Objective:     Vital Signs (Most Recent):  Temp: 98 °F (36.7 °C) (07/24/23 1320)  Pulse: 85 (07/24/23 1802)  Resp: 20 (07/24/23 1802)  BP: 122/71 (07/24/23 1802)  SpO2: 96 % (07/24/23 1802) Vital Signs (24h Range):  Temp:  [98 °F (36.7 °C)] 98 °F (36.7 °C)  Pulse:  [82-92] 85  Resp:  [15-24] 20  SpO2:  [96 %-100 %] 96 %  BP: (122-132)/(71-80) 122/71     Weight: 84.7 kg (186 lb 11.7 oz)  Body mass index is 36.47 kg/m².     Physical Exam  Vitals and nursing note reviewed.   Constitutional:       General: She is awake. She is not in acute distress.     Appearance: She is obese. She is not ill-appearing.   HENT:      Head: Normocephalic and atraumatic.      Mouth/Throat:      Mouth: Mucous membranes are moist.   Eyes:      General: No scleral icterus.     Conjunctiva/sclera: Conjunctivae normal.   Cardiovascular:      Rate and Rhythm: Normal rate and regular rhythm.      Heart sounds: No murmur heard.  Pulmonary:      Effort: Pulmonary effort is normal. No respiratory distress.      Breath sounds: Normal breath sounds. No wheezing.   Abdominal:      Palpations: Abdomen is soft.      Tenderness: There is no abdominal tenderness.   Musculoskeletal:         General: No swelling. Normal range of motion.      Cervical back: Normal range of motion and neck supple.   Skin:     General: Skin is warm.      Coloration: Skin is not jaundiced.   Neurological:      General: No focal deficit present.      Mental  Status: She is alert and oriented to person, place, and time. Mental status is at baseline.   Psychiatric:         Attention and Perception: Attention normal.         Mood and Affect: Mood is anxious.         Speech: Speech normal.         Behavior: Behavior normal. Behavior is cooperative.              Significant Labs: All pertinent labs within the past 24 hours have been reviewed.  BMP:   Recent Labs   Lab 07/24/23  1409   GLU 92      K 3.5      CO2 24   BUN 6   CREATININE 0.7   CALCIUM 8.7     CBC:   Recent Labs   Lab 07/24/23  1409   WBC 8.02   HGB 11.9*   HCT 37.1        CMP:   Recent Labs   Lab 07/24/23  1409      K 3.5      CO2 24   GLU 92   BUN 6   CREATININE 0.7   CALCIUM 8.7   PROT 6.8   ALBUMIN 3.8   BILITOT 0.2   ALKPHOS 54*   AST 11   ALT 12   ANIONGAP 8     Troponin:   Recent Labs   Lab 07/24/23  1409 07/24/23  1753   TROPONINI <0.006 <0.006       Significant Imaging: I have reviewed all pertinent imaging results/findings within the past 24 hours.  I have reviewed and interpreted all pertinent imaging results/findings within the past 24 hours.    Imaging Results              X-Ray Chest AP Portable (Final result)  Result time 07/24/23 15:14:42      Final result by YOSELYN Ashley Sr., MD (07/24/23 15:14:42)                   Impression:      1. The lungs are clear.  2. There is prominence of the width of the right acromioclavicular joint. It measures 11 mm in width.  .      Electronically signed by: Germán Ashley MD  Date:    07/24/2023  Time:    15:14               Narrative:    EXAMINATION:  XR CHEST AP PORTABLE    CLINICAL HISTORY:  Chest Pain;    COMPARISON:  05/04/2023    FINDINGS:  The size of the heart is normal. The lungs are clear. There is no pneumothorax.  The costophrenic angles are sharp.  There is prominence of the width of the right acromioclavicular joint.  It measures 11 mm in width.                                    I have independently reviewed and  interpreted the EKG.     I have independently reviewed all pertinent labs within the past 24 hours.    I have independently reviewed, visualized and interpreted all pertinent imaging results within the past 24 hours and discussed the findings with the ED physician, Gayatri Raza, DO

## 2023-07-25 NOTE — H&P
[facility] - Emergency Dept.  [facility] Medicine  History & Physical    Patient Name: Dalia Ozuna  MRN: [id number]  Patient Class: OP- Observation  Admission Date: 2023  Attending Physician: Dick Gregory MD   Primary Care Provider: Franci Arteaga MD         Patient information was obtained from patient, past medical records and ER records.     Subjective:     Principal Problem:Chest pain    Chief Complaint:   Chief Complaint   Patient presents with    Chest Pain     Pt c/o chest pain x 3 weeks also having some nausea and sob        HPI: Ms. Ozuna is a 47-year-old  female with PMH significant for HTN, tobacco use, presented to the ED complaining of few weeks of intermittent chest pain, worsened with exertion, associated with nausea without vomiting, worsening dyspnea on exertion.  No prior known history of CAD.  Never had cardiac interventions in the past.  Reports chest discomfort as burning, radiating to the left shoulder, left arm, intermittent, exacerbated by stress/exertion, relieved with rest.  Never seen Cardiology before.  Currently chest pain-free.  EKG NSR with no ST or T-wave changes.  Initial troponin within normal limits.  Patient intolerant to aspirin, states it burns in her stomach.  ED physician discussed case with Cardiology on-call, recommended placing in observation.      Placed in observation for chest pain, rule out ACS.      Past Medical History:   Diagnosis Date    Essential hypertension     Pneumonia        Past Surgical History:   Procedure Laterality Date     SECTION      ROTATOR CUFF REPAIR Right     ROTATOR CUFF REPAIR Right     TUBAL LIGATION         Review of patient's allergies indicates:   Allergen Reactions    Penicillins Hives    Aspirin Other (See Comments)     Stomach Pain ( [facility] )        No current facility-administered medications on file prior to encounter.     Current Outpatient Medications on File Prior to Encounter   Medication  Sig    albuterol (PROVENTIL/VENTOLIN HFA) 90 mcg/actuation inhaler Inhale 2 puffs into the lungs every 4 (four) hours as needed for Wheezing.    amLODIPine (NORVASC) 10 MG tablet Take 1 tablet (10 mg total) by mouth once daily.    ibuprofen (ADVIL,MOTRIN) 800 MG tablet Take 1 tablet (800 mg total) by mouth every 8 (eight) hours as needed for Pain.    pantoprazole (PROTONIX) 20 MG tablet Take 1 tablet (20 mg total) by mouth once daily.    sertraline (ZOLOFT) 25 MG tablet TAKE 1 TABLET BY MOUTH EVERY DAY     Family History       Problem Relation (Age of Onset)    Breast cancer Maternal Grandmother          Tobacco Use    Smoking status: Every Day     Packs/day: 2.00     Years: 26.00     Pack years: 52.00     Types: Cigarettes     Start date: 5/1/1998    Smokeless tobacco: Never    Tobacco comments:     currently taking chantix    Substance and Sexual Activity    Alcohol use: Not Currently    Drug use: Never    Sexual activity: Yes     Partners: Male     Review of Systems   Constitutional: Negative.  Negative for chills and fever.   HENT: Negative.  Negative for congestion, rhinorrhea, sore throat and trouble swallowing.    Eyes: Negative.  Negative for visual disturbance.   Respiratory: Negative.  Negative for cough, shortness of breath and wheezing.    Cardiovascular:  Positive for chest pain (Intermittent). Negative for palpitations.   Gastrointestinal:  Positive for nausea. Negative for abdominal pain, diarrhea and vomiting.   Endocrine: Negative.    Genitourinary: Negative.  Negative for dysuria and flank pain.   Musculoskeletal: Negative.  Negative for back pain.   Skin: Negative.  Negative for rash.   Allergic/Immunologic: Negative.    Neurological: Negative.  Negative for speech difficulty, weakness, numbness and headaches.   Hematological: Negative.    Psychiatric/Behavioral: Negative.  Negative for hallucinations. The patient is not nervous/anxious.    All other systems reviewed and are  negative.  Objective:     Vital Signs (Most Recent):  Temp: 98 °F (36.7 °C) (07/24/23 1320)  Pulse: 85 (07/24/23 1802)  Resp: 20 (07/24/23 1802)  BP: 122/71 (07/24/23 1802)  SpO2: 96 % (07/24/23 1802) Vital Signs (24h Range):  Temp:  [98 °F (36.7 °C)] 98 °F (36.7 °C)  Pulse:  [82-92] 85  Resp:  [15-24] 20  SpO2:  [96 %-100 %] 96 %  BP: (122-132)/(71-80) 122/71     Weight: 84.7 kg (186 lb 11.7 oz)  Body mass index is 36.47 kg/m².     Physical Exam  Vitals and nursing note reviewed.   Constitutional:       General: She is awake. She is not in acute distress.     Appearance: She is obese. She is not ill-appearing.   HENT:      Head: Normocephalic and atraumatic.      Mouth/Throat:      Mouth: Mucous membranes are moist.   Eyes:      General: No scleral icterus.     Conjunctiva/sclera: Conjunctivae normal.   Cardiovascular:      Rate and Rhythm: Normal rate and regular rhythm.      Heart sounds: No murmur heard.  Pulmonary:      Effort: Pulmonary effort is normal. No respiratory distress.      Breath sounds: Normal breath sounds. No wheezing.   Abdominal:      Palpations: Abdomen is soft.      Tenderness: There is no abdominal tenderness.   Musculoskeletal:         General: No swelling. Normal range of motion.      Cervical back: Normal range of motion and neck supple.   Skin:     General: Skin is warm.      Coloration: Skin is not jaundiced.   Neurological:      General: No focal deficit present.      Mental Status: She is alert and oriented to person, place, and time. Mental status is at baseline.   Psychiatric:         Attention and Perception: Attention normal.         Mood and Affect: Mood is anxious.         Speech: Speech normal.         Behavior: Behavior normal. Behavior is cooperative.              Significant Labs: All pertinent labs within the past 24 hours have been reviewed.  BMP:   Recent Labs   Lab 07/24/23  1409   GLU 92      K 3.5      CO2 24   BUN 6   CREATININE 0.7   CALCIUM 8.7     CBC:    Recent Labs   Lab 07/24/23  1409   WBC 8.02   HGB 11.9*   HCT 37.1        CMP:   Recent Labs   Lab 07/24/23  1409      K 3.5      CO2 24   GLU 92   BUN 6   CREATININE 0.7   CALCIUM 8.7   PROT 6.8   ALBUMIN 3.8   BILITOT 0.2   ALKPHOS 54*   AST 11   ALT 12   ANIONGAP 8     Troponin:   Recent Labs   Lab 07/24/23  1409 07/24/23  1753   TROPONINI <0.006 <0.006       Significant Imaging: I have reviewed all pertinent imaging results/findings within the past 24 hours.  I have reviewed and interpreted all pertinent imaging results/findings within the past 24 hours.    Imaging Results              X-Ray Chest AP Portable (Final result)  Result time 07/24/23 15:14:42      Final result by YOSELYN Ashley Sr., MD (07/24/23 15:14:42)                   Impression:      1. The lungs are clear.  2. There is prominence of the width of the right acromioclavicular joint. It measures 11 mm in width.  .      Electronically signed by: Germán Ashley MD  Date:    07/24/2023  Time:    15:14               Narrative:    EXAMINATION:  XR CHEST AP PORTABLE    CLINICAL HISTORY:  Chest Pain;    COMPARISON:  05/04/2023    FINDINGS:  The size of the heart is normal. The lungs are clear. There is no pneumothorax.  The costophrenic angles are sharp.  There is prominence of the width of the right acromioclavicular joint.  It measures 11 mm in width.                                    I have independently reviewed and interpreted the EKG.     I have independently reviewed all pertinent labs within the past 24 hours.    I have independently reviewed, visualized and interpreted all pertinent imaging results within the past 24 hours and discussed the findings with the ED physician, Gayatri Raza DO          Assessment/Plan:     * Chest pain  -currently chest pain-free.  -trend cardiac enzymes.  -intolerant to aspirin, Plavix 75 p.o. x1, beta-blocker, statin.  -cardiology consult.  -keep NPO after midnight  -Stress Test in AM  per cardiology      Primary hypertension  -continue home dose amlodipine.    -add low-dose metoprolol.      Smoker  -nicotine patch      Anxiety  -Xanax 0.5 mg p.o. b.i.d. as needed        VTE Risk Mitigation (From admission, onward)         Ordered     enoxaparin injection 40 mg  Daily         07/24/23 1956     Place sequential compression device  Until discontinued         07/24/23 1956                   On 07/24/2023, patient should be placed in hospital observation services under my care.        Dick Gregory MD  Department of Hospital Medicine  [facility] - Emergency Dept.

## 2023-07-25 NOTE — HPI
Ms. Ozuna is a 47-year-old  female with PMH significant for HTN, tobacco use, presented to the ED complaining of few weeks of intermittent chest pain, worsened with exertion, associated with nausea without vomiting, worsening dyspnea on exertion.  No prior known history of CAD.  Never had cardiac interventions in the past.  Reports chest discomfort as burning, radiating to the left shoulder, left arm, intermittent, exacerbated by stress/exertion, relieved with rest.  Never seen Cardiology before.  Currently chest pain-free.  EKG NSR with no ST or T-wave changes.  Initial troponin within normal limits.  Patient intolerant to aspirin, states it burns in her stomach.  ED physician discussed case with Cardiology on-call, recommended placing in observation.      Placed in observation for chest pain, rule out ACS.

## 2023-07-25 NOTE — PROGRESS NOTES
Attending Attestation (For Attendings USE Only)... Patient is ready to quit smoking and request a smoking cessation appointment.

## 2023-07-25 NOTE — SUBJECTIVE & OBJECTIVE
Past Medical History:   Diagnosis Date    Essential hypertension     Pneumonia        Past Surgical History:   Procedure Laterality Date     SECTION      ROTATOR CUFF REPAIR Right     ROTATOR CUFF REPAIR Right     TUBAL LIGATION         Review of patient's allergies indicates:   Allergen Reactions    Penicillins Hives    Aspirin Other (See Comments)     Stomach Pain ( [facility] )        No current facility-administered medications on file prior to encounter.     Current Outpatient Medications on File Prior to Encounter   Medication Sig    albuterol (PROVENTIL/VENTOLIN HFA) 90 mcg/actuation inhaler Inhale 2 puffs into the lungs every 4 (four) hours as needed for Wheezing.    amLODIPine (NORVASC) 10 MG tablet Take 1 tablet (10 mg total) by mouth once daily.    ibuprofen (ADVIL,MOTRIN) 800 MG tablet Take 1 tablet (800 mg total) by mouth every 8 (eight) hours as needed for Pain.    pantoprazole (PROTONIX) 20 MG tablet Take 1 tablet (20 mg total) by mouth once daily.    sertraline (ZOLOFT) 25 MG tablet TAKE 1 TABLET BY MOUTH EVERY DAY    tiZANidine (ZANAFLEX) 4 MG tablet Take 4 mg by mouth every 8 (eight) hours.     Family History       Problem Relation (Age of Onset)    Breast cancer Maternal Grandmother          Tobacco Use    Smoking status: Every Day     Packs/day: 2.00     Years: 26.00     Pack years: 52.00     Types: Cigarettes     Start date: 1998    Smokeless tobacco: Never    Tobacco comments:     currently taking chantix    Substance and Sexual Activity    Alcohol use: Not Currently    Drug use: Never    Sexual activity: Yes     Partners: Male     Review of Systems   Constitutional: Negative.   HENT: Negative.     Eyes: Negative.    Cardiovascular:  Positive for chest pain and claudication.   Respiratory: Negative.     Endocrine: Negative.    Hematologic/Lymphatic: Negative.    Skin: Negative.    Musculoskeletal: Negative.         Left arm and leg pain   Gastrointestinal: Negative.    Genitourinary:  Negative.    Neurological: Negative.    Psychiatric/Behavioral: Negative.     Allergic/Immunologic: Negative.    Objective:     Vital Signs (Most Recent):  Temp: 98.3 °F (36.8 °C) (07/25/23 0827)  Pulse: 70 (07/25/23 0918)  Resp: 16 (07/25/23 0827)  BP: (!) 110/59 (07/25/23 0834)  SpO2: 97 % (07/25/23 0827) Vital Signs (24h Range):  Temp:  [98 °F (36.7 °C)-98.7 °F (37.1 °C)] 98.3 °F (36.8 °C)  Pulse:  [70-92] 70  Resp:  [15-24] 16  SpO2:  [96 %-100 %] 97 %  BP: (102-132)/(58-80) 110/59     Weight: 84.4 kg (186 lb)  Body mass index is 36.33 kg/m².    SpO2: 97 %       No intake or output data in the 24 hours ending 07/25/23 1014    Lines/Drains/Airways       Peripheral Intravenous Line  Duration                  Peripheral IV - Single Lumen 07/24/23 1409 20 G Right Antecubital <1 day                     Physical Exam  Vitals and nursing note reviewed.   Constitutional:       General: She is not in acute distress.     Appearance: Normal appearance. She is well-developed. She is obese. She is not diaphoretic.   HENT:      Head: Normocephalic and atraumatic.   Eyes:      General:         Right eye: No discharge.         Left eye: No discharge.      Pupils: Pupils are equal, round, and reactive to light.   Neck:      Thyroid: No thyromegaly.      Vascular: No JVD.      Trachea: No tracheal deviation.   Cardiovascular:      Rate and Rhythm: Normal rate and regular rhythm.      Heart sounds: Normal heart sounds, S1 normal and S2 normal. No murmur heard.     Comments: +TTP chest wall  Pulmonary:      Effort: Pulmonary effort is normal. No respiratory distress.      Breath sounds: Normal breath sounds. No wheezing or rales.   Abdominal:      General: There is no distension.      Tenderness: There is no rebound.   Musculoskeletal:      Cervical back: Neck supple.      Right lower leg: No edema.      Left lower leg: No edema.   Skin:     General: Skin is warm and dry.      Findings: No erythema.   Neurological:      General: No  focal deficit present.      Mental Status: She is alert and oriented to person, place, and time.   Psychiatric:         Mood and Affect: Mood normal.         Behavior: Behavior normal.         Thought Content: Thought content normal.        Significant Labs: CMP   Recent Labs   Lab 07/24/23  1409 07/25/23  0545    138   K 3.5 3.7    107   CO2 24 25   GLU 92 89   BUN 6 7   CREATININE 0.7 0.7   CALCIUM 8.7 8.4*   PROT 6.8 6.8   ALBUMIN 3.8 3.8   BILITOT 0.2 0.2   ALKPHOS 54* 52*   AST 11 15   ALT 12 13   ANIONGAP 8 6*   , CBC   Recent Labs   Lab 07/24/23  1409 07/25/23  0545   WBC 8.02 8.08   HGB 11.9* 12.0   HCT 37.1 38.4    280   , INR No results for input(s): INR, PROTIME in the last 48 hours., Troponin   Recent Labs   Lab 07/24/23  1753 07/25/23  0011 07/25/23  0545   TROPONINI <0.006 <0.006 <0.006   , and All pertinent lab results from the last 24 hours have been reviewed.    Significant Imaging: Echocardiogram: Transthoracic echo (TTE) complete ([facility] Only):   Results for orders placed or performed during the hospital encounter of 07/24/23   Echo   Result Value Ref Range    BSA 1.89 m2    TDI SEPTAL 0.10 m/s    LV LATERAL E/E' RATIO 8.10 m/s    LV SEPTAL E/E' RATIO 8.10 m/s    LA WIDTH 4.00 cm    IVC diameter 1.63 cm    Left Ventricular Outflow Tract Mean Velocity 0.60 cm/s    Left Ventricular Outflow Tract Mean Gradient 1.65 mmHg    TDI LATERAL 0.10 m/s    LVIDd 4.53 3.5 - 6.0 cm    IVS 1.11 (A) 0.6 - 1.1 cm    Posterior Wall 1.20 (A) 0.6 - 1.1 cm    Ao root annulus 3.11 cm    LVIDs 3.33 2.1 - 4.0 cm    FS 26 28 - 44 %    LA volume 65.19 cm3    STJ 2.89 cm    Ascending aorta 2.82 cm    LV mass 189.51 g    LA size 4.06 cm    TAPSE 2.00 cm    Left Ventricle Relative Wall Thickness 0.53 cm    AV mean gradient 3 mmHg    AV valve area 3.87 cm2    AV Velocity Ratio 0.78     AV index (prosthetic) 1.01     PV peak gradient 1.54 mmHg    E/A ratio 1.03     Mean e' 0.10 m/s    E wave deceleration time  217.15 msec    IVRT 87.54 msec    LVOT diameter 2.21 cm    LVOT area 3.8 cm2    LVOT peak chapito 0.87 m/s    LVOT peak VTI 20.80 cm    Ao peak chapito 1.12 m/s    Ao VTI 20.6 cm    RVOT peak chapito 0.62 m/s    RVOT peak VTI 18.6 cm    LVOT stroke volume 79.75 cm3    AV peak gradient 5 mmHg    PV mean gradient 0.91 mmHg    E/E' ratio 8.10 m/s    MV Peak E Chapito 0.81 m/s    TR Max Chapito 2.40 m/s    MV Peak A Chapito 0.79 m/s    LV Systolic Volume 45.10 mL    LV Systolic Volume Index 24.9 mL/m2    LV Diastolic Volume 94.13 mL    LV Diastolic Volume Index 52.01 mL/m2    LA Volume Index 36.0 mL/m2    LV Mass Index 105 g/m2    RA Major Axis 4.24 cm    Left Atrium Minor Axis 4.42 cm    Left Atrium Major Axis 5.07 cm    Triscuspid Valve Regurgitation Peak Gradient 23 mmHg    RA Width 4.00 cm   , EKG: Reviewed, and X-Ray: CXR: X-Ray Chest 1 View (CXR): No results found for this visit on 07/24/23. and X-Ray Chest PA and Lateral (CXR): No results found for this visit on 07/24/23.

## 2023-07-25 NOTE — ASSESSMENT & PLAN NOTE
-currently chest pain-free.  -trend cardiac enzymes.  -intolerant to aspirin, Plavix 75 p.o. x1, beta-blocker, statin.  -cardiology consult.  -keep NPO after midnight  -Stress Test in AM per cardiology

## 2023-07-25 NOTE — ASSESSMENT & PLAN NOTE
-Presents with CP symptoms with typical and atypical features  -Serial troponin negative  -EKG reviewed, some T wave inversions noted  -Continue ASA, BB, CCB  -Check echo and MPI stress test

## 2023-07-25 NOTE — PLAN OF CARE
[facility] (Hospital)  Initial Discharge Assessment       Primary Care Provider: Franci Arteaga MD    Admission Diagnosis: Chest pain [R07.9]    Admission Date: 7/24/2023  Expected Discharge Date:     Transition of Care Barriers: None    Payor: [employer] / Plan: [facility] CHOICE PLUS / Product Type: Commercial /     Extended Emergency Contact Information  Primary Emergency Contact: osvaldo maria  Mobile Phone: [phone]  Relation: Sister  Preferred language: English   needed? No    Discharge Plan A: Home with family         [id number] [place] - [address] [facility]  [address]  [place] [address]  Phone: [phone] Fax: [phone]      Initial Assessment (most recent)       Adult Discharge Assessment - 07/25/23 0936          Discharge Assessment    Assessment Type Discharge Planning Assessment     Confirmed/corrected address, phone number and insurance Yes     Confirmed Demographics Correct on Facesheet     Source of Information patient     Communicated KEITH with patient/caregiver Date not available/Unable to determine     Reason For Admission Chest pain     People in Home child(paul), dependent;significant other     Facility Arrived From: home     Do you expect to return to your current living situation? Yes     Do you have help at home or someone to help you manage your care at home? Yes     Who are your caregiver(s) and their phone number(s)? fiancee - 47 years old and daughter - 16 years old     Prior to hospitilization cognitive status: Alert/Oriented     Current cognitive status: Alert/Oriented     Walking or Climbing Stairs --   independent    Dressing/Bathing --   independent    Home Accessibility stairs to enter home     Number of Stairs, Main Entrance three     Surface of Stairs, Main Entrance concrete     Stair Railings, Main Entrance railings on both sides of stairs     Landing, Stairs, Main Entrance inadequate turning radius     Home Layout  Able to live on 1st floor     Equipment Currently Used at Home none     Readmission within 30 days? No     Patient currently being followed by outpatient case management? No     Do you currently have service(s) that help you manage your care at home? No     Do you take prescription medications? Yes     Do you have prescription coverage? Yes     Coverage [employer] [place]     Do you have any problems affording any of your prescribed medications? No     Is the patient taking medications as prescribed? yes     Who is going to help you get home at discharge? fiancee - 47 years old and daughter - 16 years old     How do you get to doctors appointments? car, drives self     Are you on dialysis? No     Do you take coumadin? No     Discharge Plan A Home with family     DME Needed Upon Discharge  none     Discharge Plan discussed with: Patient     Transition of Care Barriers None                   Anticipated DC dispo: home with family  Prior Level of Function: independent with ADLs  People in home: joaquinancee - 47 years old and daughter - 16 years old    Comments:  SW met with patient at bedside to introduce role and discuss discharge planning. Patient's fiancee and daughter will be help at home and can provide transport at time of discharge. CM discharge needs depends on hospital progress. SW will continue following to assist with other needs.

## 2023-07-25 NOTE — PHARMACY MED REC
"Admission Medication History     The home medication history was taken by Rah Pal.    You may go to "Admission" then "Reconcile Home Medications" tabs to review and/or act upon these items.     The home medication list has been updated by the Pharmacy department.   Please read ALL comments highlighted in yellow.   Please address this information as you see fit.    Feel free to contact us if you have any questions or require assistance.      Medications listed below were obtained from: Patient/family and Analytic software- [employer]  (Not in a hospital admission)      Rah Pal  [id number]    Current Outpatient Medications on File Prior to Encounter   Medication Sig Dispense Refill Last Dose    albuterol (PROVENTIL/VENTOLIN HFA) 90 mcg/actuation inhaler Inhale 2 puffs into the lungs every 4 (four) hours as needed for Wheezing. 18 g 3 7/24/2023    amLODIPine (NORVASC) 10 MG tablet Take 1 tablet (10 mg total) by mouth once daily. 30 tablet 3 7/24/2023    ibuprofen (ADVIL,MOTRIN) 800 MG tablet Take 1 tablet (800 mg total) by mouth every 8 (eight) hours as needed for Pain. 30 tablet 0 7/24/2023    pantoprazole (PROTONIX) 20 MG tablet Take 1 tablet (20 mg total) by mouth once daily. 30 tablet 3 7/24/2023    sertraline (ZOLOFT) 25 MG tablet TAKE 1 TABLET BY MOUTH EVERY DAY 30 tablet 0 7/24/2023    tiZANidine (ZANAFLEX) 4 MG tablet Take 4 mg by mouth every 8 (eight) hours.   7/24/2023                           .        "

## 2023-07-25 NOTE — ASSESSMENT & PLAN NOTE
-currently chest pain-free.  -trend cardiac enzymes.  -intolerant to aspirin, Plavix 75 p.o. x1, beta-blocker, statin.  -cardiology consult.  -keep NPO after midnight    7/25  -Cardiology following  -TTE and MPI pending

## 2023-07-25 NOTE — HPI
Ms. Ozuna is a 47 year old female patient whose current medical conditions include HTN, tobacco abuse, and obesity who presented to [facility] ED yesterday with a chief complaint of substernal burning chest pain over the past two weeks. Patient reported the pain worsened with exertion and sometimes radiated to her left arm and left leg. Improved with rest. She denied any associated nausea, vomiting, diaphoresis, palpitations, near syncope, or syncope. Initial workup in ED un-revealing. Cardiology consulted to assist with management. Patient seen and examined today, sitting up in bed. Feels ok. Still having chest pain symptoms, has element of tenderness upon palpation and will occasionally have pain with breathing. Denies any prior CV history. She reports compliance with her medications. Active smoker. Serial troponin negative. EKG reviewed, T wave inversions noted. Echo and MPI stress test pending.

## 2023-07-25 NOTE — CONSULTS
[facility] ([facility])  Cardiology  Consult Note    Patient Name: Dalia Ozuna  MRN: [id number]  Admission Date: 2023  Hospital Length of Stay: 0 days  Code Status: Full Code   Attending Provider: Dylon Mosley MD   Consulting Provider: Ibeth Miller PA-C  Primary Care Physician: Franci Arteaga MD  Principal Problem:Chest pain    Patient information was obtained from patient, past medical records and ER records.     Inpatient consult to Cardiology  Consult performed by: Ibeth Miller PA-C  Consult ordered by: Dick Gregory MD        Subjective:     Chief Complaint: Chest pain  HPI:   Ms. Ozuna is a 47 year old female patient whose current medical conditions include HTN, tobacco abuse, and obesity who presented to [facility] ED yesterday with a chief complaint of substernal burning chest pain over the past two weeks. Patient reported the pain worsened with exertion and sometimes radiated to her left arm and left leg. Improved with rest. She denied any associated nausea, vomiting, diaphoresis, palpitations, near syncope, or syncope. Initial workup in ED un-revealing. Cardiology consulted to assist with management. Patient seen and examined today, sitting up in bed. Feels ok. Still having chest pain symptoms, has element of tenderness upon palpation and will occasionally have pain with breathing. Denies any prior CV history. She reports compliance with her medications. Active smoker. Serial troponin negative. EKG reviewed, T wave inversions noted. Echo and MPI stress test pending.        Past Medical History:   Diagnosis Date    Essential hypertension     Pneumonia        Past Surgical History:   Procedure Laterality Date     SECTION      ROTATOR CUFF REPAIR Right     ROTATOR CUFF REPAIR Right     TUBAL LIGATION         Review of patient's allergies indicates:   Allergen Reactions    Penicillins Hives    Aspirin Other (See Comments)     Stomach Pain ( Sharp )        No current  facility-administered medications on file prior to encounter.     Current Outpatient Medications on File Prior to Encounter   Medication Sig    albuterol (PROVENTIL/VENTOLIN HFA) 90 mcg/actuation inhaler Inhale 2 puffs into the lungs every 4 (four) hours as needed for Wheezing.    amLODIPine (NORVASC) 10 MG tablet Take 1 tablet (10 mg total) by mouth once daily.    ibuprofen (ADVIL,MOTRIN) 800 MG tablet Take 1 tablet (800 mg total) by mouth every 8 (eight) hours as needed for Pain.    pantoprazole (PROTONIX) 20 MG tablet Take 1 tablet (20 mg total) by mouth once daily.    sertraline (ZOLOFT) 25 MG tablet TAKE 1 TABLET BY MOUTH EVERY DAY    tiZANidine (ZANAFLEX) 4 MG tablet Take 4 mg by mouth every 8 (eight) hours.     Family History       Problem Relation (Age of Onset)    Breast cancer Maternal Grandmother          Tobacco Use    Smoking status: Every Day     Packs/day: 2.00     Years: 26.00     Pack years: 52.00     Types: Cigarettes     Start date: 5/1/1998    Smokeless tobacco: Never    Tobacco comments:     currently taking chantix    Substance and Sexual Activity    Alcohol use: Not Currently    Drug use: Never    Sexual activity: Yes     Partners: Male     Review of Systems   Constitutional: Negative.   HENT: Negative.     Eyes: Negative.    Cardiovascular:  Positive for chest pain and claudication.   Respiratory: Negative.     Endocrine: Negative.    Hematologic/Lymphatic: Negative.    Skin: Negative.    Musculoskeletal: Negative.         Left arm and leg pain   Gastrointestinal: Negative.    Genitourinary: Negative.    Neurological: Negative.    Psychiatric/Behavioral: Negative.     Allergic/Immunologic: Negative.    Objective:     Vital Signs (Most Recent):  Temp: 98.3 °F (36.8 °C) (07/25/23 0827)  Pulse: 70 (07/25/23 0918)  Resp: 16 (07/25/23 0827)  BP: (!) 110/59 (07/25/23 0834)  SpO2: 97 % (07/25/23 0827) Vital Signs (24h Range):  Temp:  [98 °F (36.7 °C)-98.7 °F (37.1 °C)] 98.3 °F (36.8  °C)  Pulse:  [70-92] 70  Resp:  [15-24] 16  SpO2:  [96 %-100 %] 97 %  BP: (102-132)/(58-80) 110/59     Weight: 84.4 kg (186 lb)  Body mass index is 36.33 kg/m².    SpO2: 97 %       No intake or output data in the 24 hours ending 07/25/23 1014    Lines/Drains/Airways       Peripheral Intravenous Line  Duration                  Peripheral IV - Single Lumen 07/24/23 1409 20 G Right Antecubital <1 day                     Physical Exam  Vitals and nursing note reviewed.   Constitutional:       General: She is not in acute distress.     Appearance: Normal appearance. She is well-developed. She is obese. She is not diaphoretic.   HENT:      Head: Normocephalic and atraumatic.   Eyes:      General:         Right eye: No discharge.         Left eye: No discharge.      Pupils: Pupils are equal, round, and reactive to light.   Neck:      Thyroid: No thyromegaly.      Vascular: No JVD.      Trachea: No tracheal deviation.   Cardiovascular:      Rate and Rhythm: Normal rate and regular rhythm.      Heart sounds: Normal heart sounds, S1 normal and S2 normal. No murmur heard.     Comments: +TTP chest wall  Pulmonary:      Effort: Pulmonary effort is normal. No respiratory distress.      Breath sounds: Normal breath sounds. No wheezing or rales.   Abdominal:      General: There is no distension.      Tenderness: There is no rebound.   Musculoskeletal:      Cervical back: Neck supple.      Right lower leg: No edema.      Left lower leg: No edema.   Skin:     General: Skin is warm and dry.      Findings: No erythema.   Neurological:      General: No focal deficit present.      Mental Status: She is alert and oriented to person, place, and time.   Psychiatric:         Mood and Affect: Mood normal.         Behavior: Behavior normal.         Thought Content: Thought content normal.        Significant Labs: CMP   Recent Labs   Lab 07/24/23  1409 07/25/23  0545    138   K 3.5 3.7    107   CO2 24 25   GLU 92 89   BUN 6 7    CREATININE 0.7 0.7   CALCIUM 8.7 8.4*   PROT 6.8 6.8   ALBUMIN 3.8 3.8   BILITOT 0.2 0.2   ALKPHOS 54* 52*   AST 11 15   ALT 12 13   ANIONGAP 8 6*   , CBC   Recent Labs   Lab 07/24/23  1409 07/25/23  0545   WBC 8.02 8.08   HGB 11.9* 12.0   HCT 37.1 38.4    280   , INR No results for input(s): INR, PROTIME in the last 48 hours., Troponin   Recent Labs   Lab 07/24/23  1753 07/25/23  0011 07/25/23  0545   TROPONINI <0.006 <0.006 <0.006   , and All pertinent lab results from the last 24 hours have been reviewed.    Significant Imaging: Echocardiogram: Transthoracic echo (TTE) complete (Cupid Only):   Results for orders placed or performed during the hospital encounter of 07/24/23   Echo   Result Value Ref Range    BSA 1.89 m2    TDI SEPTAL 0.10 m/s    LV LATERAL E/E' RATIO 8.10 m/s    LV SEPTAL E/E' RATIO 8.10 m/s    LA WIDTH 4.00 cm    IVC diameter 1.63 cm    Left Ventricular Outflow Tract Mean Velocity 0.60 cm/s    Left Ventricular Outflow Tract Mean Gradient 1.65 mmHg    TDI LATERAL 0.10 m/s    LVIDd 4.53 3.5 - 6.0 cm    IVS 1.11 (A) 0.6 - 1.1 cm    Posterior Wall 1.20 (A) 0.6 - 1.1 cm    Ao root annulus 3.11 cm    LVIDs 3.33 2.1 - 4.0 cm    FS 26 28 - 44 %    LA volume 65.19 cm3    STJ 2.89 cm    Ascending aorta 2.82 cm    LV mass 189.51 g    LA size 4.06 cm    TAPSE 2.00 cm    Left Ventricle Relative Wall Thickness 0.53 cm    AV mean gradient 3 mmHg    AV valve area 3.87 cm2    AV Velocity Ratio 0.78     AV index (prosthetic) 1.01     PV peak gradient 1.54 mmHg    E/A ratio 1.03     Mean e' 0.10 m/s    E wave deceleration time 217.15 msec    IVRT 87.54 msec    LVOT diameter 2.21 cm    LVOT area 3.8 cm2    LVOT peak chapito 0.87 m/s    LVOT peak VTI 20.80 cm    Ao peak chapito 1.12 m/s    Ao VTI 20.6 cm    RVOT peak chapito 0.62 m/s    RVOT peak VTI 18.6 cm    LVOT stroke volume 79.75 cm3    AV peak gradient 5 mmHg    PV mean gradient 0.91 mmHg    E/E' ratio 8.10 m/s    MV Peak E Chapito 0.81 m/s    TR Max Chapito 2.40 m/s     MV Peak A Chapito 0.79 m/s    LV Systolic Volume 45.10 mL    LV Systolic Volume Index 24.9 mL/m2    LV Diastolic Volume 94.13 mL    LV Diastolic Volume Index 52.01 mL/m2    LA Volume Index 36.0 mL/m2    LV Mass Index 105 g/m2    RA Major Axis 4.24 cm    Left Atrium Minor Axis 4.42 cm    Left Atrium Major Axis 5.07 cm    Triscuspid Valve Regurgitation Peak Gradient 23 mmHg    RA Width 4.00 cm   , EKG: Reviewed, and X-Ray: CXR: X-Ray Chest 1 View (CXR): No results found for this visit on 07/24/23. and X-Ray Chest PA and Lateral (CXR): No results found for this visit on 07/24/23.    Assessment and Plan:   Patient who presents with CP with typical and atypical features. CV wise, stable thus far. Check echo and MPI stress test. LUIS/BLE arterial U/S given claudication symptoms.     * Chest pain  -Presents with CP symptoms with typical and atypical features  -Serial troponin negative  -EKG reviewed, some T wave inversions noted  -Continue ASA, BB, CCB  -Check echo and MPI stress test    Claudication  -Endorses cramping pains with walking  -Check LUIS/BLE arterial U/S    Primary hypertension  -Continue CCB, BB  -Monitor    Smoker  -Cessation advised        VTE Risk Mitigation (From admission, onward)         Ordered     enoxaparin injection 40 mg  Daily         07/24/23 1956     Place sequential compression device  Until discontinued         07/24/23 1956                Thank you for your consult. I will follow-up with patient. Please contact us if you have any additional questions.    Ibeth Miller PA-C  Cardiology   [facility] - Tele[facility] ([facility])

## 2023-07-26 ENCOUNTER — TELEPHONE (OUTPATIENT)
Dept: CARDIOLOGY | Facility: HOSPITAL | Age: 48
End: 2023-07-26
Payer: COMMERCIAL

## 2023-07-26 VITALS
BODY MASS INDEX: 36.52 KG/M2 | DIASTOLIC BLOOD PRESSURE: 61 MMHG | HEIGHT: 60 IN | RESPIRATION RATE: 16 BRPM | TEMPERATURE: 98 F | WEIGHT: 186 LBS | HEART RATE: 72 BPM | OXYGEN SATURATION: 98 % | SYSTOLIC BLOOD PRESSURE: 103 MMHG

## 2023-07-26 PROBLEM — I73.9 CLAUDICATION: Status: RESOLVED | Noted: 2023-07-25 | Resolved: 2023-07-26

## 2023-07-26 PROBLEM — K21.9 GERD (GASTROESOPHAGEAL REFLUX DISEASE): Chronic | Status: ACTIVE | Noted: 2023-07-26

## 2023-07-26 PROBLEM — R07.89 ATYPICAL CHEST PAIN: Status: ACTIVE | Noted: 2023-07-24

## 2023-07-26 PROBLEM — R07.9 CHEST PAIN: Status: RESOLVED | Noted: 2023-07-24 | Resolved: 2023-07-26

## 2023-07-26 PROCEDURE — S4991 NICOTINE PATCH NONLEGEND: HCPCS | Performed by: INTERNAL MEDICINE

## 2023-07-26 PROCEDURE — 25000003 PHARM REV CODE 250: Performed by: HOSPITALIST

## 2023-07-26 PROCEDURE — G0378 HOSPITAL OBSERVATION PER HR: HCPCS

## 2023-07-26 PROCEDURE — 25000003 PHARM REV CODE 250: Performed by: INTERNAL MEDICINE

## 2023-07-26 PROCEDURE — 99203 PR OFFICE/OUTPT VISIT, NEW, LEVL III, 30-44 MIN: ICD-10-PCS | Mod: ,,,

## 2023-07-26 PROCEDURE — 99203 OFFICE O/P NEW LOW 30 MIN: CPT | Mod: ,,,

## 2023-07-26 RX ORDER — PANTOPRAZOLE SODIUM 40 MG/1
40 TABLET, DELAYED RELEASE ORAL DAILY
Qty: 30 TABLET | Refills: 2 | Status: SHIPPED | OUTPATIENT
Start: 2023-07-27 | End: 2024-07-26

## 2023-07-26 RX ORDER — METOPROLOL TARTRATE 25 MG/1
25 TABLET, FILM COATED ORAL 2 TIMES DAILY
Qty: 60 TABLET | Refills: 2 | Status: SHIPPED | OUTPATIENT
Start: 2023-07-26 | End: 2023-11-06 | Stop reason: SDUPTHER

## 2023-07-26 RX ORDER — IBUPROFEN 200 MG
1 TABLET ORAL DAILY
Qty: 28 PATCH | Refills: 1 | Status: SHIPPED | OUTPATIENT
Start: 2023-07-27

## 2023-07-26 RX ORDER — ATORVASTATIN CALCIUM 40 MG/1
40 TABLET, FILM COATED ORAL NIGHTLY
Qty: 90 TABLET | Refills: 1 | Status: SHIPPED | OUTPATIENT
Start: 2023-07-26 | End: 2024-07-25

## 2023-07-26 RX ORDER — ONDANSETRON 4 MG/1
4 TABLET, ORALLY DISINTEGRATING ORAL EVERY 6 HOURS PRN
Qty: 20 TABLET | Refills: 0 | Status: SHIPPED | OUTPATIENT
Start: 2023-07-26

## 2023-07-26 RX ORDER — TRAMADOL HYDROCHLORIDE 50 MG/1
50 TABLET ORAL EVERY 8 HOURS PRN
Qty: 10 TABLET | Refills: 0 | Status: SHIPPED | OUTPATIENT
Start: 2023-07-26 | End: 2023-11-07

## 2023-07-26 RX ADMIN — TRAMADOL HYDROCHLORIDE 50 MG: 50 TABLET, COATED ORAL at 08:07

## 2023-07-26 RX ADMIN — METOPROLOL TARTRATE 25 MG: 25 TABLET, FILM COATED ORAL at 08:07

## 2023-07-26 RX ADMIN — ACETAMINOPHEN 650 MG: 325 TABLET ORAL at 06:07

## 2023-07-26 RX ADMIN — AMLODIPINE BESYLATE 10 MG: 10 TABLET ORAL at 08:07

## 2023-07-26 RX ADMIN — ALPRAZOLAM 0.5 MG: 0.5 TABLET ORAL at 08:07

## 2023-07-26 RX ADMIN — NICOTINE 1 PATCH: 21 PATCH, EXTENDED RELEASE TRANSDERMAL at 08:07

## 2023-07-26 RX ADMIN — PANTOPRAZOLE SODIUM 40 MG: 40 TABLET, DELAYED RELEASE ORAL at 08:07

## 2023-07-26 NOTE — SUBJECTIVE & OBJECTIVE
Review of Systems   Constitutional: Positive for malaise/fatigue.   HENT: Negative.     Eyes: Negative.    Cardiovascular:  Positive for chest pain.   Respiratory: Negative.     Skin: Negative.    Musculoskeletal: Negative.    Gastrointestinal:  Positive for abdominal pain and nausea.   Genitourinary: Negative.    Neurological: Negative.    Psychiatric/Behavioral: Negative.     Objective:     Vital Signs (Most Recent):  Temp: 98.3 °F (36.8 °C) (07/26/23 0728)  Pulse: 72 (07/26/23 0914)  Resp: 16 (07/26/23 0842)  BP: 103/61 (07/26/23 0728)  SpO2: 98 % (07/26/23 0728) Vital Signs (24h Range):  Temp:  [97.8 °F (36.6 °C)-99 °F (37.2 °C)] 98.3 °F (36.8 °C)  Pulse:  [71-79] 72  Resp:  [15-18] 16  SpO2:  [98 %-100 %] 98 %  BP: (103-123)/(60-82) 103/61     Weight: 84.4 kg (186 lb)  Body mass index is 36.33 kg/m².     SpO2: 98 %       No intake or output data in the 24 hours ending 07/26/23 1115    Lines/Drains/Airways       Peripheral Intravenous Line  Duration                  Peripheral IV - Single Lumen 07/24/23 1409 20 G Right Antecubital 1 day                       Physical Exam  Vitals and nursing note reviewed.   Constitutional:       Appearance: Normal appearance.   HENT:      Head: Normocephalic.   Eyes:      Pupils: Pupils are equal, round, and reactive to light.   Cardiovascular:      Rate and Rhythm: Normal rate and regular rhythm.      Heart sounds: Normal heart sounds, S1 normal and S2 normal. No murmur heard.    No S3 or S4 sounds.   Pulmonary:      Effort: Pulmonary effort is normal.      Breath sounds: Normal breath sounds.   Abdominal:      General: Bowel sounds are normal.      Palpations: Abdomen is soft.   Musculoskeletal:         General: Normal range of motion.      Cervical back: Normal range of motion.   Skin:     Capillary Refill: Capillary refill takes less than 2 seconds.   Neurological:      General: No focal deficit present.      Mental Status: She is alert and oriented to person, place,  and time.   Psychiatric:         Mood and Affect: Mood normal.         Behavior: Behavior normal.         Thought Content: Thought content normal.          Significant Labs: BMP:   Recent Labs   Lab 07/24/23  1409 07/25/23  0545   GLU 92 89    138   K 3.5 3.7    107   CO2 24 25   BUN 6 7   CREATININE 0.7 0.7   CALCIUM 8.7 8.4*   MG  --  2.2   , CMP   Recent Labs   Lab 07/24/23  1409 07/25/23  0545    138   K 3.5 3.7    107   CO2 24 25   GLU 92 89   BUN 6 7   CREATININE 0.7 0.7   CALCIUM 8.7 8.4*   PROT 6.8 6.8   ALBUMIN 3.8 3.8   BILITOT 0.2 0.2   ALKPHOS 54* 52*   AST 11 15   ALT 12 13   ANIONGAP 8 6*   , CBC   Recent Labs   Lab 07/24/23  1409 07/25/23  0545   WBC 8.02 8.08   HGB 11.9* 12.0   HCT 37.1 38.4    280   , INR No results for input(s): INR, PROTIME in the last 48 hours., Lipid Panel   Recent Labs   Lab 07/25/23  0545   CHOL 173   HDL 29*   LDLCALC 102.0   TRIG 210*   CHOLHDL 16.8*   , Troponin   Recent Labs   Lab 07/25/23  0011 07/25/23  0545 07/25/23  1809   TROPONINI <0.006 <0.006 <0.006   , and All pertinent lab results from the last 24 hours have been reviewed.    Significant Imaging: Echocardiogram: Transthoracic echo (TTE) complete (Cupid Only):   Results for orders placed or performed during the hospital encounter of 07/24/23   Echo   Result Value Ref Range    BSA 1.89 m2    TDI SEPTAL 0.10 m/s    LV LATERAL E/E' RATIO 8.10 m/s    LV SEPTAL E/E' RATIO 8.10 m/s    LA WIDTH 4.00 cm    IVC diameter 1.63 cm    Left Ventricular Outflow Tract Mean Velocity 0.60 cm/s    Left Ventricular Outflow Tract Mean Gradient 1.65 mmHg    TDI LATERAL 0.10 m/s    LVIDd 4.53 3.5 - 6.0 cm    IVS 1.11 (A) 0.6 - 1.1 cm    Posterior Wall 1.20 (A) 0.6 - 1.1 cm    Ao root annulus 3.11 cm    LVIDs 3.33 2.1 - 4.0 cm    FS 26 28 - 44 %    LA volume 65.19 cm3    STJ 2.89 cm    Ascending aorta 2.82 cm    LV mass 189.51 g    LA size 4.06 cm    TAPSE 2.00 cm    Left Ventricle Relative Wall Thickness  0.53 cm    AV mean gradient 3 mmHg    AV valve area 3.87 cm2    AV Velocity Ratio 0.78     AV index (prosthetic) 1.01     E/A ratio 1.03     Mean e' 0.10 m/s    E wave deceleration time 217.15 msec    IVRT 87.54 msec    LVOT diameter 2.21 cm    LVOT area 3.8 cm2    LVOT peak chapito 0.87 m/s    LVOT peak VTI 20.80 cm    Ao peak chapito 1.12 m/s    Ao VTI 20.6 cm    RVOT peak chapito 0.62 m/s    RVOT peak VTI 18.6 cm    LVOT stroke volume 79.75 cm3    AV peak gradient 5 mmHg    PV mean gradient 0.91 mmHg    E/E' ratio 8.10 m/s    MV Peak E Chapito 0.81 m/s    TR Max Chapito 2.40 m/s    MV Peak A Chapito 0.79 m/s    LV Systolic Volume 45.10 mL    LV Systolic Volume Index 24.9 mL/m2    LV Diastolic Volume 94.13 mL    LV Diastolic Volume Index 52.01 mL/m2    LA Volume Index 36.0 mL/m2    LV Mass Index 105 g/m2    RA Major Axis 4.24 cm    Left Atrium Minor Axis 4.42 cm    Left Atrium Major Axis 5.07 cm    Triscuspid Valve Regurgitation Peak Gradient 23 mmHg    RA Width 4.00 cm    Right Atrial Pressure (from IVC) 8 mmHg    EF 50 %    TV rest pulmonary artery pressure 31 mmHg    Narrative    · The left ventricle is normal in size with concentric hypertrophy and low   normal systolic function.  · Mild left atrial enlargement.  · The estimated ejection fraction is 50%.  · Normal left ventricular diastolic function.  · Normal right ventricular size with normal right ventricular systolic   function.  · Mild mitral regurgitation.  · Mild tricuspid regurgitation.  · Intermediate central venous pressure (8 mmHg).  · The estimated PA systolic pressure is 31 mmHg.      , EKG: reviewed, Stress Test: reviewed, and X-Ray: CXR: X-Ray Chest 1 View (CXR): No results found for this visit on 07/24/23.

## 2023-07-26 NOTE — PLAN OF CARE
[facility] - Tele[facility] (Hospital)  Discharge Final Note    Primary Care Provider: Franci Arteaga MD    Expected Discharge Date: 7/26/2023    Final Discharge Note (most recent)       Final Note - 07/26/23 1047          Final Note    Assessment Type Final Discharge Note     Anticipated Discharge Disposition Home or Self Care     Hospital Resources/Appts/Education Provided Appointments scheduled and added to AVS        Post-Acute Status    Coverage [employer]     Discharge Delays None known at this time                     Important Message from Medicare             Contact Info       Franci Arteaga MD   Specialty: Family Medicine   Relationship: PCP - General    [address]  [address]  [place] [address]   Phone: [phone]       Next Steps: Schedule an appointment as soon as possible for a visit in 1 week(s)    Instructions: Hospital discharge follow up    Germán Casas MD   Specialty: Cardiology    [address]  [place] [address]   Phone: [phone]       Next Steps: Schedule an appointment as soon as possible for a visit in 2 week(s)    Instructions: Hospital discharge follow up          DC Disposition: home with family  Family Notified: Patient by nurse  Transportation: personal transportation    Patient had no equipment or placement needs from .     Patient has PCP hospital follow up with Juan Jose Sandoval DNP , on 7/28/23 at 7:20 am.

## 2023-07-26 NOTE — HOSPITAL COURSE
7/26/23 Pt seen and exmined today, reports some chest pains still and nausea. Nuclear stress negative

## 2023-07-26 NOTE — DISCHARGE SUMMARY
[facility] ([facility])  [facility] Medicine  Discharge Summary      Patient Name: Dalia Ozuna  MRN: [id number]  [facility]: [id number]  Patient Class: OP- Observation  Admission Date: 7/24/2023  Hospital Length of Stay: 0 days  Discharge Date and Time: No discharge date for patient encounter.  Attending Physician: Dylon Mosley MD   Discharging Provider: Dylon Mosley MD  Primary Care Provider: Franci Arteaga MD    Primary Care Team: Networked reference to record PCT     HPI:   Ms. Ozuna is a 47-year-old  female with PMH significant for HTN, tobacco use, presented to the ED complaining of few weeks of intermittent chest pain, worsened with exertion, associated with nausea without vomiting, worsening dyspnea on exertion.  No prior known history of CAD.  Never had cardiac interventions in the past.  Reports chest discomfort as burning, radiating to the left shoulder, left arm, intermittent, exacerbated by stress/exertion, relieved with rest.  Never seen Cardiology before.  Currently chest pain-free.  EKG NSR with no ST or T-wave changes.  Initial troponin within normal limits.  Patient intolerant to aspirin, states it burns in her stomach.  ED physician discussed case with Cardiology on-call, recommended placing in observation.      Placed in observation for chest pain, rule out ACS.      * No surgery found *      Hospital Course:   7/25/23  NAEON. Patient reports continued chest pain, improved since admit  EKG with inverted T waves inversions noted  Troponin trend WNL  Cardiology following, TTE and MPI pending    7/26/23  GEOVANNIkaleigh reports improved pain, pointing to lower sternum, epigastric region, described as aching.  She reports some improvement with taking Protonix and Zofran  MPI negative for ischemia    Stable for hospital discharge  Prescribe trial of Protonix on discharge  Advised to f/u with PCP and Cardiology in 1-2 weeks for further management       Goals of Care Treatment  Preferences:  Code Status: Full Code      Consults:   Consults (From admission, onward)        Status Ordering Provider     Inpatient consult to Cardiology  Once        Provider:  Ivette Rodney MD    Completed LEONEL JUARES          No new Assessment & Plan notes have been filed under this hospital service since the last note was generated.  Service: Hospital Medicine    Final Active Diagnoses:    Diagnosis Date Noted POA    GERD (gastroesophageal reflux disease) [K21.9] 07/26/2023 Yes     Chronic    Claudication [I73.9] 07/25/2023 Yes    Primary hypertension [I10] 07/24/2023 Yes    Anxiety [F41.9] 07/24/2023 Yes    Smoker [F17.200] 05/10/2021 Yes      Problems Resolved During this Admission:    Diagnosis Date Noted Date Resolved POA    PRINCIPAL PROBLEM:  Chest pain [R07.9] 07/24/2023 07/26/2023 Yes       Discharged Condition: stable    Disposition:     Follow Up:   Follow-up Information     Franci Arteaga MD. Schedule an appointment as soon as possible for a visit in 1 week(s).    Specialty: Family Medicine  Why: Hospital discharge follow up  Contact information:  [address]  [address]  [place] [address]  [phone]             Germán Casas MD. Schedule an appointment as soon as possible for a visit in 2 week(s).    Specialty: Cardiology  Why: Hospital discharge follow up  Contact information:  [address]  [place] [address]  [phone]                       Patient Instructions:   No discharge procedures on file.    Significant Diagnostic Studies: Labs: All labs within the past 24 hours have been reviewed    Pending Diagnostic Studies:     None         Medications:  Reconciled Home Medications:      Medication List      START taking these medications    atorvastatin 40 MG tablet  Commonly known as: LIPITOR  Take 1 tablet (40 mg total) by mouth every evening.     metoprolol tartrate 25 MG tablet  Commonly known as: LOPRESSOR  Take 1 tablet (25 mg total) by mouth 2  (two) times daily.     nicotine 21 mg/24 hr  Commonly known as: NICODERM CQ  Place 1 patch onto the skin once daily.  Start taking on: July 27, 2023     ondansetron 4 MG Tbdl  Commonly known as: ZOFRAN-ODT  Take 1 tablet (4 mg total) by mouth every 6 (six) hours as needed (Nausea).     traMADoL 50 mg tablet  Commonly known as: ULTRAM  Take 1 tablet (50 mg total) by mouth every 8 (eight) hours as needed for Pain.        CHANGE how you take these medications    pantoprazole 40 MG tablet  Commonly known as: PROTONIX  Take 1 tablet (40 mg total) by mouth once daily.  Start taking on: July 27, 2023  What changed:   · medication strength  · how much to take        CONTINUE taking these medications    albuterol 90 mcg/actuation inhaler  Commonly known as: PROVENTIL/VENTOLIN HFA  Inhale 2 puffs into the lungs every 4 (four) hours as needed for Wheezing.     amLODIPine 10 MG tablet  Commonly known as: NORVASC  Take 1 tablet (10 mg total) by mouth once daily.     ibuprofen 800 MG tablet  Commonly known as: ADVIL,MOTRIN  Take 1 tablet (800 mg total) by mouth every 8 (eight) hours as needed for Pain.     sertraline 25 MG tablet  Commonly known as: ZOLOFT  TAKE 1 TABLET BY MOUTH EVERY DAY     tiZANidine 4 MG tablet  Commonly known as: ZANAFLEX  Take 4 mg by mouth every 8 (eight) hours.            Indwelling Lines/Drains at time of discharge:   Lines/Drains/Airways     None                 Time spent on the discharge of patient: 35 minutes         Dylon Mosley MD  Department of Hospital Medicine  [facility] ([facility])

## 2023-07-26 NOTE — PROGRESS NOTES
[facility] - Tele[facility] ([facility])  Cardiology  Progress Note    Patient Name: Dalia Ozuna  MRN: [id number]  Admission Date: 7/24/2023  Hospital Length of Stay: 0 days  Code Status: Full Code   Attending Physician: Dylon Mosley MD   Primary Care Physician: Franci Arteaga MD  Expected Discharge Date: 7/26/2023  Principal Problem:Chest pain    Subjective:   HPI:   Ms. Ozuna is a 47 year old female patient whose current medical conditions include HTN, tobacco abuse, and obesity who presented to [facility] ED yesterday with a chief complaint of substernal burning chest pain over the past two weeks. Patient reported the pain worsened with exertion and sometimes radiated to her left arm and left leg. Improved with rest. She denied any associated nausea, vomiting, diaphoresis, palpitations, near syncope, or syncope. Initial workup in ED un-revealing. Cardiology consulted to assist with management. Patient seen and examined today, sitting up in bed. Feels ok. Still having chest pain symptoms, has element of tenderness upon palpation and will occasionally have pain with breathing. Denies any prior CV history. She reports compliance with her medications. Active smoker. Serial troponin negative. EKG reviewed, T wave inversions noted. Echo and MPI stress test pending.  Hospital Course:   7/26/23 Pt seen and exmined today, reports some chest pains still and nausea. Nuclear stress negative           Review of Systems   Constitutional: Positive for malaise/fatigue.   HENT: Negative.     Eyes: Negative.    Cardiovascular:  Positive for chest pain.   Respiratory: Negative.     Skin: Negative.    Musculoskeletal: Negative.    Gastrointestinal:  Positive for abdominal pain and nausea.   Genitourinary: Negative.    Neurological: Negative.    Psychiatric/Behavioral: Negative.     Objective:     Vital Signs (Most Recent):  Temp: 98.3 °F (36.8 °C) (07/26/23 0728)  Pulse: 72 (07/26/23 0914)  Resp: 16 (07/26/23 0842)  BP: 103/61 (07/26/23  0728)  SpO2: 98 % (07/26/23 0728) Vital Signs (24h Range):  Temp:  [97.8 °F (36.6 °C)-99 °F (37.2 °C)] 98.3 °F (36.8 °C)  Pulse:  [71-79] 72  Resp:  [15-18] 16  SpO2:  [98 %-100 %] 98 %  BP: (103-123)/(60-82) 103/61     Weight: 84.4 kg (186 lb)  Body mass index is 36.33 kg/m².     SpO2: 98 %       No intake or output data in the 24 hours ending 07/26/23 1115    Lines/Drains/Airways       Peripheral Intravenous Line  Duration                  Peripheral IV - Single Lumen 07/24/23 1409 20 G Right Antecubital 1 day                       Physical Exam  Vitals and nursing note reviewed.   Constitutional:       Appearance: Normal appearance.   HENT:      Head: Normocephalic.   Eyes:      Pupils: Pupils are equal, round, and reactive to light.   Cardiovascular:      Rate and Rhythm: Normal rate and regular rhythm.      Heart sounds: Normal heart sounds, S1 normal and S2 normal. No murmur heard.    No S3 or S4 sounds.   Pulmonary:      Effort: Pulmonary effort is normal.      Breath sounds: Normal breath sounds.   Abdominal:      General: Bowel sounds are normal.      Palpations: Abdomen is soft.   Musculoskeletal:         General: Normal range of motion.      Cervical back: Normal range of motion.   Skin:     Capillary Refill: Capillary refill takes less than 2 seconds.   Neurological:      General: No focal deficit present.      Mental Status: She is alert and oriented to person, place, and time.   Psychiatric:         Mood and Affect: Mood normal.         Behavior: Behavior normal.         Thought Content: Thought content normal.          Significant Labs: BMP:   Recent Labs   Lab 07/24/23  1409 07/25/23  0545   GLU 92 89    138   K 3.5 3.7    107   CO2 24 25   BUN 6 7   CREATININE 0.7 0.7   CALCIUM 8.7 8.4*   MG  --  2.2   , CMP   Recent Labs   Lab 07/24/23  1409 07/25/23  0545    138   K 3.5 3.7    107   CO2 24 25   GLU 92 89   BUN 6 7   CREATININE 0.7 0.7   CALCIUM 8.7 8.4*   PROT 6.8 6.8    ALBUMIN 3.8 3.8   BILITOT 0.2 0.2   ALKPHOS 54* 52*   AST 11 15   ALT 12 13   ANIONGAP 8 6*   , CBC   Recent Labs   Lab 07/24/23  1409 07/25/23  0545   WBC 8.02 8.08   HGB 11.9* 12.0   HCT 37.1 38.4    280   , INR No results for input(s): INR, PROTIME in the last 48 hours., Lipid Panel   Recent Labs   Lab 07/25/23  0545   CHOL 173   HDL 29*   LDLCALC 102.0   TRIG 210*   CHOLHDL 16.8*   , Troponin   Recent Labs   Lab 07/25/23  0011 07/25/23  0545 07/25/23  1809   TROPONINI <0.006 <0.006 <0.006   , and All pertinent lab results from the last 24 hours have been reviewed.    Significant Imaging: Echocardiogram: Transthoracic echo (TTE) complete ([facility] Only):   Results for orders placed or performed during the hospital encounter of 07/24/23   Echo   Result Value Ref Range    BSA 1.89 m2    TDI SEPTAL 0.10 m/s    LV LATERAL E/E' RATIO 8.10 m/s    LV SEPTAL E/E' RATIO 8.10 m/s    LA WIDTH 4.00 cm    IVC diameter 1.63 cm    Left Ventricular Outflow Tract Mean Velocity 0.60 cm/s    Left Ventricular Outflow Tract Mean Gradient 1.65 mmHg    TDI LATERAL 0.10 m/s    LVIDd 4.53 3.5 - 6.0 cm    IVS 1.11 (A) 0.6 - 1.1 cm    Posterior Wall 1.20 (A) 0.6 - 1.1 cm    Ao root annulus 3.11 cm    LVIDs 3.33 2.1 - 4.0 cm    FS 26 28 - 44 %    LA volume 65.19 cm3    STJ 2.89 cm    Ascending aorta 2.82 cm    LV mass 189.51 g    LA size 4.06 cm    TAPSE 2.00 cm    Left Ventricle Relative Wall Thickness 0.53 cm    AV mean gradient 3 mmHg    AV valve area 3.87 cm2    AV Velocity Ratio 0.78     AV index (prosthetic) 1.01     E/A ratio 1.03     Mean e' 0.10 m/s    E wave deceleration time 217.15 msec    IVRT 87.54 msec    LVOT diameter 2.21 cm    LVOT area 3.8 cm2    LVOT peak chapito 0.87 m/s    LVOT peak VTI 20.80 cm    Ao peak chapito 1.12 m/s    Ao VTI 20.6 cm    RVOT peak chapito 0.62 m/s    RVOT peak VTI 18.6 cm    LVOT stroke volume 79.75 cm3    AV peak gradient 5 mmHg    PV mean gradient 0.91 mmHg    E/E' ratio 8.10 m/s    MV Peak E Chapito 0.81  m/s    TR Max Chapito 2.40 m/s    MV Peak A Chapito 0.79 m/s    LV Systolic Volume 45.10 mL    LV Systolic Volume Index 24.9 mL/m2    LV Diastolic Volume 94.13 mL    LV Diastolic Volume Index 52.01 mL/m2    LA Volume Index 36.0 mL/m2    LV Mass Index 105 g/m2    RA Major Axis 4.24 cm    Left Atrium Minor Axis 4.42 cm    Left Atrium Major Axis 5.07 cm    Triscuspid Valve Regurgitation Peak Gradient 23 mmHg    RA Width 4.00 cm    Right Atrial Pressure (from IVC) 8 mmHg    EF 50 %    TV rest pulmonary artery pressure 31 mmHg    Narrative    · The left ventricle is normal in size with concentric hypertrophy and low   normal systolic function.  · Mild left atrial enlargement.  · The estimated ejection fraction is 50%.  · Normal left ventricular diastolic function.  · Normal right ventricular size with normal right ventricular systolic   function.  · Mild mitral regurgitation.  · Mild tricuspid regurgitation.  · Intermediate central venous pressure (8 mmHg).  · The estimated PA systolic pressure is 31 mmHg.      , EKG: reviewed, Stress Test: reviewed, and X-Ray: CXR: X-Ray Chest 1 View (CXR): No results found for this visit on 07/24/23.    Assessment and Plan:       * Chest pain  -Presents with CP symptoms with typical and atypical features  -Serial troponin negative  -EKG reviewed, some T wave inversions noted  -Continue ASA, BB, CCB  -Check echo and MPI stress test    7/26/23  Stress test neg  Cont current CV meds  F/u in clinic  Likely noncardiac related pain    Claudication  -Endorses cramping pains with walking  -Check LUIS/BLE arterial U/S    Primary hypertension  -Continue CCB, BB  -Monitor    Smoker  -Cessation advised        VTE Risk Mitigation (From admission, onward)         Ordered     enoxaparin injection 40 mg  Daily         07/24/23 1956     Place sequential compression device  Until discontinued         07/24/23 1956                April Koroma NP  Cardiology  [facility] - Telemetry ([facility])

## 2023-07-28 ENCOUNTER — OFFICE VISIT (OUTPATIENT)
Dept: PRIMARY CARE CLINIC | Facility: CLINIC | Age: 48
End: 2023-07-28
Payer: COMMERCIAL

## 2023-07-28 DIAGNOSIS — I10 ESSENTIAL HYPERTENSION: Primary | ICD-10-CM

## 2023-07-28 DIAGNOSIS — R07.89 ATYPICAL CHEST PAIN: ICD-10-CM

## 2023-07-28 PROCEDURE — 99214 OFFICE O/P EST MOD 30 MIN: CPT | Mod: 95,,, | Performed by: NURSE PRACTITIONER

## 2023-07-28 PROCEDURE — 1159F MED LIST DOCD IN RCRD: CPT | Mod: CPTII,95,, | Performed by: NURSE PRACTITIONER

## 2023-07-28 PROCEDURE — 3044F PR MOST RECENT HEMOGLOBIN A1C LEVEL <7.0%: ICD-10-PCS | Mod: CPTII,95,, | Performed by: NURSE PRACTITIONER

## 2023-07-28 PROCEDURE — 99214 PR OFFICE/OUTPT VISIT, EST, LEVL IV, 30-39 MIN: ICD-10-PCS | Mod: 95,,, | Performed by: NURSE PRACTITIONER

## 2023-07-28 PROCEDURE — 3044F HG A1C LEVEL LT 7.0%: CPT | Mod: CPTII,95,, | Performed by: NURSE PRACTITIONER

## 2023-07-28 PROCEDURE — 1160F RVW MEDS BY RX/DR IN RCRD: CPT | Mod: CPTII,95,, | Performed by: NURSE PRACTITIONER

## 2023-07-28 PROCEDURE — 1159F PR MEDICATION LIST DOCUMENTED IN MEDICAL RECORD: ICD-10-PCS | Mod: CPTII,95,, | Performed by: NURSE PRACTITIONER

## 2023-07-28 PROCEDURE — 1160F PR REVIEW ALL MEDS BY PRESCRIBER/CLIN PHARMACIST DOCUMENTED: ICD-10-PCS | Mod: CPTII,95,, | Performed by: NURSE PRACTITIONER

## 2023-07-28 RX ORDER — ESCITALOPRAM OXALATE 10 MG/1
10 TABLET ORAL DAILY
Qty: 30 TABLET | Refills: 3 | Status: SHIPPED | OUTPATIENT
Start: 2023-07-28 | End: 2024-07-27

## 2023-07-28 NOTE — PROGRESS NOTES
Subjective:       Patient ID: Dalia Ozuna is a 47 y.o. female.  Chief Complaint: Hospital Follow up and medication refills  The patient location is: [place], [place]    Visit type: audiovisual-Synchronous      Face to Face time with patient: 11 min  12 minutes of total time spent on the encounter, which includes face to face time and non-face to face time preparing to see the patient (eg, review of tests), Obtaining and/or reviewing separately obtained history, Documenting clinical information in the electronic or other health record, Independently interpreting results (not separately reported) and communicating results to the patient/family/caregiver, or Care coordination (not separately reported).         Each patient to whom he or she provides medical services by telemedicine is:  (1) informed of the relationship between the physician and patient and the respective role of any other health care provider with respect to management of the patient; and (2) notified that he or she may decline to receive medical services by telemedicine and may withdraw from such care at any time.       History of Present Illness:   Dalia Ozuna 47 y.o. female presents today for medication management and refills for anxiety/depression and hospital follow up. Patient was hospitalized from 7/24/23 to 7/26/2023 with chest pains. Patient has appointment with cardiology next week. Encouraged patient to keep follow up appointment and to report back to ER if symptoms return or worsen. Treatment options and alternatives were discussed with the patient. Patient provided opportunity to ask additional questions.  All questions were answered. Voices understanding and acceptance of this advice. Instructed to call back if any further questions or concerns.      Past Medical History:   Diagnosis Date    Essential hypertension     Pneumonia      Family History   Problem Relation Age of Onset    Breast cancer Maternal Grandmother       Social History     Socioeconomic History    Marital status:    Tobacco Use    Smoking status: Every Day     Packs/day: 2.00     Years: 26.00     Pack years: 52.00     Types: Cigarettes     Start date: 5/1/1998    Smokeless tobacco: Never    Tobacco comments:     currently taking chantix    Substance and Sexual Activity    Alcohol use: Not Currently    Drug use: Never    Sexual activity: Yes     Partners: Male     Social Determinants of Health     Financial Resource Strain: High Risk    Difficulty of Paying Living Expenses: Very hard   Food Insecurity: Food Insecurity Present    Worried About Running Out of Food in the Last Year: Often true    Ran Out of Food in the Last Year: Often true   Transportation Needs: No Transportation Needs    Lack of Transportation (Medical): No    Lack of Transportation (Non-Medical): No   Physical Activity: Inactive    Days of Exercise per Week: 4 days    Minutes of Exercise per Session: 0 min   Stress: Stress Concern Present    Feeling of Stress : Very much   Social Connections: Unknown    Frequency of Communication with Friends and Family: More than three times a week    Frequency of Social Gatherings with Friends and Family: Never    Active Member of Clubs or Organizations: No    Attends Club or Organization Meetings: Never    Marital Status:    Housing Stability: High Risk    Unable to Pay for Housing in the Last Year: Yes    Number of Places Lived in the Last Year: 1    Unstable Housing in the Last Year: Yes     Outpatient Encounter Medications as of 7/28/2023   Medication Sig Dispense Refill    albuterol (PROVENTIL/VENTOLIN HFA) 90 mcg/actuation inhaler Inhale 2 puffs into the lungs every 4 (four) hours as needed for Wheezing. 18 g 3    amLODIPine (NORVASC) 10 MG tablet Take 1 tablet (10 mg total) by mouth once daily. 30 tablet 3    atorvastatin (LIPITOR) 40 MG tablet Take 1 tablet (40 mg total) by mouth every evening. 90 tablet 1     ibuprofen (ADVIL,MOTRIN) 800 MG tablet Take 1 tablet (800 mg total) by mouth every 8 (eight) hours as needed for Pain. 30 tablet 0    metoprolol tartrate (LOPRESSOR) 25 MG tablet Take 1 tablet (25 mg total) by mouth 2 (two) times daily. 60 tablet 2    nicotine (NICODERM CQ) 21 mg/24 hr Place 1 patch onto the skin once daily. 28 patch 1    ondansetron (ZOFRAN-ODT) 4 MG TbDL Take 1 tablet (4 mg total) by mouth every 6 (six) hours as needed (Nausea). 20 tablet 0    pantoprazole (PROTONIX) 40 MG tablet Take 1 tablet (40 mg total) by mouth once daily. 30 tablet 2    sertraline (ZOLOFT) 25 MG tablet TAKE 1 TABLET BY MOUTH EVERY DAY 30 tablet 0    tiZANidine (ZANAFLEX) 4 MG tablet Take 4 mg by mouth every 8 (eight) hours.      traMADoL (ULTRAM) 50 mg tablet Take 1 tablet (50 mg total) by mouth every 8 (eight) hours as needed for Pain. 10 tablet 0     No facility-administered encounter medications on file as of 7/28/2023.       Review of Systems   Constitutional:  Positive for activity change and unexpected weight change.   HENT:  Negative for hearing loss, rhinorrhea and trouble swallowing.    Eyes:  Negative for discharge and visual disturbance.   Respiratory:  Negative for wheezing.    Cardiovascular:  Negative for palpitations.   Gastrointestinal:  Negative for blood in stool, constipation, diarrhea and vomiting.   Endocrine: Negative for polyuria.   Genitourinary:  Negative for difficulty urinating, dysuria, hematuria and menstrual problem.   Musculoskeletal:  Negative for arthralgias, joint swelling and neck pain.   Neurological:  Negative for headaches.   Psychiatric/Behavioral:  Positive for dysphoric mood. The patient is nervous/anxious.      Objective:      LMP  (LMP Unknown)   Physical Exam  Constitutional:       Appearance: She is obese. She is not ill-appearing or toxic-appearing.   Neurological:      Mental Status: She is alert.   Psychiatric:         Attention and Perception: Attention and perception  normal.         Mood and Affect: Mood is depressed.       Results for orders placed or performed during the hospital encounter of 07/24/23   CBC auto differential   Result Value Ref Range    WBC 8.02 3.90 - 12.70 K/uL    RBC 4.08 4.00 - 5.40 M/uL    Hemoglobin 11.9 (L) 12.0 - 16.0 g/dL    Hematocrit 37.1 37.0 - 48.5 %    MCV 91 82 - 98 fL    MCH 29.2 27.0 - 31.0 pg    MCHC 32.1 32.0 - 36.0 g/dL    RDW 13.8 11.5 - 14.5 %    Platelets 266 150 - 450 K/uL    MPV 10.5 9.2 - 12.9 fL    Immature Granulocytes 0.1 0.0 - 0.5 %    Gran # (ANC) 4.8 1.8 - 7.7 K/uL    Immature Grans (Abs) 0.01 0.00 - 0.04 K/uL    Lymph # 2.6 1.0 - 4.8 K/uL    Mono # 0.5 0.3 - 1.0 K/uL    Eos # 0.1 0.0 - 0.5 K/uL    Baso # 0.03 0.00 - 0.20 K/uL    nRBC 0 0 /100 WBC    Gran % 59.8 38.0 - 73.0 %    Lymph % 31.9 18.0 - 48.0 %    Mono % 6.1 4.0 - 15.0 %    Eosinophil % 1.7 0.0 - 8.0 %    Basophil % 0.4 0.0 - 1.9 %    Differential Method Automated    Comprehensive metabolic panel   Result Value Ref Range    Sodium 139 136 - 145 mmol/L    Potassium 3.5 3.5 - 5.1 mmol/L    Chloride 107 95 - 110 mmol/L    CO2 24 23 - 29 mmol/L    Glucose 92 70 - 110 mg/dL    BUN 6 6 - 20 mg/dL    Creatinine 0.7 0.5 - 1.4 mg/dL    Calcium 8.7 8.7 - 10.5 mg/dL    Total Protein 6.8 6.0 - 8.4 g/dL    Albumin 3.8 3.5 - 5.2 g/dL    Total Bilirubin 0.2 0.1 - 1.0 mg/dL    Alkaline Phosphatase 54 (L) 55 - 135 U/L    AST 11 10 - 40 U/L    ALT 12 10 - 44 U/L    eGFR >60 >60 mL/min/1.73 m^2    Anion Gap 8 8 - 16 mmol/L   Troponin I #1   Result Value Ref Range    Troponin I <0.006 0.000 - 0.026 ng/mL   BNP   Result Value Ref Range    BNP 11 0 - 99 pg/mL   Pregnancy, urine rapid (UPT)   Result Value Ref Range    Preg Test, Ur Negative    Lipase   Result Value Ref Range    Lipase 15 4 - 60 U/L   Troponin I   Result Value Ref Range    Troponin I <0.006 0.000 - 0.026 ng/mL   Troponin I   Result Value Ref Range    Troponin I <0.006 0.000 - 0.026 ng/mL   CBC Auto Differential   Result Value  Ref Range    WBC 8.08 3.90 - 12.70 K/uL    RBC 4.16 4.00 - 5.40 M/uL    Hemoglobin 12.0 12.0 - 16.0 g/dL    Hematocrit 38.4 37.0 - 48.5 %    MCV 92 82 - 98 fL    MCH 28.8 27.0 - 31.0 pg    MCHC 31.3 (L) 32.0 - 36.0 g/dL    RDW 13.8 11.5 - 14.5 %    Platelets 280 150 - 450 K/uL    MPV 10.2 9.2 - 12.9 fL    Immature Granulocytes 0.1 0.0 - 0.5 %    Gran # (ANC) 4.2 1.8 - 7.7 K/uL    Immature Grans (Abs) 0.01 0.00 - 0.04 K/uL    Lymph # 3.2 1.0 - 4.8 K/uL    Mono # 0.5 0.3 - 1.0 K/uL    Eos # 0.2 0.0 - 0.5 K/uL    Baso # 0.02 0.00 - 0.20 K/uL    nRBC 0 0 /100 WBC    Gran % 52.2 38.0 - 73.0 %    Lymph % 39.2 18.0 - 48.0 %    Mono % 6.3 4.0 - 15.0 %    Eosinophil % 2.0 0.0 - 8.0 %    Basophil % 0.2 0.0 - 1.9 %    Differential Method Automated    Magnesium   Result Value Ref Range    Magnesium 2.2 1.6 - 2.6 mg/dL   Comprehensive Metabolic Panel   Result Value Ref Range    Sodium 138 136 - 145 mmol/L    Potassium 3.7 3.5 - 5.1 mmol/L    Chloride 107 95 - 110 mmol/L    CO2 25 23 - 29 mmol/L    Glucose 89 70 - 110 mg/dL    BUN 7 6 - 20 mg/dL    Creatinine 0.7 0.5 - 1.4 mg/dL    Calcium 8.4 (L) 8.7 - 10.5 mg/dL    Total Protein 6.8 6.0 - 8.4 g/dL    Albumin 3.8 3.5 - 5.2 g/dL    Total Bilirubin 0.2 0.1 - 1.0 mg/dL    Alkaline Phosphatase 52 (L) 55 - 135 U/L    AST 15 10 - 40 U/L    ALT 13 10 - 44 U/L    eGFR >60 >60 mL/min/1.73 m^2    Anion Gap 6 (L) 8 - 16 mmol/L   Troponin I   Result Value Ref Range    Troponin I <0.006 0.000 - 0.026 ng/mL   Hemoglobin A1C   Result Value Ref Range    Hemoglobin A1C 5.5 4.0 - 5.6 %    Estimated Avg Glucose 111 68 - 131 mg/dL   Lipid Panel   Result Value Ref Range    Cholesterol 173 120 - 199 mg/dL    Triglycerides 210 (H) 30 - 150 mg/dL    HDL 29 (L) 40 - 75 mg/dL    LDL Cholesterol 102.0 63.0 - 159.0 mg/dL    HDL/Cholesterol Ratio 16.8 (L) 20.0 - 50.0 %    Total Cholesterol/HDL Ratio 6.0 (H) 2.0 - 5.0    Non-HDL Cholesterol 144 mg/dL   Troponin I   Result Value Ref Range    Troponin I <0.006  0.000 - 0.026 ng/mL   Nuclear Stress - Cardiology Interpreted   Result Value Ref Range    85% Max Predicted      Max Predicted      [facility] CV CPX PATIENT IS MALE 0.0     [facility] CV CPX PATIENT IS FEMALE 1.0     Systolic blood pressure 114 mmHg    Diastolic blood pressure 81 mmHg    HR at rest 70 bpm    Peak Systolic  mmHg    Peak Diatolic BP 81 mmHg    Peak  bpm    % Max HR Achieved 62     RPP 7,980     Peak RPP 11,628     EF + QEF 59 %    Ejection Fraction- High Stress 73 %    End diastolic index (mL/m2) 101 mL/m2    End diastolic index (mL/m2) 165 mL/m2    End systolic index (mL/m2) 28 mL/m2    End systolic index (mL/m2) 64 mL/m2    Nuc Rest EF 62     Nuc Stress EF 64 %   Echo   Result Value Ref Range    BSA 1.89 m2    TDI SEPTAL 0.10 m/s    LV LATERAL E/E' RATIO 8.10 m/s    LV SEPTAL E/E' RATIO 8.10 m/s    LA WIDTH 4.00 cm    IVC diameter 1.63 cm    Left Ventricular Outflow Tract Mean Velocity 0.60 cm/s    Left Ventricular Outflow Tract Mean Gradient 1.65 mmHg    TDI LATERAL 0.10 m/s    LVIDd 4.53 3.5 - 6.0 cm    IVS 1.11 (A) 0.6 - 1.1 cm    Posterior Wall 1.20 (A) 0.6 - 1.1 cm    Ao root annulus 3.11 cm    LVIDs 3.33 2.1 - 4.0 cm    FS 26 28 - 44 %    LA volume 65.19 cm3    STJ 2.89 cm    Ascending aorta 2.82 cm    LV mass 189.51 g    LA size 4.06 cm    TAPSE 2.00 cm    Left Ventricle Relative Wall Thickness 0.53 cm    AV mean gradient 3 mmHg    AV valve area 3.87 cm2    AV Velocity Ratio 0.78     AV index (prosthetic) 1.01     E/A ratio 1.03     Mean e' 0.10 m/s    E wave deceleration time 217.15 msec    IVRT 87.54 msec    LVOT diameter 2.21 cm    LVOT area 3.8 cm2    LVOT peak chapito 0.87 m/s    LVOT peak VTI 20.80 cm    Ao peak chapito 1.12 m/s    Ao VTI 20.6 cm    RVOT peak chapito 0.62 m/s    RVOT peak VTI 18.6 cm    LVOT stroke volume 79.75 cm3    AV peak gradient 5 mmHg    PV mean gradient 0.91 mmHg    E/E' ratio 8.10 m/s    MV Peak E Chapito 0.81 m/s    TR Max Chapito 2.40 m/s    MV Peak A Chapito 0.79 m/s     LV Systolic Volume 45.10 mL    LV Systolic Volume Index 24.9 mL/m2    LV Diastolic Volume 94.13 mL    LV Diastolic Volume Index 52.01 mL/m2    LA Volume Index 36.0 mL/m2    LV Mass Index 105 g/m2    RA Major Axis 4.24 cm    Left Atrium Minor Axis 4.42 cm    Left Atrium Major Axis 5.07 cm    Triscuspid Valve Regurgitation Peak Gradient 23 mmHg    RA Width 4.00 cm    Right Atrial Pressure (from IVC) 8 mmHg    EF 50 %    TV rest pulmonary artery pressure 31 mmHg     Assessment:       No diagnosis found.    Plan:   There are no diagnoses linked to this encounter.         [facility]   [address] [place] [address] [address]  [place] [address]  Office [phone]  Fax [phone]

## 2023-08-01 ENCOUNTER — OFFICE VISIT (OUTPATIENT)
Dept: CARDIOLOGY | Facility: CLINIC | Age: 48
End: 2023-08-01
Payer: COMMERCIAL

## 2023-08-01 ENCOUNTER — LAB VISIT (OUTPATIENT)
Dept: LAB | Facility: HOSPITAL | Age: 48
End: 2023-08-01
Payer: MEDICAID

## 2023-08-01 VITALS
HEIGHT: 60 IN | SYSTOLIC BLOOD PRESSURE: 116 MMHG | OXYGEN SATURATION: 97 % | DIASTOLIC BLOOD PRESSURE: 78 MMHG | HEART RATE: 85 BPM | BODY MASS INDEX: 36.49 KG/M2 | WEIGHT: 185.88 LBS

## 2023-08-01 DIAGNOSIS — R07.89 ATYPICAL CHEST PAIN: Primary | ICD-10-CM

## 2023-08-01 DIAGNOSIS — R07.89 ATYPICAL CHEST PAIN: ICD-10-CM

## 2023-08-01 DIAGNOSIS — I73.9 CLAUDICATION: ICD-10-CM

## 2023-08-01 DIAGNOSIS — I10 PRIMARY HYPERTENSION: ICD-10-CM

## 2023-08-01 DIAGNOSIS — E66.01 SEVERE OBESITY (BMI 35.0-39.9) WITH COMORBIDITY: ICD-10-CM

## 2023-08-01 LAB
CRP SERPL-MCNC: 8.1 MG/L (ref 0–8.2)
ERYTHROCYTE [SEDIMENTATION RATE] IN BLOOD BY PHOTOMETRIC METHOD: 15 MM/HR (ref 0–36)

## 2023-08-01 PROCEDURE — 3044F PR MOST RECENT HEMOGLOBIN A1C LEVEL <7.0%: ICD-10-PCS | Mod: CPTII,S$GLB,,

## 2023-08-01 PROCEDURE — 3044F HG A1C LEVEL LT 7.0%: CPT | Mod: CPTII,S$GLB,,

## 2023-08-01 PROCEDURE — 1159F PR MEDICATION LIST DOCUMENTED IN MEDICAL RECORD: ICD-10-PCS | Mod: CPTII,S$GLB,,

## 2023-08-01 PROCEDURE — 1159F MED LIST DOCD IN RCRD: CPT | Mod: CPTII,S$GLB,,

## 2023-08-01 PROCEDURE — 99214 OFFICE O/P EST MOD 30 MIN: CPT | Mod: S$GLB,,,

## 2023-08-01 PROCEDURE — 86140 C-REACTIVE PROTEIN: CPT

## 2023-08-01 PROCEDURE — 3008F PR BODY MASS INDEX (BMI) DOCUMENTED: ICD-10-PCS | Mod: CPTII,S$GLB,,

## 2023-08-01 PROCEDURE — 99999 PR PBB SHADOW E&M-EST. PATIENT-LVL III: CPT | Mod: PBBFAC,,,

## 2023-08-01 PROCEDURE — 3078F DIAST BP <80 MM HG: CPT | Mod: CPTII,S$GLB,,

## 2023-08-01 PROCEDURE — 36415 COLL VENOUS BLD VENIPUNCTURE: CPT

## 2023-08-01 PROCEDURE — 3008F BODY MASS INDEX DOCD: CPT | Mod: CPTII,S$GLB,,

## 2023-08-01 PROCEDURE — 3078F PR MOST RECENT DIASTOLIC BLOOD PRESSURE < 80 MM HG: ICD-10-PCS | Mod: CPTII,S$GLB,,

## 2023-08-01 PROCEDURE — 1160F RVW MEDS BY RX/DR IN RCRD: CPT | Mod: CPTII,S$GLB,,

## 2023-08-01 PROCEDURE — 99999 PR PBB SHADOW E&M-EST. PATIENT-LVL III: ICD-10-PCS | Mod: PBBFAC,,,

## 2023-08-01 PROCEDURE — 3074F SYST BP LT 130 MM HG: CPT | Mod: CPTII,S$GLB,,

## 2023-08-01 PROCEDURE — 3074F PR MOST RECENT SYSTOLIC BLOOD PRESSURE < 130 MM HG: ICD-10-PCS | Mod: CPTII,S$GLB,,

## 2023-08-01 PROCEDURE — 99214 PR OFFICE/OUTPT VISIT, EST, LEVL IV, 30-39 MIN: ICD-10-PCS | Mod: S$GLB,,,

## 2023-08-01 PROCEDURE — 1160F PR REVIEW ALL MEDS BY PRESCRIBER/CLIN PHARMACIST DOCUMENTED: ICD-10-PCS | Mod: CPTII,S$GLB,,

## 2023-08-01 PROCEDURE — 85652 RBC SED RATE AUTOMATED: CPT

## 2023-08-01 NOTE — PROGRESS NOTES
Subjective:   Patient ID:  Dalia Ozuna is a 47 y.o. female who presents for evaluation of Shortness of Breath (Notices sob when sleeping) and Chest Pain (Dull chest pain that radiate from chest to back)      HPIMsJose Alejandro Ozuna is a 47 year old female patient whose current medical conditions include HTN, tobacco abuse, and obesity seen at [facility] ed for chest pain. Here today for follow up, stress test was negative Echo EF 50% chest pain was reproducible on exam, nav lE US negative for stenosis. Reports still having chest pains, and SOB. She reports yesterday she was doing laundry and felt a sharp/dull pain she also reports pain walking in the parking lot.    Smoker 2ppd  Exercises some at home      Past Medical History:   Diagnosis Date    Essential hypertension     Pneumonia        Past Surgical History:   Procedure Laterality Date     SECTION      ROTATOR CUFF REPAIR Right     ROTATOR CUFF REPAIR Right     TUBAL LIGATION         Social History     Tobacco Use    Smoking status: Every Day     Current packs/day: 2.00     Average packs/day: 2.0 packs/day for 26.0 years (52.0 ttl pk-yrs)     Types: Cigarettes     Start date: 1998    Smokeless tobacco: Never    Tobacco comments:     currently taking chantix    Substance Use Topics    Alcohol use: Not Currently    Drug use: Never       Family History   Problem Relation Age of Onset    Breast cancer Maternal Grandmother        Current Outpatient Medications on File Prior to Visit   Medication Sig Dispense Refill    amLODIPine (NORVASC) 10 MG tablet Take 1 tablet (10 mg total) by mouth once daily. 30 tablet 3    atorvastatin (LIPITOR) 40 MG tablet Take 1 tablet (40 mg total) by mouth every evening. 90 tablet 1    EScitalopram oxalate (LEXAPRO) 10 MG tablet Take 1 tablet (10 mg total) by mouth once daily. 30 tablet 3    ibuprofen (ADVIL,MOTRIN) 800 MG tablet Take 1 tablet (800 mg total) by mouth every 8 (eight) hours as needed for Pain. 30 tablet 0     metoprolol tartrate (LOPRESSOR) 25 MG tablet Take 1 tablet (25 mg total) by mouth 2 (two) times daily. 60 tablet 2    nicotine (NICODERM CQ) 21 mg/24 hr Place 1 patch onto the skin once daily. 28 patch 1    ondansetron (ZOFRAN-ODT) 4 MG TbDL Take 1 tablet (4 mg total) by mouth every 6 (six) hours as needed (Nausea). 20 tablet 0    pantoprazole (PROTONIX) 40 MG tablet Take 1 tablet (40 mg total) by mouth once daily. 30 tablet 2    tiZANidine (ZANAFLEX) 4 MG tablet Take 4 mg by mouth every 8 (eight) hours.      traMADoL (ULTRAM) 50 mg tablet Take 1 tablet (50 mg total) by mouth every 8 (eight) hours as needed for Pain. 10 tablet 0    albuterol (PROVENTIL/VENTOLIN HFA) 90 mcg/actuation inhaler Inhale 2 puffs into the lungs every 4 (four) hours as needed for Wheezing. (Patient not taking: Reported on 8/1/2023) 18 g 3     No current facility-administered medications on file prior to visit.      Wt Readings from Last 3 Encounters:   08/01/23 84.3 kg (185 lb 13.6 oz)   07/25/23 84.4 kg (186 lb)   07/18/23 81.9 kg (180 lb 8.9 oz)     Temp Readings from Last 3 Encounters:   07/26/23 98.3 °F (36.8 °C) (Oral)   06/17/22 98.1 °F (36.7 °C) (Temporal)   06/14/22 98.3 °F (36.8 °C)     BP Readings from Last 3 Encounters:   08/01/23 116/78   07/26/23 103/61   08/24/22 118/72     Pulse Readings from Last 3 Encounters:   08/01/23 85   07/26/23 72   08/24/22 86        Review of Systems   Constitutional: Positive for malaise/fatigue.   HENT: Negative.     Eyes: Negative.    Cardiovascular:  Positive for chest pain.   Respiratory:  Positive for shortness of breath.    Skin: Negative.    Musculoskeletal: Negative.    Gastrointestinal: Negative.    Genitourinary: Negative.    Neurological: Negative.    Psychiatric/Behavioral: Negative.         Objective:   Physical Exam  Vitals and nursing note reviewed.   Constitutional:       Appearance: Normal appearance.   HENT:      Head: Normocephalic.   Eyes:      Pupils: Pupils are equal, round,  "and reactive to light.   Cardiovascular:      Rate and Rhythm: Normal rate and regular rhythm.      Heart sounds: Normal heart sounds, S1 normal and S2 normal. No murmur heard.     No S3 or S4 sounds.   Pulmonary:      Effort: Pulmonary effort is normal.      Breath sounds: Normal breath sounds.   Abdominal:      General: Bowel sounds are normal.      Palpations: Abdomen is soft.   Musculoskeletal:         General: Tenderness present. Normal range of motion.      Cervical back: Normal range of motion.   Skin:     Capillary Refill: Capillary refill takes less than 2 seconds.   Neurological:      General: No focal deficit present.      Mental Status: She is alert and oriented to person, place, and time.   Psychiatric:         Mood and Affect: Mood normal.         Behavior: Behavior normal.         Thought Content: Thought content normal.         Lab Results   Component Value Date    CHOL 173 07/25/2023     Lab Results   Component Value Date    HDL 29 (L) 07/25/2023     Lab Results   Component Value Date    LDLCALC 102.0 07/25/2023     Lab Results   Component Value Date    TRIG 210 (H) 07/25/2023    TRIG 105 06/15/2021     Lab Results   Component Value Date    CHOLHDL 16.8 (L) 07/25/2023       Chemistry        Component Value Date/Time     07/25/2023 0545    K 3.7 07/25/2023 0545     07/25/2023 0545    CO2 25 07/25/2023 0545    BUN 7 07/25/2023 0545    CREATININE 0.7 07/25/2023 0545    GLU 89 07/25/2023 0545        Component Value Date/Time    CALCIUM 8.4 (L) 07/25/2023 0545    ALKPHOS 52 (L) 07/25/2023 0545    AST 15 07/25/2023 0545    ALT 13 07/25/2023 0545    BILITOT 0.2 07/25/2023 0545    ESTGFRAFRICA >60 06/14/2022 1025    EGFRNONAA >60 06/14/2022 1025          Lab Results   Component Value Date    TSH 0.683 01/19/2022     No results found for: "INR", "PROTIME"  @RESUFAST(WBC,HGB,HCT,MCV,PLT)  @LABRCNTIP(BNP,BNPTRIAGEBLO)@  CrCl cannot be calculated (Patient's most recent lab result is older than the " maximum 7 days allowed.).     Results for orders placed during the hospital encounter of 07/24/23    Echo    Interpretation Summary  · The left ventricle is normal in size with concentric hypertrophy and low normal systolic function.  · Mild left atrial enlargement.  · The estimated ejection fraction is 50%.  · Normal left ventricular diastolic function.  · Normal right ventricular size with normal right ventricular systolic function.  · Mild mitral regurgitation.  · Mild tricuspid regurgitation.  · Intermediate central venous pressure (8 mmHg).  · The estimated PA systolic pressure is 31 mmHg.     Results for orders placed during the hospital encounter of 07/24/23    Nuclear Stress - Cardiology Interpreted    Interpretation Summary    Normal myocardial perfusion scan. There is no evidence of myocardial ischemia or infarction.    There is a mild to moderate intensity perfusion abnormality in the anterior and anteroseptal wall of the left ventricle, secondary to breast attenuation.    There is a mild intensity fixed perfusion abnormality in the  wall of the left ventricle, secondary to soft tissue attenuation.    The gated perfusion images showed an ejection fraction of 62% at rest. The gated perfusion images showed an ejection fraction of 64% post stress. Normal ejection fraction is greater than 59%.    There is normal wall motion at rest and post stress.    The ECG portion of the study is negative for ischemia.     Assessment:      1. Severe obesity (BMI 35.0-39.9) with comorbidity    2. Primary hypertension    3. Claudication    4. Atypical chest pain        Plan:   Severe obesity (BMI 35.0-39.9) with comorbidity    Primary hypertension    Claudication    Atypical chest pain      SED, CRP today  Cont amlodipine, metoprolol-HTN  Statin-HLD    RTC 3 mos    April Koroma, FNP-C [facility], Cardiology

## 2023-08-02 ENCOUNTER — OFFICE VISIT (OUTPATIENT)
Dept: PRIMARY CARE CLINIC | Facility: CLINIC | Age: 48
End: 2023-08-02
Payer: COMMERCIAL

## 2023-08-02 ENCOUNTER — TELEPHONE (OUTPATIENT)
Dept: CARDIOLOGY | Facility: CLINIC | Age: 48
End: 2023-08-02
Payer: COMMERCIAL

## 2023-08-02 ENCOUNTER — LAB VISIT (OUTPATIENT)
Dept: LAB | Facility: HOSPITAL | Age: 48
End: 2023-08-02
Attending: FAMILY MEDICINE
Payer: COMMERCIAL

## 2023-08-02 ENCOUNTER — PATIENT OUTREACH (OUTPATIENT)
Dept: ADMINISTRATIVE | Facility: OTHER | Age: 48
End: 2023-08-02
Payer: COMMERCIAL

## 2023-08-02 VITALS
TEMPERATURE: 97 F | WEIGHT: 186 LBS | DIASTOLIC BLOOD PRESSURE: 74 MMHG | HEIGHT: 60 IN | OXYGEN SATURATION: 97 % | HEART RATE: 92 BPM | BODY MASS INDEX: 36.52 KG/M2 | SYSTOLIC BLOOD PRESSURE: 118 MMHG

## 2023-08-02 DIAGNOSIS — Z13.1 SCREENING FOR DIABETES MELLITUS: ICD-10-CM

## 2023-08-02 DIAGNOSIS — Z86.39 HISTORY OF METABOLIC AND NUTRITIONAL DISORDER: ICD-10-CM

## 2023-08-02 DIAGNOSIS — R06.02 SHORTNESS OF BREATH: ICD-10-CM

## 2023-08-02 DIAGNOSIS — Z12.11 COLON CANCER SCREENING: ICD-10-CM

## 2023-08-02 DIAGNOSIS — I10 ESSENTIAL HYPERTENSION: ICD-10-CM

## 2023-08-02 DIAGNOSIS — Z00.00 GENERAL MEDICAL EXAM: ICD-10-CM

## 2023-08-02 DIAGNOSIS — K21.9 GASTROESOPHAGEAL REFLUX DISEASE, UNSPECIFIED WHETHER ESOPHAGITIS PRESENT: Chronic | ICD-10-CM

## 2023-08-02 DIAGNOSIS — I10 ESSENTIAL HYPERTENSION: Primary | ICD-10-CM

## 2023-08-02 DIAGNOSIS — R06.83 SNORING: ICD-10-CM

## 2023-08-02 DIAGNOSIS — R40.0 DAYTIME SLEEPINESS: ICD-10-CM

## 2023-08-02 DIAGNOSIS — Z23 NEED FOR TDAP VACCINATION: ICD-10-CM

## 2023-08-02 DIAGNOSIS — Z09 HOSPITAL DISCHARGE FOLLOW-UP: ICD-10-CM

## 2023-08-02 LAB
ALBUMIN SERPL BCP-MCNC: 4.2 G/DL (ref 3.5–5.2)
ALP SERPL-CCNC: 55 U/L (ref 55–135)
ALT SERPL W/O P-5'-P-CCNC: 13 U/L (ref 10–44)
ANION GAP SERPL CALC-SCNC: 9 MMOL/L (ref 8–16)
AST SERPL-CCNC: 15 U/L (ref 10–40)
BASOPHILS # BLD AUTO: 0.03 K/UL (ref 0–0.2)
BASOPHILS NFR BLD: 0.4 % (ref 0–1.9)
BILIRUB SERPL-MCNC: 0.3 MG/DL (ref 0.1–1)
BUN SERPL-MCNC: 10 MG/DL (ref 6–20)
CALCIUM SERPL-MCNC: 9.7 MG/DL (ref 8.7–10.5)
CHLORIDE SERPL-SCNC: 107 MMOL/L (ref 95–110)
CHOLEST SERPL-MCNC: 140 MG/DL (ref 120–199)
CHOLEST/HDLC SERPL: 4.1 {RATIO} (ref 2–5)
CO2 SERPL-SCNC: 23 MMOL/L (ref 23–29)
CREAT SERPL-MCNC: 0.8 MG/DL (ref 0.5–1.4)
DIFFERENTIAL METHOD: ABNORMAL
EOSINOPHIL # BLD AUTO: 0.1 K/UL (ref 0–0.5)
EOSINOPHIL NFR BLD: 1.4 % (ref 0–8)
ERYTHROCYTE [DISTWIDTH] IN BLOOD BY AUTOMATED COUNT: 14.5 % (ref 11.5–14.5)
EST. GFR  (NO RACE VARIABLE): >60 ML/MIN/1.73 M^2
ESTIMATED AVG GLUCOSE: 111 MG/DL (ref 68–131)
GLUCOSE SERPL-MCNC: 79 MG/DL (ref 70–110)
HBA1C MFR BLD: 5.5 % (ref 4–5.6)
HCT VFR BLD AUTO: 40.5 % (ref 37–48.5)
HDLC SERPL-MCNC: 34 MG/DL (ref 40–75)
HDLC SERPL: 24.3 % (ref 20–50)
HGB BLD-MCNC: 12.7 G/DL (ref 12–16)
IMM GRANULOCYTES # BLD AUTO: 0.01 K/UL (ref 0–0.04)
IMM GRANULOCYTES NFR BLD AUTO: 0.1 % (ref 0–0.5)
LDLC SERPL CALC-MCNC: 91.2 MG/DL (ref 63–159)
LYMPHOCYTES # BLD AUTO: 2.4 K/UL (ref 1–4.8)
LYMPHOCYTES NFR BLD: 28.4 % (ref 18–48)
MCH RBC QN AUTO: 29.7 PG (ref 27–31)
MCHC RBC AUTO-ENTMCNC: 31.4 G/DL (ref 32–36)
MCV RBC AUTO: 95 FL (ref 82–98)
MONOCYTES # BLD AUTO: 0.5 K/UL (ref 0.3–1)
MONOCYTES NFR BLD: 5.9 % (ref 4–15)
NEUTROPHILS # BLD AUTO: 5.3 K/UL (ref 1.8–7.7)
NEUTROPHILS NFR BLD: 63.8 % (ref 38–73)
NONHDLC SERPL-MCNC: 106 MG/DL
NRBC BLD-RTO: 0 /100 WBC
PLATELET # BLD AUTO: 311 K/UL (ref 150–450)
PMV BLD AUTO: 11 FL (ref 9.2–12.9)
POTASSIUM SERPL-SCNC: 3.8 MMOL/L (ref 3.5–5.1)
PROT SERPL-MCNC: 7.7 G/DL (ref 6–8.4)
RBC # BLD AUTO: 4.27 M/UL (ref 4–5.4)
SODIUM SERPL-SCNC: 139 MMOL/L (ref 136–145)
TRIGL SERPL-MCNC: 74 MG/DL (ref 30–150)
TSH SERPL DL<=0.005 MIU/L-ACNC: 0.74 UIU/ML (ref 0.4–4)
TSH SERPL DL<=0.005 MIU/L-ACNC: 0.74 UIU/ML (ref 0.4–4)
WBC # BLD AUTO: 8.32 K/UL (ref 3.9–12.7)

## 2023-08-02 PROCEDURE — 3074F PR MOST RECENT SYSTOLIC BLOOD PRESSURE < 130 MM HG: ICD-10-PCS | Mod: CPTII,S$GLB,, | Performed by: NURSE PRACTITIONER

## 2023-08-02 PROCEDURE — 3078F PR MOST RECENT DIASTOLIC BLOOD PRESSURE < 80 MM HG: ICD-10-PCS | Mod: CPTII,S$GLB,, | Performed by: NURSE PRACTITIONER

## 2023-08-02 PROCEDURE — 3078F DIAST BP <80 MM HG: CPT | Mod: CPTII,S$GLB,, | Performed by: NURSE PRACTITIONER

## 2023-08-02 PROCEDURE — 90471 TDAP VACCINE GREATER THAN OR EQUAL TO 7YO IM: ICD-10-PCS | Mod: S$GLB,,, | Performed by: NURSE PRACTITIONER

## 2023-08-02 PROCEDURE — 80061 LIPID PANEL: CPT | Performed by: FAMILY MEDICINE

## 2023-08-02 PROCEDURE — 3008F PR BODY MASS INDEX (BMI) DOCUMENTED: ICD-10-PCS | Mod: CPTII,S$GLB,, | Performed by: NURSE PRACTITIONER

## 2023-08-02 PROCEDURE — 3074F SYST BP LT 130 MM HG: CPT | Mod: CPTII,S$GLB,, | Performed by: NURSE PRACTITIONER

## 2023-08-02 PROCEDURE — 80053 COMPREHEN METABOLIC PANEL: CPT | Performed by: FAMILY MEDICINE

## 2023-08-02 PROCEDURE — 1160F RVW MEDS BY RX/DR IN RCRD: CPT | Mod: CPTII,S$GLB,, | Performed by: NURSE PRACTITIONER

## 2023-08-02 PROCEDURE — 99999 PR PBB SHADOW E&M-EST. PATIENT-LVL V: ICD-10-PCS | Mod: PBBFAC,,, | Performed by: NURSE PRACTITIONER

## 2023-08-02 PROCEDURE — 3044F HG A1C LEVEL LT 7.0%: CPT | Mod: CPTII,S$GLB,, | Performed by: NURSE PRACTITIONER

## 2023-08-02 PROCEDURE — 99213 PR OFFICE/OUTPT VISIT, EST, LEVL III, 20-29 MIN: ICD-10-PCS | Mod: 25,S$GLB,, | Performed by: NURSE PRACTITIONER

## 2023-08-02 PROCEDURE — 84443 ASSAY THYROID STIM HORMONE: CPT | Performed by: FAMILY MEDICINE

## 2023-08-02 PROCEDURE — 90471 IMMUNIZATION ADMIN: CPT | Mod: S$GLB,,, | Performed by: NURSE PRACTITIONER

## 2023-08-02 PROCEDURE — 85025 COMPLETE CBC W/AUTO DIFF WBC: CPT | Performed by: FAMILY MEDICINE

## 2023-08-02 PROCEDURE — 1159F MED LIST DOCD IN RCRD: CPT | Mod: CPTII,S$GLB,, | Performed by: NURSE PRACTITIONER

## 2023-08-02 PROCEDURE — 90715 TDAP VACCINE 7 YRS/> IM: CPT | Mod: S$GLB,,, | Performed by: NURSE PRACTITIONER

## 2023-08-02 PROCEDURE — 99213 OFFICE O/P EST LOW 20 MIN: CPT | Mod: 25,S$GLB,, | Performed by: NURSE PRACTITIONER

## 2023-08-02 PROCEDURE — 90715 TDAP VACCINE GREATER THAN OR EQUAL TO 7YO IM: ICD-10-PCS | Mod: S$GLB,,, | Performed by: NURSE PRACTITIONER

## 2023-08-02 PROCEDURE — 3044F PR MOST RECENT HEMOGLOBIN A1C LEVEL <7.0%: ICD-10-PCS | Mod: CPTII,S$GLB,, | Performed by: NURSE PRACTITIONER

## 2023-08-02 PROCEDURE — 1160F PR REVIEW ALL MEDS BY PRESCRIBER/CLIN PHARMACIST DOCUMENTED: ICD-10-PCS | Mod: CPTII,S$GLB,, | Performed by: NURSE PRACTITIONER

## 2023-08-02 PROCEDURE — 1159F PR MEDICATION LIST DOCUMENTED IN MEDICAL RECORD: ICD-10-PCS | Mod: CPTII,S$GLB,, | Performed by: NURSE PRACTITIONER

## 2023-08-02 PROCEDURE — 83036 HEMOGLOBIN GLYCOSYLATED A1C: CPT | Performed by: FAMILY MEDICINE

## 2023-08-02 PROCEDURE — 36415 COLL VENOUS BLD VENIPUNCTURE: CPT | Mod: PN | Performed by: FAMILY MEDICINE

## 2023-08-02 PROCEDURE — 3008F BODY MASS INDEX DOCD: CPT | Mod: CPTII,S$GLB,, | Performed by: NURSE PRACTITIONER

## 2023-08-02 PROCEDURE — 99999 PR PBB SHADOW E&M-EST. PATIENT-LVL V: CPT | Mod: PBBFAC,,, | Performed by: NURSE PRACTITIONER

## 2023-08-02 NOTE — PROGRESS NOTES
CHW - Initial Contact    This Community Health Worker completed the Social Determinant of Health questionnaire with patient during clinic visit today.    Pt identified barriers of most importance are none at this time.   Referrals to community agencies completed with patient consent outside of [facility] include: No outside referral at this time.  Referrals were put through [facility] - no  Support and Services: None  Other information discussed the patient needs help with: No other information was discussed at this time.   Follow up required: No  No future outreach task assigned  SDOH was done on this patient she do not need any help at this time.

## 2023-08-02 NOTE — PROGRESS NOTES
Subjective:       Patient ID: Dalia Ozuna is a 47 y.o. female.    Chief Complaint: Annual Exam and Hypertension      History of Present Illness:   Dalia Ozuna 47 y.o. female presents today with resents today for hospital follow up. Patient was hospitalized from 7/24/23 to 7/26/2023 with chest pains. Patient had appointment with cardiology and labs/stress test/echo were normal and she was released to return to work with no restrictions. Patient reports shortness of breath when she sleeps and has been told she snores loudly. Additionally, she reports frequent shortness of breath through out the day and is concerned since she works around hazardous chemicals. Will refer to Pulmonology and for Sleep Study. Encouraged patient to keep follow up appointment and to report back to ER if symptoms return or worsen. Treatment options and alternatives were discussed with the patient. Patient provided opportunity to ask additional questions.  All questions were answered. Voices understanding and acceptance of this advice. Instructed to call back if any further questions or concerns.    RESULT SUMMARY:  6 points  STOP-BANG    High   Risk for moderate to severe AGUSTIN      INPUTS:  Do you snore loudly? --> 1 = Yes  Do you often feel tired, fatigued, or sleepy during the daytime? --> 1 = Yes  Has anyone observed you stop breathing during sleep? --> 1 = Yes  Do you have (or are you being treated for) high blood pressure? --> 1 = Yes  BMI --> 1 = >35 kg/m²  Age --> 1 = >50 years  Neck circumference --> 0 = ?40 cm  Gender --> 0 = Female      Past Medical History:   Diagnosis Date    Essential hypertension     Pneumonia      Family History   Problem Relation Age of Onset    Breast cancer Maternal Grandmother      Social History     Socioeconomic History    Marital status:    Tobacco Use    Smoking status: Every Day     Current packs/day: 2.00     Average packs/day: 2.0 packs/day for 26.1 years (52.1 ttl pk-yrs)      Types: Cigarettes     Start date: 5/1/1998    Smokeless tobacco: Never    Tobacco comments:     currently taking chantix    Substance and Sexual Activity    Alcohol use: Not Currently    Drug use: Never    Sexual activity: Yes     Partners: Male     Social Determinants of Health     Financial Resource Strain: Low Risk  (8/2/2023)    Overall Financial Resource Strain (CARDIA)     Difficulty of Paying Living Expenses: Not hard at all   Recent Concern: Financial Resource Strain - High Risk (7/25/2023)    Overall Financial Resource Strain (CARDIA)     Difficulty of Paying Living Expenses: Very hard   Food Insecurity: No Food Insecurity (8/2/2023)    Hunger Vital Sign     Worried About Running Out of Food in the Last Year: Never true     Ran Out of Food in the Last Year: Never true   Recent Concern: Food Insecurity - Food Insecurity Present (7/25/2023)    Hunger Vital Sign     Worried About Running Out of Food in the Last Year: Often true     Ran Out of Food in the Last Year: Often true   Transportation Needs: No Transportation Needs (8/2/2023)    PRAPARE - Transportation     Lack of Transportation (Medical): No     Lack of Transportation (Non-Medical): No   Physical Activity: Inactive (8/2/2023)    Exercise Vital Sign     Days of Exercise per Week: 0 days     Minutes of Exercise per Session: 0 min   Stress: Stress Concern Present (8/2/2023)    [employer] [place] [employer] - Occupational Stress Questionnaire     Feeling of Stress : Rather much   Social Connections: Moderately Isolated (8/2/2023)    Social Connection and Isolation Panel [NHANES]     Frequency of Communication with Friends and Family: More than three times a week     Frequency of Social Gatherings with Friends and Family: More than three times a week     Attends Gnosticist Services: More than 4 times per year     Active Member of Clubs or Organizations: No     Attends Club or Organization Meetings: Never     Marital Status:    Housing  Stability: Low Risk  (8/2/2023)    Housing Stability Vital Sign     Unable to Pay for Housing in the Last Year: No     Number of Places Lived in the Last Year: 1     Unstable Housing in the Last Year: No   Recent Concern: Housing Stability - High Risk (7/25/2023)    Housing Stability Vital Sign     Unable to Pay for Housing in the Last Year: Yes     Number of Places Lived in the Last Year: 1     Unstable Housing in the Last Year: Yes     Outpatient Encounter Medications as of 8/2/2023   Medication Sig Dispense Refill    albuterol (PROVENTIL/VENTOLIN HFA) 90 mcg/actuation inhaler Inhale 2 puffs into the lungs every 4 (four) hours as needed for Wheezing. 18 g 3    amLODIPine (NORVASC) 10 MG tablet Take 1 tablet (10 mg total) by mouth once daily. 30 tablet 3    atorvastatin (LIPITOR) 40 MG tablet Take 1 tablet (40 mg total) by mouth every evening. 90 tablet 1    EScitalopram oxalate (LEXAPRO) 10 MG tablet Take 1 tablet (10 mg total) by mouth once daily. 30 tablet 3    ibuprofen (ADVIL,MOTRIN) 800 MG tablet Take 1 tablet (800 mg total) by mouth every 8 (eight) hours as needed for Pain. 30 tablet 0    metoprolol tartrate (LOPRESSOR) 25 MG tablet Take 1 tablet (25 mg total) by mouth 2 (two) times daily. 60 tablet 2    nicotine (NICODERM CQ) 21 mg/24 hr Place 1 patch onto the skin once daily. 28 patch 1    ondansetron (ZOFRAN-ODT) 4 MG TbDL Take 1 tablet (4 mg total) by mouth every 6 (six) hours as needed (Nausea). 20 tablet 0    pantoprazole (PROTONIX) 40 MG tablet Take 1 tablet (40 mg total) by mouth once daily. 30 tablet 2    tiZANidine (ZANAFLEX) 4 MG tablet Take 4 mg by mouth every 8 (eight) hours.      traMADoL (ULTRAM) 50 mg tablet Take 1 tablet (50 mg total) by mouth every 8 (eight) hours as needed for Pain. 10 tablet 0     No facility-administered encounter medications on file as of 8/2/2023.       Review of Systems   Constitutional:  Negative for activity change, appetite change, fatigue and fever.   HENT:   Negative for congestion, ear discharge, ear pain, mouth sores, nosebleeds, sore throat and tinnitus.    Eyes:  Negative for discharge, redness and itching.   Respiratory:  Positive for shortness of breath. Negative for apnea and cough.    Cardiovascular:  Negative for chest pain and leg swelling.   Gastrointestinal:  Negative for abdominal distention, abdominal pain, blood in stool and constipation.   Endocrine: Negative for polydipsia, polyphagia and polyuria.   Genitourinary:  Negative for difficulty urinating, flank pain, frequency and hematuria.   Musculoskeletal:  Negative for back pain, gait problem, neck pain and neck stiffness.   Skin:  Negative for rash and wound.   Allergic/Immunologic: Negative for environmental allergies, food allergies and immunocompromised state.   Neurological:  Negative for dizziness, seizures, syncope, numbness and headaches.   Hematological:  Negative for adenopathy. Does not bruise/bleed easily.   Psychiatric/Behavioral:  Negative for agitation, confusion, hallucinations, self-injury and suicidal ideas. The patient is nervous/anxious.        Objective:      /74 (BP Location: Left arm, Patient Position: Sitting, BP Method: Medium (Manual))   Pulse 92   Temp 97.3 °F (36.3 °C) (Oral)   Ht 5' (1.524 m)   Wt 84.4 kg (186 lb)   LMP  (LMP Unknown)   SpO2 97%   BMI 36.33 kg/m²   Physical Exam  Vitals and nursing note reviewed.   Constitutional:       General: She is not in acute distress.     Appearance: Normal appearance. She is obese. She is not ill-appearing or toxic-appearing.   Cardiovascular:      Rate and Rhythm: Normal rate and regular rhythm.      Pulses: Normal pulses.      Heart sounds: Normal heart sounds.   Pulmonary:      Effort: Pulmonary effort is normal.      Breath sounds: Normal breath sounds.   Neurological:      Mental Status: She is alert.       Results for orders placed or performed in visit on 08/01/23   C-REACTIVE PROTEIN   Result Value Ref Range     CRP 8.1 0.0 - 8.2 mg/L   Sedimentation rate   Result Value Ref Range    Sed Rate 15 0 - 36 mm/Hr     Assessment:       1. Essential hypertension    2. Shortness of breath    3. Colon cancer screening    4. Daytime sleepiness    5. Snoring    6. Gastroesophageal reflux disease, unspecified whether esophagitis present    7. General medical exam    8. Screening for diabetes mellitus    9. History of metabolic and nutritional disorder    10. Need for Tdap vaccination    11. Hospital discharge follow-up        Plan:   Dalia was seen today for annual exam and hypertension.    Diagnoses and all orders for this visit:    Essential hypertension  -     TSH; Future    Shortness of breath  -     Ambulatory referral/consult to Pulmonology; Future  -     Ambulatory referral/consult to Sleep Disorders; Future    Colon cancer screening    Daytime sleepiness  -     Ambulatory referral/consult to Sleep Disorders; Future    Snoring    Gastroesophageal reflux disease, unspecified whether esophagitis present    General medical exam    Screening for diabetes mellitus  -     TSH; Future    History of metabolic and nutritional disorder  -     TSH; Future    Need for Tdap vaccination    Hospital discharge follow-up    Other orders  -     Tdap Vaccine       Transitional Care Note    Family and/or Caretaker present at visit?  Yes.  Diagnostic tests reviewed/disposition: No diagnosic tests pending after this hospitalization.  Disease/illness education: None   Home health/community services discussion/referrals: Patient does not have home health established from hospital visit.  They do not need home health.  If needed, we will set up home health for the patient.   Establishment or re-establishment of referral orders for community resources: No other necessary community resources.   Discussion with other health care providers: No discussion with other health care providers necessary.             [facility]    [address]  [place] [address]  Office [phone]  Fax [phone]

## 2023-08-02 NOTE — LETTER
August 2, 2023      [facility]  [address]   [facility] [id number]       Patient: Dalia Ozuna   YOB: 1975  Date of Visit: 08/02/2023    To Whom It May Concern:    Kervin Ozuna  was at [facility] on 08/02/2023. The patient may return to work/school on 08/10/2023 with restrictions pending clearance from pulmonologist and sleep disorder clinic. f you have any questions or concerns, or if I can be of further assistance, please do not hesitate to contact me.    Sincerely,      Juan Jose Sandoval, DNP

## 2023-08-02 NOTE — TELEPHONE ENCOUNTER
Pt wanting lab results interpreted - CRP and sedimentation rate from 8/1 lab - both WNL but pt still has chest pain  Please advise      ----- Message from Deepti Haile sent at 8/2/2023  1:49 PM CDT -----  Type:  Patient Returning Call    Who Called:pt  Who Left Message for Patient:nurse  Does the patient know what this is regarding?:lab results  Would the patient rather a call back or a response via [facility]? Call   Best Call Back Number:[phone]  Additional Information: .    Thank you

## 2023-08-03 ENCOUNTER — LAB VISIT (OUTPATIENT)
Dept: LAB | Facility: HOSPITAL | Age: 48
End: 2023-08-03
Attending: FAMILY MEDICINE
Payer: COMMERCIAL

## 2023-08-03 ENCOUNTER — TELEPHONE (OUTPATIENT)
Dept: CARDIOLOGY | Facility: CLINIC | Age: 48
End: 2023-08-03
Payer: COMMERCIAL

## 2023-08-03 DIAGNOSIS — R07.89 ATYPICAL CHEST PAIN: ICD-10-CM

## 2023-08-03 DIAGNOSIS — R07.89 ATYPICAL CHEST PAIN: Primary | ICD-10-CM

## 2023-08-03 LAB — D DIMER PPP IA.FEU-MCNC: 0.41 MG/L FEU

## 2023-08-03 PROCEDURE — 85379 FIBRIN DEGRADATION QUANT: CPT

## 2023-08-03 PROCEDURE — 36415 COLL VENOUS BLD VENIPUNCTURE: CPT | Mod: PN

## 2023-08-03 NOTE — TELEPHONE ENCOUNTER
Contacted pt and informed her inflammatory markers were normal. Can try taking NSAIDs to see if pain improves. Recommend her see her PCP as well. If pain worsening please go to ED. I also informed her that we will call her once we get the rest of her labs. Pt vu w/o q/c      ----- Message from April Koroma NP sent at 8/3/2023  8:52 AM CDT -----  Please phone patient and let her know her inflammatory markers were normal. Can try taking NSAIDs to see if pain improves. Recommend her see her PCP as well. If pain worsening please go to ED.

## 2023-08-03 NOTE — PROGRESS NOTES
I have reviewed all your lab results.   Blood count, kidney function, liver function, electrolytes, cholesterol and thyroid function are all normal.  Your A1C is normal, therefore you do not have diabetes.    Please do not hesitate to contact us if you have any questions.

## 2023-08-10 ENCOUNTER — TELEPHONE (OUTPATIENT)
Dept: PRIMARY CARE CLINIC | Facility: CLINIC | Age: 48
End: 2023-08-10
Payer: COMMERCIAL

## 2023-08-17 ENCOUNTER — PATIENT MESSAGE (OUTPATIENT)
Dept: ADMINISTRATIVE | Facility: HOSPITAL | Age: 48
End: 2023-08-17
Payer: COMMERCIAL

## 2023-08-17 ENCOUNTER — TELEPHONE (OUTPATIENT)
Dept: SMOKING CESSATION | Facility: CLINIC | Age: 48
End: 2023-08-17
Payer: COMMERCIAL

## 2023-08-17 NOTE — TELEPHONE ENCOUNTER
Call to patient regarding scheduled 1 pm intake appointment. Patient states she forgot & asked to be rescheduled. Patient asked CTTS questions about the program & about how would it benefit her & she shared her past experiences with trying to quit & how she was unsuccessful. CTTS answered patient's questions & provided her with literature via email & text & rescheduled for 9/7/2023 at 11:00 am.

## 2023-08-21 ENCOUNTER — PATIENT MESSAGE (OUTPATIENT)
Dept: PRIMARY CARE CLINIC | Facility: CLINIC | Age: 48
End: 2023-08-21
Payer: COMMERCIAL

## 2023-08-24 ENCOUNTER — PATIENT MESSAGE (OUTPATIENT)
Dept: PRIMARY CARE CLINIC | Facility: CLINIC | Age: 48
End: 2023-08-24
Payer: COMMERCIAL

## 2023-09-06 ENCOUNTER — TELEPHONE (OUTPATIENT)
Dept: PRIMARY CARE CLINIC | Facility: CLINIC | Age: 48
End: 2023-09-06
Payer: COMMERCIAL

## 2023-09-06 ENCOUNTER — PATIENT MESSAGE (OUTPATIENT)
Dept: PRIMARY CARE CLINIC | Facility: CLINIC | Age: 48
End: 2023-09-06
Payer: COMMERCIAL

## 2023-09-06 DIAGNOSIS — M62.838 OTHER MUSCLE SPASM: ICD-10-CM

## 2023-09-06 NOTE — TELEPHONE ENCOUNTER
Called to inform patient physician statement form has been faxed to insurance. She voiced understanding.

## 2023-09-07 ENCOUNTER — TELEPHONE (OUTPATIENT)
Dept: SMOKING CESSATION | Facility: CLINIC | Age: 48
End: 2023-09-07
Payer: COMMERCIAL

## 2023-09-07 RX ORDER — TIZANIDINE 4 MG/1
4 TABLET ORAL EVERY 8 HOURS
Qty: 90 TABLET | Refills: 1 | Status: SHIPPED | OUTPATIENT
Start: 2023-09-07

## 2023-09-07 NOTE — TELEPHONE ENCOUNTER
Call to patient for scheduled 11 am intake appointment. Patient states she is [place] town on [facility] & has a lot going on. Requests reschedule for 9/14/2023 at 11:00 am.

## 2023-09-14 ENCOUNTER — TELEPHONE (OUTPATIENT)
Dept: SMOKING CESSATION | Facility: CLINIC | Age: 48
End: 2023-09-14
Payer: COMMERCIAL

## 2023-09-14 ENCOUNTER — PATIENT MESSAGE (OUTPATIENT)
Dept: SMOKING CESSATION | Facility: CLINIC | Age: 48
End: 2023-09-14
Payer: COMMERCIAL

## 2023-09-14 NOTE — TELEPHONE ENCOUNTER
Call to patient for scheduled 11 am intake appointment. Patient asked if she could call back on another Thursday. Notified patient of the  number that she can call when she is ready to set up an appointment. Number provided via text & [employer]

## 2023-09-26 ENCOUNTER — PATIENT MESSAGE (OUTPATIENT)
Dept: PRIMARY CARE CLINIC | Facility: CLINIC | Age: 48
End: 2023-09-26
Payer: COMMERCIAL

## 2023-10-10 ENCOUNTER — TELEPHONE (OUTPATIENT)
Dept: PULMONOLOGY | Facility: CLINIC | Age: 48
End: 2023-10-10
Payer: COMMERCIAL

## 2023-10-13 ENCOUNTER — OFFICE VISIT (OUTPATIENT)
Dept: PULMONOLOGY | Facility: CLINIC | Age: 48
End: 2023-10-13
Payer: MEDICAID

## 2023-10-13 VITALS
HEART RATE: 84 BPM | RESPIRATION RATE: 17 BRPM | OXYGEN SATURATION: 98 % | HEIGHT: 60 IN | DIASTOLIC BLOOD PRESSURE: 80 MMHG | SYSTOLIC BLOOD PRESSURE: 112 MMHG | BODY MASS INDEX: 36.79 KG/M2 | WEIGHT: 187.38 LBS

## 2023-10-13 DIAGNOSIS — E66.01 SEVERE OBESITY (BMI 35.0-39.9) WITH COMORBIDITY: ICD-10-CM

## 2023-10-13 DIAGNOSIS — R06.09 DYSPNEA ON EXERTION: ICD-10-CM

## 2023-10-13 DIAGNOSIS — F17.210 NICOTINE DEPENDENCE, CIGARETTES, UNCOMPLICATED: ICD-10-CM

## 2023-10-13 DIAGNOSIS — G47.30 SLEEP-DISORDERED BREATHING: Primary | ICD-10-CM

## 2023-10-13 DIAGNOSIS — R06.02 SHORTNESS OF BREATH: ICD-10-CM

## 2023-10-13 PROBLEM — F17.200 SMOKER: Status: RESOLVED | Noted: 2021-05-10 | Resolved: 2023-10-13

## 2023-10-13 PROCEDURE — 3044F HG A1C LEVEL LT 7.0%: CPT | Mod: CPTII,S$GLB,, | Performed by: HOSPITALIST

## 2023-10-13 PROCEDURE — 1159F PR MEDICATION LIST DOCUMENTED IN MEDICAL RECORD: ICD-10-PCS | Mod: CPTII,S$GLB,, | Performed by: HOSPITALIST

## 2023-10-13 PROCEDURE — 3008F PR BODY MASS INDEX (BMI) DOCUMENTED: ICD-10-PCS | Mod: CPTII,S$GLB,, | Performed by: HOSPITALIST

## 2023-10-13 PROCEDURE — 3044F PR MOST RECENT HEMOGLOBIN A1C LEVEL <7.0%: ICD-10-PCS | Mod: CPTII,S$GLB,, | Performed by: HOSPITALIST

## 2023-10-13 PROCEDURE — 1160F RVW MEDS BY RX/DR IN RCRD: CPT | Mod: CPTII,S$GLB,, | Performed by: HOSPITALIST

## 2023-10-13 PROCEDURE — 3074F SYST BP LT 130 MM HG: CPT | Mod: CPTII,S$GLB,, | Performed by: HOSPITALIST

## 2023-10-13 PROCEDURE — 3074F PR MOST RECENT SYSTOLIC BLOOD PRESSURE < 130 MM HG: ICD-10-PCS | Mod: CPTII,S$GLB,, | Performed by: HOSPITALIST

## 2023-10-13 PROCEDURE — 3079F DIAST BP 80-89 MM HG: CPT | Mod: CPTII,S$GLB,, | Performed by: HOSPITALIST

## 2023-10-13 PROCEDURE — 99999 PR PBB SHADOW E&M-EST. PATIENT-LVL IV: CPT | Mod: PBBFAC,,, | Performed by: HOSPITALIST

## 2023-10-13 PROCEDURE — 1159F MED LIST DOCD IN RCRD: CPT | Mod: CPTII,S$GLB,, | Performed by: HOSPITALIST

## 2023-10-13 PROCEDURE — 3079F PR MOST RECENT DIASTOLIC BLOOD PRESSURE 80-89 MM HG: ICD-10-PCS | Mod: CPTII,S$GLB,, | Performed by: HOSPITALIST

## 2023-10-13 PROCEDURE — 3008F BODY MASS INDEX DOCD: CPT | Mod: CPTII,S$GLB,, | Performed by: HOSPITALIST

## 2023-10-13 PROCEDURE — 99214 PR OFFICE/OUTPT VISIT, EST, LEVL IV, 30-39 MIN: ICD-10-PCS | Mod: S$GLB,,, | Performed by: HOSPITALIST

## 2023-10-13 PROCEDURE — 99999 PR PBB SHADOW E&M-EST. PATIENT-LVL IV: ICD-10-PCS | Mod: PBBFAC,,, | Performed by: HOSPITALIST

## 2023-10-13 PROCEDURE — 1160F PR REVIEW ALL MEDS BY PRESCRIBER/CLIN PHARMACIST DOCUMENTED: ICD-10-PCS | Mod: CPTII,S$GLB,, | Performed by: HOSPITALIST

## 2023-10-13 PROCEDURE — 99214 OFFICE O/P EST MOD 30 MIN: CPT | Mod: S$GLB,,, | Performed by: HOSPITALIST

## 2023-10-13 RX ORDER — METHOCARBAMOL 750 MG/1
750 TABLET, FILM COATED ORAL EVERY 8 HOURS
COMMUNITY
Start: 2023-08-22

## 2023-10-13 NOTE — ASSESSMENT & PLAN NOTE
- counseled on cessation, pt is trying to cut down, but states she smokes a little more over the past couple months because she is unhappy with her body

## 2023-10-13 NOTE — PROGRESS NOTES
Subjective:      Patient ID: Dalia Ozuna is a 48 y.o. female.    Chief Complaint: Shortness of Breath and Sleep Apnea (/)    48 year old female with history of HTN, tobacco use, HLD who presents to Pulmonary clinic for evaluation of dyspnea and snoring. Mrs. Ozuna was hospitalized over the summer for chest pain (cardiac work up negative) and since that visit has been trying to be healthier. Currently walks 3 miles 2-3x/week and does yoga. Has started to try to cut down on milk and cokes. She has been snoring for several years, reports frequent episodes waking up gasping, no witnessed apneas. Dyspnea is with exertion.     AGUSTIN Evaluation:     Time Awake: 430am  Time Asleep: Gets in bed around 730/8, falls asleep around 9-930    Shift Work: No  Sleep Aids: Yes Takes Melatonin every day, sometime Tylenol PM    [place] Sleepiness Scale TOTAL = 13  (validated sleepiness questionnaire with a higher score indicating greater sleepiness; range 0-24)    STOP-Bang Questionnaire   [x] Snoring    [x]  Tired/Fatigued/Sleepy  [x] Obstruction (apneas/choking)  [x] Pressure (HTN)  [x] BMI >35  [] Age >50  [x] Neck >40 cm  [] Gender male  STOP-Bang = 6 (low risk 0-2,high risk 3-8)      Sleep position:   Supine = frequent   Non-supine = frequent    Mouth breathing during sleep:  frequent     BP Readings from Last 3 Encounters:   10/13/23 112/80   08/02/23 118/74   08/01/23 116/78         Smoking hx: 1ppd, started when she was 26 years old, at times around 3 packs per day, around 30 pack years  Environmental hx: 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 Negative for sputum production and wheezing.      Objective:     Physical Exam   Constitutional: She is oriented to person, place, and time. She appears well-developed and well-nourished. She is  obese.   Cardiovascular: Normal rate and regular rhythm.   Pulmonary/Chest: Normal expansion, effort normal and breath sounds normal.   Musculoskeletal:         General: No edema.   Neurological: She is alert and oriented to person, place, and time.   Skin: Skin is warm and dry.   Psychiatric: She has a normal mood and affect.     Personal Diagnostic Review  As Above      10/13/2023     9:41 AM 8/2/2023     9:30 AM 8/1/2023    12:00 PM 7/26/2023     7:28 AM 7/26/2023     5:29 AM 7/26/2023    12:32 AM 7/25/2023     8:14 PM   Pulmonary Function Tests   SpO2 98 % 97 % 97 % 98 % 98 % 98 % 100 %   Height 5' (1.524 m) 5' (1.524 m) 5' (1.524 m)       Weight 85 kg (187 lb 6.3 oz) 84.4 kg (186 lb) 84.3 kg (185 lb 13.6 oz)       BMI (Calculated) 36.6 36.3 36.3            Assessment:     1. Shortness of breath         Outpatient Encounter Medications as of 10/13/2023   Medication Sig Dispense Refill    albuterol (PROVENTIL/VENTOLIN HFA) 90 mcg/actuation inhaler Inhale 2 puffs into the lungs every 4 (four) hours as needed for Wheezing. 18 g 3    amLODIPine (NORVASC) 10 MG tablet Take 1 tablet (10 mg total) by mouth once daily. 30 tablet 3    atorvastatin (LIPITOR) 40 MG tablet Take 1 tablet (40 mg total) by mouth every evening. 90 tablet 1    EScitalopram oxalate (LEXAPRO) 10 MG tablet Take 1 tablet (10 mg total) by mouth once daily. 30 tablet 3    ibuprofen (ADVIL,MOTRIN) 800 MG tablet Take 1 tablet (800 mg total) by mouth every 8 (eight) hours as needed for Pain. 30 tablet 0    methocarbamoL (ROBAXIN) 750 MG Tab Take 750 mg by mouth every 8 (eight) hours.      metoprolol tartrate (LOPRESSOR) 25 MG tablet Take 1 tablet (25 mg total) by mouth 2 (two) times daily. 60 tablet 2    nicotine (NICODERM CQ) 21 mg/24 hr Place 1 patch onto the skin once daily. 28 patch 1    ondansetron (ZOFRAN-ODT) 4 MG TbDL Take 1 tablet (4 mg total) by mouth every 6 (six) hours as needed (Nausea). 20 tablet 0    pantoprazole (PROTONIX) 40 MG tablet  Take 1 tablet (40 mg total) by mouth once daily. 30 tablet 2    tiZANidine (ZANAFLEX) 4 MG tablet Take 1 tablet (4 mg total) by mouth every 8 (eight) hours. 90 tablet 1    traMADoL (ULTRAM) 50 mg tablet Take 1 tablet (50 mg total) by mouth every 8 (eight) hours as needed for Pain. 10 tablet 0     No facility-administered encounter medications on file as of 10/13/2023.     No orders of the defined types were placed in this encounter.          Plan:     Problem List Items Addressed This Visit          Cardiac/Vascular    Dyspnea on exertion     - further eval with PFT and walk  - prior cardiac work up negative           Relevant Orders    Complete PFT with bronchodilator    Stress test, pulmonary       Endocrine    Severe obesity (BMI 35.0-39.9) with comorbidity     - encouraged pt to continue diet changes and try to increase walk frequency            Other    Nicotine dependence, cigarettes, uncomplicated     - counseled on cessation, pt is trying to cut down, but states she smokes a little more over the past couple months because she is unhappy with her body           Sleep-disordered breathing - Primary     - epworth 13, stop-bang 6  - high risk  - HST ordered         Relevant Orders    Home Sleep Study     Other Visit Diagnoses       Shortness of breath              Plan discussed with pt and she expressed understanding, all questions answered. RTC 1 month for HST review, PFT and walk.

## 2023-10-18 ENCOUNTER — TELEPHONE (OUTPATIENT)
Dept: SLEEP MEDICINE | Facility: CLINIC | Age: 48
End: 2023-10-18
Payer: COMMERCIAL

## 2023-10-19 ENCOUNTER — PATIENT MESSAGE (OUTPATIENT)
Dept: PRIMARY CARE CLINIC | Facility: CLINIC | Age: 48
End: 2023-10-19
Payer: COMMERCIAL

## 2023-10-20 DIAGNOSIS — M25.511 CHRONIC RIGHT SHOULDER PAIN: ICD-10-CM

## 2023-10-20 DIAGNOSIS — I10 ESSENTIAL HYPERTENSION: Primary | ICD-10-CM

## 2023-10-20 DIAGNOSIS — Z01.419 WELL WOMAN EXAM: ICD-10-CM

## 2023-10-20 DIAGNOSIS — G89.29 CHRONIC RIGHT SHOULDER PAIN: ICD-10-CM

## 2023-10-20 DIAGNOSIS — Z12.31 BREAST CANCER SCREENING BY MAMMOGRAM: ICD-10-CM

## 2023-10-20 RX ORDER — IBUPROFEN 800 MG/1
800 TABLET ORAL EVERY 8 HOURS PRN
Qty: 15 TABLET | Refills: 0 | Status: SHIPPED | OUTPATIENT
Start: 2023-10-20 | End: 2023-11-25

## 2023-10-31 ENCOUNTER — HOSPITAL ENCOUNTER (OUTPATIENT)
Dept: SLEEP MEDICINE | Facility: HOSPITAL | Age: 48
Discharge: HOME | End: 2023-10-31
Attending: HOSPITALIST
Payer: COMMERCIAL

## 2023-10-31 DIAGNOSIS — G47.30 SLEEP-DISORDERED BREATHING: ICD-10-CM

## 2023-10-31 PROCEDURE — 95800 PR SLEEP STUDY, UNATTENDED, RECORD HEART RATE/O2 SAT/RESP ANAL/SLEEP TIME: ICD-10-PCS | Mod: 26,,, | Performed by: INTERNAL MEDICINE

## 2023-10-31 PROCEDURE — 95800 SLP STDY UNATTENDED: CPT | Mod: 26,,, | Performed by: INTERNAL MEDICINE

## 2023-10-31 NOTE — Clinical Note
PHYSICIAN INTERPRETATION AND COMMENTS: Clinically significant sleep disordered breathing is not identified,and pulse rate arousals occur very frequently. Additional testing may be indicated CLINICAL HISTORY: 48 year old female presented with: 15.25 inch neck, BMI of 35.5, an [place] sleepiness score of 11, history of hypertension, depression and symptoms of nocturnal snoring, waking up choking and witnessed apneas. Based on the clinical history, the patient has a high pre-test probability of having Severe AGUSTIN

## 2023-10-31 NOTE — PROCEDURES
"PHYSICIAN INTERPRETATION AND COMMENTS: Clinically significant sleep disordered breathing is not identified,and pulse  rate arousals occur very frequently. Additional testing may be indicated  CLINICAL HISTORY: 48 year old female presented with: 15.25 inch neck, BMI of 35.5, an [place] sleepiness score of 11,  history of hypertension, depression and symptoms of nocturnal snoring, waking up choking and witnessed apneas. Based  on the clinical history, the patient has a high pre-test probability of having Severe AGUSTIN.  SLEEP STUDY FINDINGS: Patient underwent a 1 night Home Sleep Test and by behavioral criteria, slept for approximately 5.1  hours, with a sleep latency of 6 minutes and a sleep efficiency of 74%. The patient slept supine 20.4% of the night based on  valid recording time of 5.24 hours. Snoring occurs for 3% (30 dB) of the study. The mean pulse rate is 74.2 BPM, with very  frequent pulse rate variability (70 events with >= 6 BPM increase/decrease per hour).  TREATMENT CONSIDERATIONS: The high pre-test probability is inconsistent with the AHI severity. Consider further clinical  evaluation.  DISEASE MANAGEMENT CONSIDERATIONS: None.    Farnazr Marta Duke PA-C  [id number] [place] [id number], Franci CASILLAS MD         The sleep study that you ordered is complete.  You have ordered sleep LAB services to perform the sleep study for Dalia Ozuna .      Please find Sleep Study result in  the "Media tab" of Chart Review menu.        You can look  for the report in the  Media by the document type "Sleep Study Documents". Alphabetizing  "Document type" column helps to find the SLEEP STUDY report  Faster.       As the ordering provider, you are responsible for reviewing the results and implementing a treatment plan with your patient.    If you need a Sleep Medicine provider to explain the sleep study findings and arrange treatment for the patient, please refer patient for consultation to " "our Sleep Clinic via [facility] with Ambulatory Consult Sleep.     To do that please place an order for an  "Ambulatory Consult Sleep" -  order , it will go to our clinic work queue for our staff  to contact the patient for an appointment.      For any questions, please contact our sleep lab  staff at [phone] to talk to clinical staff          Kd Mcdaniel MD   "

## 2023-11-01 DIAGNOSIS — G47.30 SLEEP-DISORDERED BREATHING: Primary | ICD-10-CM

## 2023-11-02 ENCOUNTER — OFFICE VISIT (OUTPATIENT)
Dept: PRIMARY CARE CLINIC | Facility: CLINIC | Age: 48
End: 2023-11-02
Payer: MEDICAID

## 2023-11-02 DIAGNOSIS — Z53.21 PROCEDURE AND TREATMENT NOT CARRIED OUT DUE TO PATIENT LEAVING PRIOR TO BEING SEEN BY HEALTH CARE PROVIDER: Primary | ICD-10-CM

## 2023-11-02 PROCEDURE — 99499 NO LOS: ICD-10-PCS | Mod: 95,,, | Performed by: NURSE PRACTITIONER

## 2023-11-02 PROCEDURE — 99499 UNLISTED E&M SERVICE: CPT | Mod: 95,,, | Performed by: NURSE PRACTITIONER

## 2023-11-06 ENCOUNTER — OFFICE VISIT (OUTPATIENT)
Dept: PRIMARY CARE CLINIC | Facility: CLINIC | Age: 48
End: 2023-11-06
Payer: MEDICAID

## 2023-11-06 DIAGNOSIS — N91.2 AMENORRHEA: ICD-10-CM

## 2023-11-06 DIAGNOSIS — Z12.4 CERVICAL CANCER SCREENING: ICD-10-CM

## 2023-11-06 DIAGNOSIS — I10 ESSENTIAL HYPERTENSION: Primary | ICD-10-CM

## 2023-11-06 DIAGNOSIS — Z12.11 COLON CANCER SCREENING: ICD-10-CM

## 2023-11-06 PROCEDURE — 3044F PR MOST RECENT HEMOGLOBIN A1C LEVEL <7.0%: ICD-10-PCS | Mod: CPTII,95,, | Performed by: NURSE PRACTITIONER

## 2023-11-06 PROCEDURE — 99214 PR OFFICE/OUTPT VISIT, EST, LEVL IV, 30-39 MIN: ICD-10-PCS | Mod: 95,,, | Performed by: NURSE PRACTITIONER

## 2023-11-06 PROCEDURE — 99214 OFFICE O/P EST MOD 30 MIN: CPT | Mod: 95,,, | Performed by: NURSE PRACTITIONER

## 2023-11-06 PROCEDURE — 3044F HG A1C LEVEL LT 7.0%: CPT | Mod: CPTII,95,, | Performed by: NURSE PRACTITIONER

## 2023-11-06 RX ORDER — METOPROLOL TARTRATE 25 MG/1
25 TABLET, FILM COATED ORAL 2 TIMES DAILY
Qty: 60 TABLET | Refills: 2 | Status: SHIPPED | OUTPATIENT
Start: 2023-11-06 | End: 2024-11-05

## 2023-11-06 NOTE — PROGRESS NOTES
Subjective:       Patient ID: Dalia Ozuna is a 48 y.o. female.    Chief Complaint: Medication Management and Refill and to address care care gaps.   The patient location is:[place], [place]     Visit type: audiovisual-Synchronous      Face to Face time with patient: 11 min  12 minutes of total time spent on the encounter, which includes face to face time and non-face to face time preparing to see the patient (eg, review of tests), Obtaining and/or reviewing separately obtained history, Documenting clinical information in the electronic or other health record, Independently interpreting results (not separately reported) and communicating results to the patient/family/caregiver, or Care coordination (not separately reported).         Each patient to whom he or she provides medical services by telemedicine is:  (1) informed of the relationship between the physician and patient and the respective role of any other health care provider with respect to management of the patient; and (2) notified that he or she may decline to receive medical services by telemedicine and may withdraw from such care at any time.       History of Present Illness:   Dalia Ozuna 48 y.o. female presents to clinic for medication management and refills for HTN and to address care gaps. Denies any other problems or concerns at this time. Treatment options and alternatives were discussed with the patient. Patient provided opportunity to ask additional questions.  All questions were answered. Voices understanding and acceptance of this advice. Instructed to call back if any further questions or concerns.     Past Medical History:   Diagnosis Date    Essential hypertension     Pneumonia      Family History   Problem Relation Age of Onset    Breast cancer Maternal Grandmother      Social History     Socioeconomic History    Marital status:    Tobacco Use    Smoking status: Every Day     Current packs/day: 2.00     Average packs/day:  2.0 packs/day for 26.3 years (52.6 ttl pk-yrs)     Types: Cigarettes     Start date: 5/1/1998    Smokeless tobacco: Never    Tobacco comments:     currently taking chantix    Substance and Sexual Activity    Alcohol use: Not Currently    Drug use: Never    Sexual activity: Yes     Partners: Male     Social Determinants of Health     Financial Resource Strain: Low Risk  (8/2/2023)    Overall Financial Resource Strain (CARDIA)     Difficulty of Paying Living Expenses: Not hard at all   Recent Concern: Financial Resource Strain - High Risk (7/25/2023)    Overall Financial Resource Strain (CARDIA)     Difficulty of Paying Living Expenses: Very hard   Food Insecurity: No Food Insecurity (8/2/2023)    Hunger Vital Sign     Worried About Running Out of Food in the Last Year: Never true     Ran Out of Food in the Last Year: Never true   Recent Concern: Food Insecurity - Food Insecurity Present (7/25/2023)    Hunger Vital Sign     Worried About Running Out of Food in the Last Year: Often true     Ran Out of Food in the Last Year: Often true   Transportation Needs: No Transportation Needs (8/2/2023)    PRAPARE - Transportation     Lack of Transportation (Medical): No     Lack of Transportation (Non-Medical): No   Physical Activity: Inactive (8/2/2023)    Exercise Vital Sign     Days of Exercise per Week: 0 days     Minutes of Exercise per Session: 0 min   Stress: Stress Concern Present (8/2/2023)    [employer] - Occupational Stress Questionnaire     Feeling of Stress : Rather much   Social Connections: Moderately Isolated (8/2/2023)    Social Connection and Isolation Panel [NHANES]     Frequency of Communication with Friends and Family: More than three times a week     Frequency of Social Gatherings with Friends and Family: More than three times a week     Attends Temple Services: More than 4 times per year     Active Member of Clubs or Organizations: No     Attends Club or Organization  Meetings: Never     Marital Status:    Housing Stability: Low Risk  (8/2/2023)    Housing Stability Vital Sign     Unable to Pay for Housing in the Last Year: No     Number of Places Lived in the Last Year: 1     Unstable Housing in the Last Year: No   Recent Concern: Housing Stability - High Risk (7/25/2023)    Housing Stability Vital Sign     Unable to Pay for Housing in the Last Year: Yes     Number of Places Lived in the Last Year: 1     Unstable Housing in the Last Year: Yes     Outpatient Encounter Medications as of 11/6/2023   Medication Sig Dispense Refill    albuterol (PROVENTIL/VENTOLIN HFA) 90 mcg/actuation inhaler Inhale 2 puffs into the lungs every 4 (four) hours as needed for Wheezing. 18 g 3    amLODIPine (NORVASC) 10 MG tablet Take 1 tablet (10 mg total) by mouth once daily. 30 tablet 3    atorvastatin (LIPITOR) 40 MG tablet Take 1 tablet (40 mg total) by mouth every evening. 90 tablet 1    EScitalopram oxalate (LEXAPRO) 10 MG tablet Take 1 tablet (10 mg total) by mouth once daily. 30 tablet 3    methocarbamoL (ROBAXIN) 750 MG Tab Take 750 mg by mouth every 8 (eight) hours.      metoprolol tartrate (LOPRESSOR) 25 MG tablet Take 1 tablet (25 mg total) by mouth 2 (two) times daily. 60 tablet 2    nicotine (NICODERM CQ) 21 mg/24 hr Place 1 patch onto the skin once daily. 28 patch 1    ondansetron (ZOFRAN-ODT) 4 MG TbDL Take 1 tablet (4 mg total) by mouth every 6 (six) hours as needed (Nausea). 20 tablet 0    pantoprazole (PROTONIX) 40 MG tablet Take 1 tablet (40 mg total) by mouth once daily. 30 tablet 2    tiZANidine (ZANAFLEX) 4 MG tablet Take 1 tablet (4 mg total) by mouth every 8 (eight) hours. 90 tablet 1    traMADoL (ULTRAM) 50 mg tablet Take 1 tablet (50 mg total) by mouth every 8 (eight) hours as needed for Pain. (Patient not taking: Reported on 10/13/2023) 10 tablet 0    [DISCONTINUED] metoprolol tartrate (LOPRESSOR) 25 MG tablet Take 1 tablet (25 mg total) by mouth 2 (two) times daily.  60 tablet 2     No facility-administered encounter medications on file as of 11/6/2023.       Review of Systems   Constitutional:  Positive for unexpected weight change. Negative for activity change.   HENT:  Negative for hearing loss, rhinorrhea and trouble swallowing.    Eyes:  Negative for discharge and visual disturbance.   Respiratory:  Negative for chest tightness and wheezing.    Cardiovascular:  Negative for chest pain and palpitations.   Gastrointestinal:  Negative for blood in stool, constipation, diarrhea and vomiting.   Endocrine: Negative for polydipsia and polyuria.   Genitourinary:  Positive for menstrual problem. Negative for difficulty urinating, dysuria and hematuria.   Musculoskeletal:  Negative for arthralgias, joint swelling and neck pain.   Neurological:  Negative for weakness and headaches.   Psychiatric/Behavioral:  Positive for dysphoric mood. Negative for confusion.        Objective:      There were no vitals taken for this visit.  Physical Exam  Constitutional:       Appearance: Normal appearance.   Neurological:      Mental Status: She is alert.         Results for orders placed or performed in visit on 08/03/23   D-DIMER, QUANTITATIVE   Result Value Ref Range    D-Dimer 0.41 <0.50 mg/L FEU     Assessment:       1. Essential hypertension    2. Amenorrhea    3. Colon cancer screening    4. Cervical cancer screening        Plan:   Diagnoses and all orders for this visit:    Essential hypertension    Amenorrhea  -     Ambulatory referral/consult to Gynecology; Future    Colon cancer screening  -     Fecal Immunochemical Test (iFOBT); Future    Cervical cancer screening  -     Ambulatory referral/consult to Gynecology; Future    Other orders  -     metoprolol tartrate (LOPRESSOR) 25 MG tablet; Take 1 tablet (25 mg total) by mouth 2 (two) times daily.               [facility] [address]  [place] [address]  Office [phone]  Fax  [phone]

## 2023-11-07 ENCOUNTER — OFFICE VISIT (OUTPATIENT)
Dept: CARDIOLOGY | Facility: CLINIC | Age: 48
End: 2023-11-07
Payer: MEDICAID

## 2023-11-07 ENCOUNTER — LAB VISIT (OUTPATIENT)
Dept: LAB | Facility: HOSPITAL | Age: 48
End: 2023-11-07
Payer: MEDICAID

## 2023-11-07 VITALS
BODY MASS INDEX: 36.7 KG/M2 | OXYGEN SATURATION: 98 % | DIASTOLIC BLOOD PRESSURE: 72 MMHG | SYSTOLIC BLOOD PRESSURE: 106 MMHG | HEIGHT: 60 IN | WEIGHT: 186.94 LBS | HEART RATE: 96 BPM

## 2023-11-07 DIAGNOSIS — R06.09 DYSPNEA ON EXERTION: ICD-10-CM

## 2023-11-07 DIAGNOSIS — K21.9 GASTROESOPHAGEAL REFLUX DISEASE, UNSPECIFIED WHETHER ESOPHAGITIS PRESENT: Primary | Chronic | ICD-10-CM

## 2023-11-07 DIAGNOSIS — I10 ESSENTIAL HYPERTENSION: ICD-10-CM

## 2023-11-07 DIAGNOSIS — I10 PRIMARY HYPERTENSION: ICD-10-CM

## 2023-11-07 DIAGNOSIS — I73.9 CLAUDICATION: ICD-10-CM

## 2023-11-07 DIAGNOSIS — Z13.1 SCREENING FOR DIABETES MELLITUS: ICD-10-CM

## 2023-11-07 LAB — TSH SERPL DL<=0.005 MIU/L-ACNC: 0.63 UIU/ML (ref 0.4–4)

## 2023-11-07 PROCEDURE — 99999 PR PBB SHADOW E&M-EST. PATIENT-LVL III: ICD-10-PCS | Mod: PBBFAC,,,

## 2023-11-07 PROCEDURE — 3008F BODY MASS INDEX DOCD: CPT | Mod: CPTII,,,

## 2023-11-07 PROCEDURE — 99213 OFFICE O/P EST LOW 20 MIN: CPT | Mod: PBBFAC

## 2023-11-07 PROCEDURE — 3074F SYST BP LT 130 MM HG: CPT | Mod: CPTII,,,

## 2023-11-07 PROCEDURE — 1160F PR REVIEW ALL MEDS BY PRESCRIBER/CLIN PHARMACIST DOCUMENTED: ICD-10-PCS | Mod: CPTII,,,

## 2023-11-07 PROCEDURE — 3044F HG A1C LEVEL LT 7.0%: CPT | Mod: CPTII,,,

## 2023-11-07 PROCEDURE — 1160F RVW MEDS BY RX/DR IN RCRD: CPT | Mod: CPTII,,,

## 2023-11-07 PROCEDURE — 3078F PR MOST RECENT DIASTOLIC BLOOD PRESSURE < 80 MM HG: ICD-10-PCS | Mod: CPTII,,,

## 2023-11-07 PROCEDURE — 99999 PR PBB SHADOW E&M-EST. PATIENT-LVL III: CPT | Mod: PBBFAC,,,

## 2023-11-07 PROCEDURE — 84443 ASSAY THYROID STIM HORMONE: CPT | Performed by: NURSE PRACTITIONER

## 2023-11-07 PROCEDURE — 3044F PR MOST RECENT HEMOGLOBIN A1C LEVEL <7.0%: ICD-10-PCS | Mod: CPTII,,,

## 2023-11-07 PROCEDURE — 99214 PR OFFICE/OUTPT VISIT, EST, LEVL IV, 30-39 MIN: ICD-10-PCS | Mod: S$PBB,,,

## 2023-11-07 PROCEDURE — 3074F PR MOST RECENT SYSTOLIC BLOOD PRESSURE < 130 MM HG: ICD-10-PCS | Mod: CPTII,,,

## 2023-11-07 PROCEDURE — 36415 COLL VENOUS BLD VENIPUNCTURE: CPT | Performed by: NURSE PRACTITIONER

## 2023-11-07 PROCEDURE — 1159F PR MEDICATION LIST DOCUMENTED IN MEDICAL RECORD: ICD-10-PCS | Mod: CPTII,,,

## 2023-11-07 PROCEDURE — 3078F DIAST BP <80 MM HG: CPT | Mod: CPTII,,,

## 2023-11-07 PROCEDURE — 99214 OFFICE O/P EST MOD 30 MIN: CPT | Mod: S$PBB,,,

## 2023-11-07 PROCEDURE — 1159F MED LIST DOCD IN RCRD: CPT | Mod: CPTII,,,

## 2023-11-07 PROCEDURE — 3008F PR BODY MASS INDEX (BMI) DOCUMENTED: ICD-10-PCS | Mod: CPTII,,,

## 2023-11-16 ENCOUNTER — OFFICE VISIT (OUTPATIENT)
Dept: PULMONOLOGY | Facility: CLINIC | Age: 48
End: 2023-11-16
Payer: MEDICAID

## 2023-11-16 ENCOUNTER — CLINICAL SUPPORT (OUTPATIENT)
Dept: PULMONOLOGY | Facility: CLINIC | Age: 48
End: 2023-11-16
Payer: MEDICAID

## 2023-11-16 VITALS
BODY MASS INDEX: 37.09 KG/M2 | WEIGHT: 188.94 LBS | BODY MASS INDEX: 37.09 KG/M2 | OXYGEN SATURATION: 100 % | HEIGHT: 60 IN | HEART RATE: 85 BPM | SYSTOLIC BLOOD PRESSURE: 129 MMHG | RESPIRATION RATE: 10 BRPM | HEIGHT: 60 IN | DIASTOLIC BLOOD PRESSURE: 74 MMHG | WEIGHT: 188.94 LBS

## 2023-11-16 DIAGNOSIS — F17.210 NICOTINE DEPENDENCE, CIGARETTES, UNCOMPLICATED: ICD-10-CM

## 2023-11-16 DIAGNOSIS — R06.09 DYSPNEA ON EXERTION: ICD-10-CM

## 2023-11-16 DIAGNOSIS — K21.9 GASTROESOPHAGEAL REFLUX DISEASE, UNSPECIFIED WHETHER ESOPHAGITIS PRESENT: Chronic | ICD-10-CM

## 2023-11-16 DIAGNOSIS — E66.01 SEVERE OBESITY (BMI 35.0-39.9) WITH COMORBIDITY: ICD-10-CM

## 2023-11-16 DIAGNOSIS — J45.20 MILD INTERMITTENT ASTHMA WITHOUT COMPLICATION: ICD-10-CM

## 2023-11-16 DIAGNOSIS — J98.4 SMALL AIRWAYS DISEASE: Primary | ICD-10-CM

## 2023-11-16 DIAGNOSIS — G47.30 SLEEP-DISORDERED BREATHING: ICD-10-CM

## 2023-11-16 LAB
BRPFT: NORMAL
DLCO ADJ PRE: 16.62 ML/(MIN*MMHG)
DLCO SINGLE BREATH LLN: 16.34
DLCO SINGLE BREATH PRE REF: 75.3 %
DLCO SINGLE BREATH REF: 22.07
DLCOC SBVA LLN: 3.39
DLCOC SBVA PRE REF: 90.2 %
DLCOC SBVA REF: 5.2
DLCOC SINGLE BREATH LLN: 16.34
DLCOC SINGLE BREATH PRE REF: 75.3 %
DLCOC SINGLE BREATH REF: 22.07
DLCOVA LLN: 3.39
DLCOVA PRE REF: 90.2 %
DLCOVA PRE: 4.69 ML/(MIN*MMHG*L)
DLCOVA REF: 5.2
DLVAADJ PRE: 4.69 ML/(MIN*MMHG*L)
ERV LLN: -16449.05
ERV PRE REF: 35.5 %
ERV REF: 0.95
FEF 25 75 CHG: 46.8 %
FEF 25 75 LLN: 1.16
FEF 25 75 POST REF: 70.6 %
FEF 25 75 PRE REF: 48.1 %
FEF 25 75 REF: 2.3
FET100 CHG: -15.5 %
FEV1 CHG: 7.7 %
FEV1 FVC CHG: 7.8 %
FEV1 FVC LLN: 71
FEV1 FVC POST REF: 94.3 %
FEV1 FVC PRE REF: 87.5 %
FEV1 FVC REF: 82
FEV1 LLN: 1.62
FEV1 POST REF: 89.9 %
FEV1 PRE REF: 83.5 %
FEV1 REF: 2.12
FRCPLETH LLN: 1.63
FRCPLETH PREREF: 72 %
FRCPLETH REF: 2.45
FVC CHG: -0.1 %
FVC LLN: 2
FVC POST REF: 95.2 %
FVC PRE REF: 95.3 %
FVC REF: 2.59
IVC PRE: 2.46 L
IVC SINGLE BREATH LLN: 2
IVC SINGLE BREATH PRE REF: 95.1 %
IVC SINGLE BREATH REF: 2.59
MVV LLN: 76
MVV PRE REF: 88.6 %
MVV REF: 89
PEF CHG: 18.8 %
PEF LLN: 3.92
PEF POST REF: 108 %
PEF PRE REF: 90.9 %
PEF REF: 5.68
POST FEF 25 75: 1.62 L/S
POST FET 100: 7.87 SEC
POST FEV1 FVC: 77.31 %
POST FEV1: 1.91 L
POST FVC: 2.47 L
POST PEF: 6.13 L/S
PRE DLCO: 16.62 ML/(MIN*MMHG)
PRE ERV: 0.34 L
PRE FEF 25 75: 1.1 L/S
PRE FET 100: 9.31 SEC
PRE FEV1 FVC: 71.7 %
PRE FEV1: 1.77 L
PRE FRC PL: 1.77 L
PRE FVC: 2.47 L
PRE MVV: 79 L/MIN
PRE PEF: 5.16 L/S
PRE RV: 1.51 L
PRE TLC: 4.26 L
RAW LLN: 3.06
RAW PRE REF: 133.4 %
RAW PRE: 4.08 CMH2O*S/L
RAW REF: 3.06
RV LLN: 0.93
RV PRE REF: 100.2 %
RV REF: 1.5
RVTLC LLN: 25
RVTLC PRE REF: 101.3 %
RVTLC PRE: 35.39 %
RVTLC REF: 35
TLC LLN: 3.25
TLC PRE REF: 100.3 %
TLC REF: 4.24
VA PRE: 3.54 L
VA SINGLE BREATH LLN: 4.09
VA SINGLE BREATH PRE REF: 86.6 %
VA SINGLE BREATH REF: 4.09
VC LLN: 2
VC PRE REF: 106.1 %
VC PRE: 2.75 L
VC REF: 2.59
VTGRAWPRE: 2.23 L

## 2023-11-16 PROCEDURE — 94726 PLETHYSMOGRAPHY LUNG VOLUMES: CPT | Mod: PBBFAC

## 2023-11-16 PROCEDURE — 94726 PLETHYSMOGRAPHY LUNG VOLUMES: CPT | Mod: 26,S$PBB,, | Performed by: INTERNAL MEDICINE

## 2023-11-16 PROCEDURE — 99211 OFF/OP EST MAY X REQ PHY/QHP: CPT | Mod: PBBFAC,25

## 2023-11-16 PROCEDURE — 99999 PR PBB SHADOW E&M-EST. PATIENT-LVL I: CPT | Mod: PBBFAC,,,

## 2023-11-16 PROCEDURE — 94060 EVALUATION OF WHEEZING: CPT | Mod: PBBFAC

## 2023-11-16 PROCEDURE — 94726 PULM FUNCT TST PLETHYSMOGRAP: ICD-10-PCS | Mod: 26,S$PBB,, | Performed by: INTERNAL MEDICINE

## 2023-11-16 PROCEDURE — 94729 PR C02/MEMBANE DIFFUSE CAPACITY: ICD-10-PCS | Mod: 26,S$PBB,, | Performed by: INTERNAL MEDICINE

## 2023-11-16 PROCEDURE — 3044F HG A1C LEVEL LT 7.0%: CPT | Mod: CPTII,,, | Performed by: HOSPITALIST

## 2023-11-16 PROCEDURE — 99999 PR PBB SHADOW E&M-EST. PATIENT-LVL V: CPT | Mod: PBBFAC,,, | Performed by: HOSPITALIST

## 2023-11-16 PROCEDURE — 1160F RVW MEDS BY RX/DR IN RCRD: CPT | Mod: CPTII,,, | Performed by: HOSPITALIST

## 2023-11-16 PROCEDURE — 3044F PR MOST RECENT HEMOGLOBIN A1C LEVEL <7.0%: ICD-10-PCS | Mod: CPTII,,, | Performed by: HOSPITALIST

## 2023-11-16 PROCEDURE — 1159F PR MEDICATION LIST DOCUMENTED IN MEDICAL RECORD: ICD-10-PCS | Mod: CPTII,,, | Performed by: HOSPITALIST

## 2023-11-16 PROCEDURE — 3078F DIAST BP <80 MM HG: CPT | Mod: CPTII,,, | Performed by: HOSPITALIST

## 2023-11-16 PROCEDURE — 99213 OFFICE O/P EST LOW 20 MIN: CPT | Mod: S$PBB,,, | Performed by: HOSPITALIST

## 2023-11-16 PROCEDURE — 1159F MED LIST DOCD IN RCRD: CPT | Mod: CPTII,,, | Performed by: HOSPITALIST

## 2023-11-16 PROCEDURE — 3074F SYST BP LT 130 MM HG: CPT | Mod: CPTII,,, | Performed by: HOSPITALIST

## 2023-11-16 PROCEDURE — 99213 PR OFFICE/OUTPT VISIT, EST, LEVL III, 20-29 MIN: ICD-10-PCS | Mod: S$PBB,,, | Performed by: HOSPITALIST

## 2023-11-16 PROCEDURE — 3008F BODY MASS INDEX DOCD: CPT | Mod: CPTII,,, | Performed by: HOSPITALIST

## 2023-11-16 PROCEDURE — 94729 DIFFUSING CAPACITY: CPT | Mod: 26,S$PBB,, | Performed by: INTERNAL MEDICINE

## 2023-11-16 PROCEDURE — 94618 PULMONARY STRESS TESTING: ICD-10-PCS | Mod: 26,S$PBB,, | Performed by: INTERNAL MEDICINE

## 2023-11-16 PROCEDURE — 1160F PR REVIEW ALL MEDS BY PRESCRIBER/CLIN PHARMACIST DOCUMENTED: ICD-10-PCS | Mod: CPTII,,, | Performed by: HOSPITALIST

## 2023-11-16 PROCEDURE — 99999 PR PBB SHADOW E&M-EST. PATIENT-LVL V: ICD-10-PCS | Mod: PBBFAC,,, | Performed by: HOSPITALIST

## 2023-11-16 PROCEDURE — 94729 DIFFUSING CAPACITY: CPT | Mod: PBBFAC

## 2023-11-16 PROCEDURE — 94060 EVALUATION OF WHEEZING: CPT | Mod: 26,S$PBB,59, | Performed by: INTERNAL MEDICINE

## 2023-11-16 PROCEDURE — 94618 PULMONARY STRESS TESTING: CPT | Mod: 26,S$PBB,, | Performed by: INTERNAL MEDICINE

## 2023-11-16 PROCEDURE — 3008F PR BODY MASS INDEX (BMI) DOCUMENTED: ICD-10-PCS | Mod: CPTII,,, | Performed by: HOSPITALIST

## 2023-11-16 PROCEDURE — 3078F PR MOST RECENT DIASTOLIC BLOOD PRESSURE < 80 MM HG: ICD-10-PCS | Mod: CPTII,,, | Performed by: HOSPITALIST

## 2023-11-16 PROCEDURE — 94618 PULMONARY STRESS TESTING: CPT | Mod: PBBFAC

## 2023-11-16 PROCEDURE — 99215 OFFICE O/P EST HI 40 MIN: CPT | Mod: PBBFAC,27,25 | Performed by: HOSPITALIST

## 2023-11-16 PROCEDURE — 94060 PR EVAL OF BRONCHOSPASM: ICD-10-PCS | Mod: 26,S$PBB,59, | Performed by: INTERNAL MEDICINE

## 2023-11-16 PROCEDURE — 3074F PR MOST RECENT SYSTOLIC BLOOD PRESSURE < 130 MM HG: ICD-10-PCS | Mod: CPTII,,, | Performed by: HOSPITALIST

## 2023-11-16 PROCEDURE — 99999 PR PBB SHADOW E&M-EST. PATIENT-LVL I: ICD-10-PCS | Mod: PBBFAC,,,

## 2023-11-16 RX ORDER — BUDESONIDE AND FORMOTEROL FUMARATE DIHYDRATE 160; 4.5 UG/1; UG/1
2 AEROSOL RESPIRATORY (INHALATION) EVERY 12 HOURS
Qty: 10.2 G | Refills: 5 | Status: SHIPPED | OUTPATIENT
Start: 2023-11-16 | End: 2024-11-15

## 2023-11-16 RX ORDER — ALBUTEROL SULFATE 90 UG/1
2 AEROSOL, METERED RESPIRATORY (INHALATION) EVERY 4 HOURS PRN
Qty: 18 G | Refills: 3 | Status: SHIPPED | OUTPATIENT
Start: 2023-11-16 | End: 2023-12-16

## 2023-11-16 NOTE — ASSESSMENT & PLAN NOTE
- given FEF and subjective improvement with breathing treatment, will trial symbicort and albuterol  -  referral to [facility] for inhaler education and technique

## 2023-11-16 NOTE — PATIENT INSTRUCTIONS
CPAP HABITUATION PROCEDURE   Marty Seals, Ph.D., [facility] and Miller Toney M.D.   [facility], [facility]     Some people have difficulty adjusting to CPAP/BiPAP/AutoCPAP. This is not unusual or hard to understand: Breathing with CPAP is different from ordinary breathing, and this difference is aversive to some. The problem can be overcome, however, and the benefits CPAP confers are certainly worth the effort. Below, you will find a simple and gradual way to get used to CPAP before you try to use it all night, every night. The essence of this procedure is to relax and let breathing with CPAP become a habit. It may take about 2 weeks, and involves the following:   CPAP while awake and comfortably seated, during the late evening.   CPAP in bed while attempting sleep at night.   If your discomfort is too great at any time, discontinue and attempt again later the same night, for the same amount of time.   You and your physician may alter the times and pressures if necessary.   If you find that it is very easy to get used to CPAP, you may start using it every night when you are comfortable enough to do so.  IMPORTANT REMINDER: If you have a cold or sinus congestion it is okay to miss a night or two of CPAP. Consider using antihistamines or decongestants to clear up your sinus congestion prior to sleeping.  DAYS 1-3   Start CPAP while awake and comfortably seated during the late evening, after having prepared for bed. You may do this while watching television, listening to music or reading. Use for 1 hour, then take off CPAP and go directly to bed to sleep   DAYS 4-6   Start CPAP when you go to bed and use for 1 hour, or until you fall asleep. If your discomfort is too great at any time, discontinue and attempt again later the same night, for the same designated amount of time (1 hour).   DAYS 7-9   Increase time with CPAP to 2 hours a night. If your discomfort is too great at  any time, discontinue and attempt again later the same night, for the same designated amount of time (2 hours).   DAYS 10-12   Increase time with CPAP to 3 hours a night. If your discomfort is too great at any time, discontinue and attempt again later the same night, for the same designated amount of time (3 hours).   DAYS 13-15   Sleep the entire night with CPAP.       Please call office [phone] for any questions

## 2023-11-16 NOTE — ASSESSMENT & PLAN NOTE
- pt struggling with weight and how to lose it, requested referral for help, referred to weight management program

## 2023-11-16 NOTE — PROCEDURES
[facility]-P[facility] Function 3rdFl  Six Minute Walk     SUMMARY     Ordering Provider: Marta Duke PA-C   Interpreting Provider: Kd Mcdaniel MD  Performing nurse/tech/RT: VT, RT  Diagnosis: Exertional Dyspnea  Height: 5' (152.4 cm)  Weight: 85.7 kg (188 lb 15 oz)  BMI (Calculated): 36.9   Patient Race:             Phase Oxygen Assessment Supplemental O2 Heart   Rate Blood Pressure Connie Dyspnea Scale Rating   Resting 100 % Room Air 85 bpm 129/74 4   Exercise        Minute        1 100 % Room Air 110 bpm     2 98 % Room Air 114 bpm     3 97 % Room Air 117 bpm     4 98 % Room Air 119 bpm     5 99 % Room Air 117 bpm     6  99 % Room Air 117 bpm 134/80 7-8   Recovery        Minute        1 100 % Room Air 86 bpm     2 99 % Room Air 83 bpm     3 99 % Room Air 82 bpm     4 100 % Room Air 83 bpm 127/88 3     Six Minute Walk Summary  6MWT Status: completed without stopping  Patient Reported: Dyspnea, Lightheadedness     Interpretation:  Did the patient stop or pause?: No            Total Time Walked (Calculated): 360 seconds  Final Partial Lap Distance (feet): 25 feet  Total Distance Meters (Calculated): 434.34 meters  Predicted Distance Meters (Calculated): 520.65 meters  Percentage of Predicted (Calculated): 83.42  Peak VO2 (Calculated): 17.01  Mets: 4.86  Has The Patient Had a Previous Six Minute Walk Test?: No       Previous 6MWT Results  Has The Patient Had a Previous Six Minute Walk Test?: No         CLINICAL INTERPRETATION:  Six minute walk distance is 434.34m (83.42 % predicted) with heavy dyspnea.  During exercise, there was no significant desaturation while breathing room air.  The patient reported non-pulmonary symptoms during exercise.  The patient did complete the study, walking 360 seconds of the 360 second test.  Based upon age and body mass index, exercise capacity is normal.      [] Mild exercise-induced hypoxemia described as an arterial oxygen saturation of 93-95% (or 3-4% less  than at rest)  []  Moderate exercise-induced hypoxemia as 89-93%  []  Severe exercise induced hypoxemia as < 89% O2 saturation.  Medicare Criteria for oxygen prescription comments:   []  When arterial oxygen saturation is at or below 88% during exercise (severe exercise induced hypoxemia) then the patient falls under Medicare Group 1 criteria for supplemental oxygen

## 2023-11-16 NOTE — PROGRESS NOTES
Subjective:      Patient ID: Dalia Ozuna is a 48 y.o. female.    Chief Complaint: Snoring, dyspnea, tobacco use    Interval Hx 11/16/23:    Mrs. Ozuna presents to Pulmonary clinic for follow up AGUSTIN eval as well as dyspnea. She was seen for the first and most recent time 10/13/23- at that visit a HST was ordered as well as PFT and walk.    She reports that she stopped walking as it was mostly tiring her out and she has continued to gain weight. She is back to smoking 1 ppd. Continues to have bad nocturnal reflux- on Protonix, seeing GI in December. Her PSG is scheduled for 11/20. She reports a subjective improvement in breath volume after breathing treatment.    Interim Testing:  PFT-Normal spirometry without significant bronchodilator esponse, decreased FEF 25-75% with significant post-bronchodilaot rimprovement  Walk- Resting sat 100%, SpO2 range %, walked 434m, 83% predicted  HST- AHI not consistent with patient's pre-test probability, PSG ordered and is scheduled for 11/20.    Pulmonary Interventions:  Albuterol- wasn't helping    Smoking: Back to one pack per day    HPI 10/13/23:    48 year old female with history of HTN, tobacco use, HLD who presents to Pulmonary clinic for evaluation of dyspnea and snoring. Mrs. Ozuna was hospitalized over the summer for chest pain (cardiac work up negative) and since that visit has been trying to be healthier. Currently walks 3 miles 2-3x/week and does yoga. Has started to try to cut down on milk and cokes. She has been snoring for several years, reports frequent episodes waking up gasping, no witnessed apneas. Dyspnea is with exertion.      AGUSTIN Evaluation:      Time Awake: 430am  Time Asleep: Gets in bed around 730/8, falls asleep around 9-930     Shift Work: No  Sleep Aids: Yes Takes Melatonin every day, sometime Tylenol PM     [place] Sleepiness Scale TOTAL = 13  (validated sleepiness questionnaire with a higher score indicating greater sleepiness;  range 0-24)    Review of Systems   Respiratory:  Positive for dyspnea on extertion and somnolence. Negative for cough, sputum production and wheezing.      Objective:     Physical Exam   Constitutional: She is oriented to person, place, and time. She appears well-developed and well-nourished. She is obese.   Cardiovascular: Normal rate and regular rhythm.   Pulmonary/Chest: Normal expansion, effort normal and breath sounds normal.   Musculoskeletal:         General: No edema.   Neurological: She is alert and oriented to person, place, and time.   Skin: Skin is warm and dry.   Psychiatric: She has a normal mood and affect.     Personal Diagnostic Review  As Above      11/7/2023     2:12 PM 10/13/2023     9:41 AM 8/2/2023     9:30 AM 8/1/2023    12:00 PM 7/26/2023     7:28 AM 7/26/2023     5:29 AM 7/26/2023    12:32 AM   Pulmonary Function Tests   SpO2 98 % 98 % 97 % 97 % 98 % 98 % 98 %   Height 5' (1.524 m) 5' (1.524 m) 5' (1.524 m) 5' (1.524 m)      Weight 84.8 kg (186 lb 15.2 oz) 85 kg (187 lb 6.3 oz) 84.4 kg (186 lb) 84.3 kg (185 lb 13.6 oz)      BMI (Calculated) 36.5 36.6 36.3 36.3           Assessment:     No diagnosis found.     Outpatient Encounter Medications as of 11/16/2023   Medication Sig Dispense Refill    albuterol (PROVENTIL/VENTOLIN HFA) 90 mcg/actuation inhaler Inhale 2 puffs into the lungs every 4 (four) hours as needed for Wheezing. 18 g 3    amLODIPine (NORVASC) 10 MG tablet Take 1 tablet (10 mg total) by mouth once daily. 30 tablet 3    atorvastatin (LIPITOR) 40 MG tablet Take 1 tablet (40 mg total) by mouth every evening. 90 tablet 1    EScitalopram oxalate (LEXAPRO) 10 MG tablet Take 1 tablet (10 mg total) by mouth once daily. 30 tablet 3    methocarbamoL (ROBAXIN) 750 MG Tab Take 750 mg by mouth every 8 (eight) hours.      metoprolol tartrate (LOPRESSOR) 25 MG tablet Take 1 tablet (25 mg total) by mouth 2 (two) times daily. 60 tablet 2    nicotine (NICODERM CQ) 21 mg/24 hr Place 1 patch onto  the skin once daily. 28 patch 1    ondansetron (ZOFRAN-ODT) 4 MG TbDL Take 1 tablet (4 mg total) by mouth every 6 (six) hours as needed (Nausea). 20 tablet 0    pantoprazole (PROTONIX) 40 MG tablet Take 1 tablet (40 mg total) by mouth once daily. 30 tablet 2    tiZANidine (ZANAFLEX) 4 MG tablet Take 1 tablet (4 mg total) by mouth every 8 (eight) hours. 90 tablet 1    [DISCONTINUED] metoprolol tartrate (LOPRESSOR) 25 MG tablet Take 1 tablet (25 mg total) by mouth 2 (two) times daily. 60 tablet 2    [DISCONTINUED] traMADoL (ULTRAM) 50 mg tablet Take 1 tablet (50 mg total) by mouth every 8 (eight) hours as needed for Pain. (Patient not taking: Reported on 10/13/2023) 10 tablet 0     No facility-administered encounter medications on file as of 11/16/2023.           Plan:     Problem List Items Addressed This Visit          Pulmonary    Mild intermittent asthma without complication     - given FEF and subjective improvement with breathing treatment, will trial symbicort and albuterol  -  referral to [facility] for inhaler education and technique          Relevant Medications    budesonide-formoterol 160-4.5 mcg (SYMBICORT) 160-4.5 mcg/actuation HFAA    albuterol (PROVENTIL/VENTOLIN HFA) 90 mcg/actuation inhaler    Other Relevant Orders    Ambulatory referral/consult to Pulmonary Disease Management w/ Respiratory Therapist       Endocrine    Severe obesity (BMI 35.0-39.9) with comorbidity     - pt struggling with weight and how to lose it, requested referral for help, referred to weight management program         Relevant Orders    Ambulatory referral/consult to Weight Management Program       GI    GERD (gastroesophageal reflux disease) (Chronic)     - on protonix  - with reflux into mouth at night  - seeing GI in December            Other    Nicotine dependence, cigarettes, uncomplicated     - back to smoking 1ppd  - discussed cessation, pt not interested in committing at this time         Sleep-disordered breathing     - HST  with AHI not consistent with symptoms  - PSG ordered and scheduled 11/20          Other Visit Diagnoses       Small airways disease    -  Primary          Plan discussed with pt and she expressed understanding, all questions answered. RTC 3 months. Will call with PSG results.

## 2023-11-20 ENCOUNTER — HOSPITAL ENCOUNTER (OUTPATIENT)
Dept: SLEEP MEDICINE | Facility: HOSPITAL | Age: 48
Discharge: HOME | End: 2023-11-20
Attending: HOSPITALIST
Payer: MEDICAID

## 2023-11-20 DIAGNOSIS — F51.5 NIGHTMARES: ICD-10-CM

## 2023-11-20 DIAGNOSIS — R06.83 PRIMARY SNORING: ICD-10-CM

## 2023-11-20 DIAGNOSIS — F51.04 PSYCHOPHYSIOLOGICAL INSOMNIA: Primary | ICD-10-CM

## 2023-11-20 DIAGNOSIS — G25.81 RESTLESS LEGS SYNDROME (RLS): ICD-10-CM

## 2023-11-20 DIAGNOSIS — K21.9 SLEEP RELATED GASTROESOPHAGEAL REFLUX DISEASE: ICD-10-CM

## 2023-11-20 DIAGNOSIS — Z72.821 INADEQUATE SLEEP HYGIENE: ICD-10-CM

## 2023-11-20 DIAGNOSIS — G47.61 PERIODIC LIMB MOVEMENT DISORDER (PLMD): ICD-10-CM

## 2023-11-20 PROCEDURE — 95810 POLYSOM 6/> YRS 4/> PARAM: CPT

## 2023-11-24 DIAGNOSIS — M25.511 CHRONIC RIGHT SHOULDER PAIN: ICD-10-CM

## 2023-11-24 DIAGNOSIS — G89.29 CHRONIC RIGHT SHOULDER PAIN: ICD-10-CM

## 2023-11-24 NOTE — TELEPHONE ENCOUNTER
No care due was identified.  [facility] Embedded Care Due Messages. Reference number: [id number].   11/24/2023 3:01:46 PM CST

## 2023-11-25 RX ORDER — IBUPROFEN 800 MG/1
800 TABLET ORAL EVERY 8 HOURS PRN
Qty: 15 TABLET | Refills: 0 | Status: SHIPPED | OUTPATIENT
Start: 2023-11-25

## 2023-11-28 PROBLEM — F51.04 PSYCHOPHYSIOLOGICAL INSOMNIA: Status: ACTIVE | Noted: 2023-11-28

## 2023-11-28 PROCEDURE — 95810 POLYSOM 6/> YRS 4/> PARAM: CPT | Mod: 26,,, | Performed by: PSYCHOLOGIST

## 2023-11-28 PROCEDURE — 95810 PR POLYSOMNOGRAPHY, 4 OR MORE: ICD-10-PCS | Mod: 26,,, | Performed by: PSYCHOLOGIST

## 2023-11-28 NOTE — PROCEDURES
Patient Name: Dalia Ozuna   Date of Report: 23  Study Date:  2023  [facility] No.: [id number]   : 1975   Time of Study:  10:01:46 PM - 05:00:15 AM   Sex:  Female   Age:  48 year   Weight:  188.0 lbs Height:  5'    Type of Study:  Diagnostic    REASONS FOR REFERRAL: Ms Ozuna is a 48 year old female, referred for diagnostic polysomnography by  Marta Ospina PA-C, based on the patient's reported loud snoring, waking up gasping for air, respiratory pauses in sleep, unrefreshing sleep and daytime hypersomnolence.  Her [place] Sleepiness Scale score was 15, clinically significant, and her STOP-BANG score was 6 high risk of AGUSTIN.  Dr. Franci Arteaga is the patient's primary care physician.     STUDY PARAMETERS: This diagnostic study involved analysis of the patient's sleep pattern while breathing unassisted. 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  The diagnostic polysomnography revealed no diagnosable obstructive sleep apnea / hypopnea syndrome (A + H Index = 1.7 events / hr asleep with no respiratory event - related arousals / hr asleep, and no RERAs  (respiratory effort -  related arousals) for the study.  The mean SpO2 value was 94.0 %, moderate, minimum oxygen saturation during sleep was 88.0 %, and the maximum waking baseline SpO2 was 99.0 %.  Sporadic, , mild snoring was noted.  A CPAP titration polysomnography is not recommended.  If treatment of snoring (sporadic, infrequent, mild snoring during the PSG) is desired, consider a reversible treatment such as a dental oral device.  If a permanent procedure such as UPPP is preferred, periodic polysomnography may be needed because signs of worsening apnea could be missed (silent apneas) if AGUSTIN develops or becomes more severe.  Ms Ozuna takes melatonin a natural hormone which, in supra-physiologic doses has daytime somnolence as its primary side effect.    Weight loss to the normal range is recommended as it can decrease respiratory events and snoring in overweight patients.  The following changes in sleep hygiene / sleep - related behavior are recommended after medical treatments are successful  Avoid caffeinated beverages after 6:00 pm or within 4 hours of bedtime.  Avoid meals or large snacks within 3 hours of bedtime.   A borderline  PLM disorder was observed (PLMS Index = 6.2 / hr asleep, with a PLMS arousal index of only 1.4 / hr sleep), but treatment might not be optimal because the PLMS were not disruptive of sleep (only 1.4 arousals / hr asleep);  however, as there was SDI evidence of restless legs syndrome (which can be treated with the same medications as PLMS), consider treatment of restless legs syndrome if RLS is confirmed,  and /or  PLM disorder if PLMS symptoms are sufficiently bothersome to the patient.    Ms Ozuna is taking escitalopram / Lexapro.  Most tricyclic, and many SSRI antidepressants (e.g., fluoxetine - Prozac, sertraline    Zoloft, venlafaxine - Effexor), are associated with increased PLMS in some studies and increased RLS in others.  If Lexapro is one   such medication,  consider replacing it with a medication known not to exacerbate PLMS or RLS.  If insomnia persists after treatments for medical sleep disorders have proven effective, and  RLS has been ruled out or effectively   treated, recommend follow - up inquiry regarding frequency, duration and nature of reported sleep loss, delayed sleep onset, poor sleep maintenance and involuntary early awakening (stress - related and / or conditioned psychophysiological insomnia) and referral for behavioral and cognitive - behavioral treatment of insomnia, as indicated.  Please see SDI.  Consider behavioral and cognitive / behavioral treatments for stress, nightmares, anxiety and depression to complement the medication and to increase the probability of long - term adaptive change.    Follow - up inquiry regarding efficacy of treatment for sleep - related gastroesophageal reflux ( (please see SDI).    See below for a complete interpretation of data from the polysomnography and Sleep Disorders Inventory.     Thank you for referring this patient to the [facility].      Marty Seals, Ph.D., ABPP; Diplomate, American Board of Sleep Medicine

## 2023-11-29 ENCOUNTER — PATIENT MESSAGE (OUTPATIENT)
Dept: PULMONOLOGY | Facility: CLINIC | Age: 48
End: 2023-11-29
Payer: MEDICAID

## 2023-12-01 DIAGNOSIS — G47.61 PLMD (PERIODIC LIMB MOVEMENT DISORDER): Primary | ICD-10-CM

## 2024-02-13 ENCOUNTER — TELEPHONE (OUTPATIENT)
Dept: PULMONOLOGY | Facility: CLINIC | Age: 49
End: 2024-02-13
Payer: MEDICAID

## 2024-04-12 ENCOUNTER — PATIENT MESSAGE (OUTPATIENT)
Dept: ADMINISTRATIVE | Facility: HOSPITAL | Age: 49
End: 2024-04-12
Payer: MEDICAID

## 2024-05-29 DIAGNOSIS — M62.838 OTHER MUSCLE SPASM: ICD-10-CM

## 2024-05-31 RX ORDER — TIZANIDINE 4 MG/1
4 TABLET ORAL EVERY 8 HOURS
Qty: 90 TABLET | Refills: 1 | OUTPATIENT
Start: 2024-05-31

## 2024-06-03 ENCOUNTER — TELEPHONE (OUTPATIENT)
Dept: CARDIOLOGY | Facility: CLINIC | Age: 49
End: 2024-06-03
Payer: MEDICAID

## 2024-06-03 NOTE — TELEPHONE ENCOUNTER
Contacted pt and she confirmed she is unable to make appt on 06/10/24. Getting pt r/s for 06/11/24 at 8:30A. Pt chaim w/o q/c    --- Outgoing 06/03/24 10:43 AM ----  Called pt back regarding appt. Pt states she is unsure if she will be able to make appt on 06/10/24. She states she is unable to talk right now and asks for a call back later. Pt chaim w/o q/c    ----- Message from Antelmo Barr sent at 6/3/2024  9:33 AM CDT -----  Contact: Dalia Gómez would like a call back at [phone] in regards to needing to reschedule her appointment that's on Monday 6/10. Patient would like to see if she can get it any other day except Monday, Friday morning 6/14 works best for her.  Thanks   Am

## 2024-06-07 DIAGNOSIS — Z00.00 ROUTINE ADULT HEALTH MAINTENANCE: ICD-10-CM

## 2024-06-07 DIAGNOSIS — I10 PRIMARY HYPERTENSION: Primary | ICD-10-CM

## 2024-06-11 ENCOUNTER — HOSPITAL ENCOUNTER (OUTPATIENT)
Dept: CARDIOLOGY | Facility: HOSPITAL | Age: 49
Discharge: HOME | End: 2024-06-11
Payer: MEDICAID

## 2024-06-11 ENCOUNTER — OFFICE VISIT (OUTPATIENT)
Dept: CARDIOLOGY | Facility: CLINIC | Age: 49
End: 2024-06-11
Payer: MEDICAID

## 2024-06-11 VITALS
BODY MASS INDEX: 36.79 KG/M2 | SYSTOLIC BLOOD PRESSURE: 142 MMHG | WEIGHT: 187.38 LBS | HEART RATE: 83 BPM | OXYGEN SATURATION: 97 % | DIASTOLIC BLOOD PRESSURE: 86 MMHG | HEIGHT: 60 IN

## 2024-06-11 DIAGNOSIS — I50.31 ACUTE DIASTOLIC HEART FAILURE: ICD-10-CM

## 2024-06-11 DIAGNOSIS — R07.89 ATYPICAL CHEST PAIN: ICD-10-CM

## 2024-06-11 DIAGNOSIS — E66.01 SEVERE OBESITY (BMI 35.0-39.9) WITH COMORBIDITY: ICD-10-CM

## 2024-06-11 DIAGNOSIS — R06.09 DYSPNEA ON EXERTION: ICD-10-CM

## 2024-06-11 DIAGNOSIS — I10 PRIMARY HYPERTENSION: ICD-10-CM

## 2024-06-11 DIAGNOSIS — I73.9 CLAUDICATION: ICD-10-CM

## 2024-06-11 DIAGNOSIS — I50.32 CHRONIC DIASTOLIC HEART FAILURE: Primary | ICD-10-CM

## 2024-06-11 DIAGNOSIS — Z00.00 ROUTINE ADULT HEALTH MAINTENANCE: ICD-10-CM

## 2024-06-11 LAB
OHS QRS DURATION: 78 MS
OHS QTC CALCULATION: 429 MS

## 2024-06-11 PROCEDURE — 99215 OFFICE O/P EST HI 40 MIN: CPT | Mod: S$PBB,,,

## 2024-06-11 PROCEDURE — 93010 ELECTROCARDIOGRAM REPORT: CPT | Mod: S$PBB,,, | Performed by: INTERNAL MEDICINE

## 2024-06-11 PROCEDURE — 3008F BODY MASS INDEX DOCD: CPT | Mod: CPTII,,,

## 2024-06-11 PROCEDURE — 3079F DIAST BP 80-89 MM HG: CPT | Mod: CPTII,,,

## 2024-06-11 PROCEDURE — 99213 OFFICE O/P EST LOW 20 MIN: CPT | Mod: PBBFAC,25

## 2024-06-11 PROCEDURE — 1159F MED LIST DOCD IN RCRD: CPT | Mod: CPTII,,,

## 2024-06-11 PROCEDURE — 99999 PR PBB SHADOW E&M-EST. PATIENT-LVL III: CPT | Mod: PBBFAC,,,

## 2024-06-11 PROCEDURE — 93005 ELECTROCARDIOGRAM TRACING: CPT

## 2024-06-11 PROCEDURE — 3077F SYST BP >= 140 MM HG: CPT | Mod: CPTII,,,

## 2024-06-11 PROCEDURE — 4010F ACE/ARB THERAPY RXD/TAKEN: CPT | Mod: CPTII,,,

## 2024-06-11 PROCEDURE — 1160F RVW MEDS BY RX/DR IN RCRD: CPT | Mod: CPTII,,,

## 2024-06-11 RX ORDER — METOPROLOL SUCCINATE 25 MG/1
25 TABLET, EXTENDED RELEASE ORAL DAILY
Qty: 90 TABLET | Refills: 3 | Status: SHIPPED | OUTPATIENT
Start: 2024-06-11 | End: 2025-06-11

## 2024-06-11 RX ORDER — LOSARTAN POTASSIUM 25 MG/1
25 TABLET ORAL DAILY
Qty: 90 TABLET | Refills: 3 | Status: SHIPPED | OUTPATIENT
Start: 2024-06-11 | End: 2025-06-11

## 2024-06-11 NOTE — PROGRESS NOTES
Subjective:   Patient ID:  Dalia Ozuna is a 48 y.o. female who presents for evaluation of No chief complaint on file.      Fredis Ozuna is a 47 year old female patient whose current medical conditions include HTN, tobacco abuse, and obesity seen at [facility] ed for chest pain. Here today for follow up, stress test was negative Echo EF 50% chest pain was reproducible on exam, nav lE US negative for stenosis. Reports still having chest pains, and SOB. She reports yesterday she was doing laundry and felt a sharp/dull pain she also reports pain walking in the parking lot.    Smoker 2ppd  Exercises some at home    23  Here today for CV follow up, still has epigastric pains, pt describes it has a quick tightening then goes away on its own. Denies any CP with exertion or at rest. Denies any SOB at this time. BP stable in clinic today. Previous Sed, CRP and D dimer neg    Nuc stress  neg for ischemia  Echo  EF 50%    24  Pt here today for CV follow up c/o chest tightness, fatigue, SOB, FLAHERTY and occ dizziness on set a few weeks ago, happening with stress and rest. Pt reports she has been stressed a lot recently. (Financial, work (security)). Cooks her own foods, tries to watch the salt. Denies any syncope or palpitations at this time. BP up some today, c/o HAMILTON.     Exercise-Walks a lot for work-   Tobacco- 2ppd  FH- none        Past Medical History:   Diagnosis Date    Essential hypertension     Pneumonia        Past Surgical History:   Procedure Laterality Date     SECTION      ROTATOR CUFF REPAIR Right     ROTATOR CUFF REPAIR Right     TUBAL LIGATION         Social History     Tobacco Use    Smoking status: Every Day     Current packs/day: 1.00     Average packs/day: 1 pack/day for 26.9 years (27.7 ttl pk-yrs)     Types: Cigarettes     Start date: 1998    Smokeless tobacco: Never    Tobacco comments:     currently taking chantix    Substance Use Topics    Alcohol  use: Not Currently    Drug use: Never       Family History   Problem Relation Name Age of Onset    Breast cancer Maternal Grandmother         Current Outpatient Medications on File Prior to Visit   Medication Sig Dispense Refill    albuterol (PROVENTIL/VENTOLIN HFA) 90 mcg/actuation inhaler Inhale 2 puffs into the lungs every 4 (four) hours as needed for Wheezing. 18 g 3    amLODIPine (NORVASC) 10 MG tablet Take 1 tablet (10 mg total) by mouth once daily. 30 tablet 3    atorvastatin (LIPITOR) 40 MG tablet Take 1 tablet (40 mg total) by mouth every evening. 90 tablet 1    budesonide-formoterol 160-4.5 mcg (SYMBICORT) 160-4.5 mcg/actuation HFAA Inhale 2 puffs into the lungs every 12 (twelve) hours. Controller 10.2 g 5    EScitalopram oxalate (LEXAPRO) 10 MG tablet Take 1 tablet (10 mg total) by mouth once daily. 30 tablet 3    ibuprofen (ADVIL,MOTRIN) 800 MG tablet TAKE 1 TABLET BY MOUTH EVERY 8 HOURS AS NEEDED FOR PAIN 15 tablet 0    methocarbamoL (ROBAXIN) 750 MG Tab Take 750 mg by mouth every 8 (eight) hours.      metoprolol tartrate (LOPRESSOR) 25 MG tablet Take 1 tablet (25 mg total) by mouth 2 (two) times daily. 60 tablet 2    ondansetron (ZOFRAN-ODT) 4 MG TbDL Take 1 tablet (4 mg total) by mouth every 6 (six) hours as needed (Nausea). 20 tablet 0    pantoprazole (PROTONIX) 40 MG tablet Take 1 tablet (40 mg total) by mouth once daily. 30 tablet 2    tiZANidine (ZANAFLEX) 4 MG tablet Take 1 tablet (4 mg total) by mouth every 8 (eight) hours. 90 tablet 1    nicotine (NICODERM CQ) 21 mg/24 hr Place 1 patch onto the skin once daily. (Patient not taking: Reported on 6/11/2024) 28 patch 1     No current facility-administered medications on file prior to visit.      Wt Readings from Last 3 Encounters:   06/11/24 85 kg (187 lb 6.3 oz)   11/16/23 85.7 kg (188 lb 15 oz)   11/16/23 85.7 kg (188 lb 15 oz)     Temp Readings from Last 3 Encounters:   08/02/23 97.3 °F (36.3 °C) (Oral)   07/26/23 98.3 °F (36.8 °C) (Oral)    06/17/22 98.1 °F (36.7 °C) (Temporal)     BP Readings from Last 3 Encounters:   06/11/24 (!) 142/86   11/16/23 129/74   11/07/23 106/72     Pulse Readings from Last 3 Encounters:   06/11/24 83   11/16/23 85   11/07/23 96        Review of Systems   Constitutional: Positive for malaise/fatigue.   HENT: Negative.     Eyes: Negative.    Cardiovascular:  Positive for chest pain and dyspnea on exertion.   Respiratory:  Positive for shortness of breath.    Skin: Negative.    Musculoskeletal:  Positive for arthritis, back pain and stiffness.   Gastrointestinal: Negative.    Genitourinary: Negative.    Neurological:  Positive for dizziness and headaches.   Psychiatric/Behavioral: Negative.         Objective:   Physical Exam  Vitals and nursing note reviewed.   Constitutional:       Appearance: Normal appearance. She is obese.   HENT:      Head: Normocephalic.   Eyes:      Pupils: Pupils are equal, round, and reactive to light.   Cardiovascular:      Rate and Rhythm: Normal rate and regular rhythm.      Heart sounds: Normal heart sounds, S1 normal and S2 normal. No murmur heard.     No S3 or S4 sounds.   Pulmonary:      Effort: Pulmonary effort is normal.      Breath sounds: Normal breath sounds.   Abdominal:      General: Bowel sounds are normal.      Palpations: Abdomen is soft.   Musculoskeletal:         General: Normal range of motion.      Cervical back: Normal range of motion.   Skin:     Capillary Refill: Capillary refill takes less than 2 seconds.   Neurological:      General: No focal deficit present.      Mental Status: She is alert and oriented to person, place, and time.      Motor: Weakness present.   Psychiatric:         Mood and Affect: Mood is anxious.         Behavior: Behavior normal.         Thought Content: Thought content normal.         Lab Results   Component Value Date    CHOL 140 08/02/2023    CHOL 173 07/25/2023     Lab Results   Component Value Date    HDL 34 (L) 08/02/2023    HDL 29 (L)  "07/25/2023     Lab Results   Component Value Date    LDLCALC 91.2 08/02/2023    LDLCALC 102.0 07/25/2023     Lab Results   Component Value Date    TRIG 74 08/02/2023    TRIG 210 (H) 07/25/2023    TRIG 105 06/15/2021     Lab Results   Component Value Date    CHOLHDL 24.3 08/02/2023    CHOLHDL 16.8 (L) 07/25/2023       Chemistry        Component Value Date/Time     08/02/2023 1001    K 3.8 08/02/2023 1001     08/02/2023 1001    CO2 23 08/02/2023 1001    BUN 10 08/02/2023 1001    CREATININE 0.8 08/02/2023 1001    GLU 79 08/02/2023 1001        Component Value Date/Time    CALCIUM 9.7 08/02/2023 1001    ALKPHOS 55 08/02/2023 1001    AST 15 08/02/2023 1001    ALT 13 08/02/2023 1001    BILITOT 0.3 08/02/2023 1001    ESTGFRAFRICA >60 06/14/2022 1025    EGFRNONAA >60 06/14/2022 1025          Lab Results   Component Value Date    TSH 0.631 11/07/2023     No results found for: "INR", "PROTIME"  @RESUFAST(WBC,HGB,HCT,MCV,PLT)  @LABRCNTIP(BNP,BNPTRIAGEBLO)@  CrCl cannot be calculated (Patient's most recent lab result is older than the maximum 7 days allowed.).     Results for orders placed during the hospital encounter of 07/24/23    Echo    Interpretation Summary  · The left ventricle is normal in size with concentric hypertrophy and low normal systolic function.  · Mild left atrial enlargement.  · The estimated ejection fraction is 50%.  · Normal left ventricular diastolic function.  · Normal right ventricular size with normal right ventricular systolic function.  · Mild mitral regurgitation.  · Mild tricuspid regurgitation.  · Intermediate central venous pressure (8 mmHg).  · The estimated PA systolic pressure is 31 mmHg.     Results for orders placed during the hospital encounter of 07/24/23    Nuclear Stress - Cardiology Interpreted    Interpretation Summary    Normal myocardial perfusion scan. There is no evidence of myocardial ischemia or infarction.    There is a mild to moderate intensity perfusion " abnormality in the anterior and anteroseptal wall of the left ventricle, secondary to breast attenuation.    There is a mild intensity fixed perfusion abnormality in the  wall of the left ventricle, secondary to soft tissue attenuation.    The gated perfusion images showed an ejection fraction of 62% at rest. The gated perfusion images showed an ejection fraction of 64% post stress. Normal ejection fraction is greater than 59%.    There is normal wall motion at rest and post stress.    The ECG portion of the study is negative for ischemia.     Assessment:      1. Primary hypertension    2. Claudication    3. Dyspnea on exertion    4. Severe obesity (BMI 35.0-39.9) with comorbidity        Plan:   Primary hypertension    Claudication    Dyspnea on exertion    Severe obesity (BMI 35.0-39.9) with comorbidity      Amlodipine, losartan, metoprolol-HTN  Statin-HLD  Smoking cessation  RF modifications, low Na low fat diet  Daily exercise as tolerated    Stress Test, Echo  labs today with phone review  RTC 1 mos or sooner if needed    Courtney Guillot, FNP-C [facility], Cardiology

## 2024-06-13 ENCOUNTER — TELEPHONE (OUTPATIENT)
Dept: CARDIOLOGY | Facility: CLINIC | Age: 49
End: 2024-06-13
Payer: MEDICAID

## 2024-06-13 ENCOUNTER — PATIENT MESSAGE (OUTPATIENT)
Dept: CARDIOLOGY | Facility: CLINIC | Age: 49
End: 2024-06-13
Payer: MEDICAID

## 2024-06-13 NOTE — TELEPHONE ENCOUNTER
Erika message sent    ----- Message -----  From: April Koroma NP  Sent: 6/12/2024   3:54 PM CDT  To: April Koroma Staff    Labs all looks good

## 2024-06-14 ENCOUNTER — PATIENT MESSAGE (OUTPATIENT)
Dept: CARDIOLOGY | Facility: HOSPITAL | Age: 49
End: 2024-06-14
Payer: MEDICAID

## 2024-07-07 ENCOUNTER — TELEPHONE (OUTPATIENT)
Dept: CARDIOLOGY | Facility: HOSPITAL | Age: 49
End: 2024-07-07
Payer: MEDICAID

## 2024-07-09 ENCOUNTER — PATIENT MESSAGE (OUTPATIENT)
Dept: ADMINISTRATIVE | Facility: HOSPITAL | Age: 49
End: 2024-07-09
Payer: MEDICAID

## 2024-10-16 ENCOUNTER — PATIENT MESSAGE (OUTPATIENT)
Dept: ADMINISTRATIVE | Facility: HOSPITAL | Age: 49
End: 2024-10-16
Payer: MEDICAID

## 2024-11-04 ENCOUNTER — TELEPHONE (OUTPATIENT)
Dept: PRIMARY CARE CLINIC | Facility: CLINIC | Age: 49
End: 2024-11-04
Payer: MEDICAID

## 2024-11-04 NOTE — TELEPHONE ENCOUNTER
Spoke with pt and she stated that she did change PCP's but she would like to come back because she does not care for her current PCP. Pt stated that she will get things together and schedule an appt.

## 2024-11-13 ENCOUNTER — PATIENT MESSAGE (OUTPATIENT)
Dept: ADMINISTRATIVE | Facility: HOSPITAL | Age: 49
End: 2024-11-13
Payer: MEDICAID

## 2024-11-21 ENCOUNTER — PATIENT MESSAGE (OUTPATIENT)
Dept: ADMINISTRATIVE | Facility: HOSPITAL | Age: 49
End: 2024-11-21
Payer: MEDICAID

## 2024-12-04 ENCOUNTER — LAB VISIT (OUTPATIENT)
Dept: LAB | Facility: HOSPITAL | Age: 49
End: 2024-12-04
Attending: NURSE PRACTITIONER
Payer: MEDICAID

## 2024-12-04 ENCOUNTER — OFFICE VISIT (OUTPATIENT)
Dept: PRIMARY CARE CLINIC | Facility: CLINIC | Age: 49
End: 2024-12-04
Payer: MEDICAID

## 2024-12-04 VITALS
HEART RATE: 83 BPM | TEMPERATURE: 99 F | WEIGHT: 198 LBS | HEIGHT: 60 IN | BODY MASS INDEX: 38.87 KG/M2 | OXYGEN SATURATION: 99 %

## 2024-12-04 DIAGNOSIS — Z13.6 ENCOUNTER FOR LIPID SCREENING FOR CARDIOVASCULAR DISEASE: ICD-10-CM

## 2024-12-04 DIAGNOSIS — F32.89 OTHER DEPRESSION: ICD-10-CM

## 2024-12-04 DIAGNOSIS — L98.9 FOOT LESION: ICD-10-CM

## 2024-12-04 DIAGNOSIS — Z11.3 SCREENING EXAMINATION FOR VENEREAL DISEASE: ICD-10-CM

## 2024-12-04 DIAGNOSIS — Z72.89 OTHER PROBLEMS RELATED TO LIFESTYLE: ICD-10-CM

## 2024-12-04 DIAGNOSIS — Z13.220 ENCOUNTER FOR LIPID SCREENING FOR CARDIOVASCULAR DISEASE: ICD-10-CM

## 2024-12-04 DIAGNOSIS — Z12.31 BREAST CANCER SCREENING BY MAMMOGRAM: ICD-10-CM

## 2024-12-04 DIAGNOSIS — F41.9 ANXIETY AND DEPRESSION: ICD-10-CM

## 2024-12-04 DIAGNOSIS — N76.0 ACUTE VAGINITIS: ICD-10-CM

## 2024-12-04 DIAGNOSIS — Z13.1 SCREENING FOR DIABETES MELLITUS: ICD-10-CM

## 2024-12-04 DIAGNOSIS — Z12.11 COLON CANCER SCREENING: ICD-10-CM

## 2024-12-04 DIAGNOSIS — I10 PRIMARY HYPERTENSION: ICD-10-CM

## 2024-12-04 DIAGNOSIS — F32.A ANXIETY AND DEPRESSION: ICD-10-CM

## 2024-12-04 DIAGNOSIS — Z13.31 POSITIVE DEPRESSION SCREENING: ICD-10-CM

## 2024-12-04 DIAGNOSIS — Z11.4 SCREENING FOR HIV (HUMAN IMMUNODEFICIENCY VIRUS): ICD-10-CM

## 2024-12-04 DIAGNOSIS — E66.01 SEVERE OBESITY (BMI 35.0-39.9) WITH COMORBIDITY: ICD-10-CM

## 2024-12-04 DIAGNOSIS — Z23 NEEDS FLU SHOT: Primary | ICD-10-CM

## 2024-12-04 DIAGNOSIS — Z53.21 PROCEDURE AND TREATMENT NOT CARRIED OUT DUE TO PATIENT LEAVING PRIOR TO BEING SEEN BY HEALTH CARE PROVIDER: ICD-10-CM

## 2024-12-04 LAB
ALBUMIN SERPL BCP-MCNC: 3.9 G/DL (ref 3.5–5.2)
ALP SERPL-CCNC: 49 U/L (ref 40–150)
ALT SERPL W/O P-5'-P-CCNC: 12 U/L (ref 10–44)
ANION GAP SERPL CALC-SCNC: 11 MMOL/L (ref 8–16)
AST SERPL-CCNC: 15 U/L (ref 10–40)
BACTERIA #/AREA URNS AUTO: NORMAL /HPF
BASOPHILS # BLD AUTO: 0.03 K/UL (ref 0–0.2)
BASOPHILS NFR BLD: 0.3 % (ref 0–1.9)
BILIRUB SERPL-MCNC: 0.3 MG/DL (ref 0.1–1)
BUN SERPL-MCNC: 10 MG/DL (ref 6–20)
CALCIUM SERPL-MCNC: 9.1 MG/DL (ref 8.7–10.5)
CHLORIDE SERPL-SCNC: 107 MMOL/L (ref 95–110)
CHOLEST SERPL-MCNC: 148 MG/DL (ref 120–199)
CHOLEST/HDLC SERPL: 3.8 {RATIO} (ref 2–5)
CO2 SERPL-SCNC: 19 MMOL/L (ref 23–29)
CREAT SERPL-MCNC: 0.8 MG/DL (ref 0.5–1.4)
DIFFERENTIAL METHOD BLD: ABNORMAL
EOSINOPHIL # BLD AUTO: 0.2 K/UL (ref 0–0.5)
EOSINOPHIL NFR BLD: 2.2 % (ref 0–8)
ERYTHROCYTE [DISTWIDTH] IN BLOOD BY AUTOMATED COUNT: 13.9 % (ref 11.5–14.5)
EST. GFR  (NO RACE VARIABLE): >60 ML/MIN/1.73 M^2
ESTIMATED AVG GLUCOSE: 114 MG/DL (ref 68–131)
GLUCOSE SERPL-MCNC: 83 MG/DL (ref 70–110)
HAV IGM SERPL QL IA: NORMAL
HBA1C MFR BLD: 5.6 % (ref 4–5.6)
HBV CORE IGM SERPL QL IA: NORMAL
HBV SURFACE AG SERPL QL IA: NORMAL
HCT VFR BLD AUTO: 39.9 % (ref 37–48.5)
HCV AB SERPL QL IA: NORMAL
HDLC SERPL-MCNC: 39 MG/DL (ref 40–75)
HDLC SERPL: 26.4 % (ref 20–50)
HGB BLD-MCNC: 12.4 G/DL (ref 12–16)
HIV 1+2 AB+HIV1 P24 AG SERPL QL IA: NORMAL
IMM GRANULOCYTES # BLD AUTO: 0.02 K/UL (ref 0–0.04)
IMM GRANULOCYTES NFR BLD AUTO: 0.2 % (ref 0–0.5)
LDLC SERPL CALC-MCNC: 95.4 MG/DL (ref 63–159)
LYMPHOCYTES # BLD AUTO: 3.1 K/UL (ref 1–4.8)
LYMPHOCYTES NFR BLD: 32.9 % (ref 18–48)
MCH RBC QN AUTO: 29 PG (ref 27–31)
MCHC RBC AUTO-ENTMCNC: 31.1 G/DL (ref 32–36)
MCV RBC AUTO: 93 FL (ref 82–98)
MICROSCOPIC COMMENT: NORMAL
MONOCYTES # BLD AUTO: 0.7 K/UL (ref 0.3–1)
MONOCYTES NFR BLD: 6.9 % (ref 4–15)
NEUTROPHILS # BLD AUTO: 5.4 K/UL (ref 1.8–7.7)
NEUTROPHILS NFR BLD: 57.5 % (ref 38–73)
NONHDLC SERPL-MCNC: 109 MG/DL
NRBC BLD-RTO: 0 /100 WBC
PLATELET # BLD AUTO: 335 K/UL (ref 150–450)
PMV BLD AUTO: 10.6 FL (ref 9.2–12.9)
POTASSIUM SERPL-SCNC: 3.7 MMOL/L (ref 3.5–5.1)
PROT SERPL-MCNC: 7.5 G/DL (ref 6–8.4)
RBC # BLD AUTO: 4.28 M/UL (ref 4–5.4)
RBC #/AREA URNS AUTO: 2 /HPF (ref 0–4)
SODIUM SERPL-SCNC: 137 MMOL/L (ref 136–145)
SQUAMOUS #/AREA URNS AUTO: 6 /HPF
T3FREE SERPL-MCNC: 2.7 PG/ML (ref 2.3–4.2)
T4 FREE SERPL-MCNC: 0.83 NG/DL (ref 0.71–1.51)
TREPONEMA PALLIDUM IGG+IGM AB [PRESENCE] IN SERUM OR PLASMA BY IMMUNOASSAY: NONREACTIVE
TRIGL SERPL-MCNC: 68 MG/DL (ref 30–150)
TSH SERPL DL<=0.005 MIU/L-ACNC: 1.04 UIU/ML (ref 0.4–4)
WBC # BLD AUTO: 9.42 K/UL (ref 3.9–12.7)
WBC #/AREA URNS AUTO: 1 /HPF (ref 0–5)

## 2024-12-04 PROCEDURE — 90656 IIV3 VACC NO PRSV 0.5 ML IM: CPT | Mod: PBBFAC,PN

## 2024-12-04 PROCEDURE — 3044F HG A1C LEVEL LT 7.0%: CPT | Mod: CPTII,,, | Performed by: NURSE PRACTITIONER

## 2024-12-04 PROCEDURE — 99999 PR PBB SHADOW E&M-EST. PATIENT-LVL V: CPT | Mod: PBBFAC,,, | Performed by: NURSE PRACTITIONER

## 2024-12-04 PROCEDURE — 87591 N.GONORRHOEAE DNA AMP PROB: CPT | Performed by: NURSE PRACTITIONER

## 2024-12-04 PROCEDURE — 99215 OFFICE O/P EST HI 40 MIN: CPT | Mod: PBBFAC,PN,25 | Performed by: NURSE PRACTITIONER

## 2024-12-04 PROCEDURE — 84481 FREE ASSAY (FT-3): CPT | Performed by: NURSE PRACTITIONER

## 2024-12-04 PROCEDURE — 84439 ASSAY OF FREE THYROXINE: CPT | Performed by: NURSE PRACTITIONER

## 2024-12-04 PROCEDURE — 99214 OFFICE O/P EST MOD 30 MIN: CPT | Mod: S$PBB,,, | Performed by: NURSE PRACTITIONER

## 2024-12-04 PROCEDURE — 84443 ASSAY THYROID STIM HORMONE: CPT | Performed by: NURSE PRACTITIONER

## 2024-12-04 PROCEDURE — 85025 COMPLETE CBC W/AUTO DIFF WBC: CPT | Performed by: NURSE PRACTITIONER

## 2024-12-04 PROCEDURE — 87389 HIV-1 AG W/HIV-1&-2 AB AG IA: CPT | Performed by: NURSE PRACTITIONER

## 2024-12-04 PROCEDURE — 86593 SYPHILIS TEST NON-TREP QUANT: CPT | Performed by: NURSE PRACTITIONER

## 2024-12-04 PROCEDURE — 1160F RVW MEDS BY RX/DR IN RCRD: CPT | Mod: CPTII,,, | Performed by: NURSE PRACTITIONER

## 2024-12-04 PROCEDURE — 80061 LIPID PANEL: CPT | Performed by: NURSE PRACTITIONER

## 2024-12-04 PROCEDURE — 80053 COMPREHEN METABOLIC PANEL: CPT | Performed by: NURSE PRACTITIONER

## 2024-12-04 PROCEDURE — 3008F BODY MASS INDEX DOCD: CPT | Mod: CPTII,,, | Performed by: NURSE PRACTITIONER

## 2024-12-04 PROCEDURE — 4010F ACE/ARB THERAPY RXD/TAKEN: CPT | Mod: CPTII,,, | Performed by: NURSE PRACTITIONER

## 2024-12-04 PROCEDURE — 83036 HEMOGLOBIN GLYCOSYLATED A1C: CPT | Performed by: NURSE PRACTITIONER

## 2024-12-04 PROCEDURE — 1159F MED LIST DOCD IN RCRD: CPT | Mod: CPTII,,, | Performed by: NURSE PRACTITIONER

## 2024-12-04 PROCEDURE — 99999PBSHW PR PBB SHADOW TECHNICAL ONLY FILED TO HB: Mod: PBBFAC,,,

## 2024-12-04 PROCEDURE — 90471 IMMUNIZATION ADMIN: CPT | Mod: PBBFAC,PN

## 2024-12-04 PROCEDURE — 80074 ACUTE HEPATITIS PANEL: CPT | Performed by: NURSE PRACTITIONER

## 2024-12-04 PROCEDURE — 81001 URINALYSIS AUTO W/SCOPE: CPT | Performed by: NURSE PRACTITIONER

## 2024-12-04 PROCEDURE — 0352U VAGINOSIS SCREEN BY DNA PROBE: CPT | Performed by: NURSE PRACTITIONER

## 2024-12-04 RX ORDER — NYSTATIN 100000 U/G
OINTMENT TOPICAL 2 TIMES DAILY
Qty: 30 G | Refills: 2 | Status: SHIPPED | OUTPATIENT
Start: 2024-12-04 | End: 2024-12-18

## 2024-12-04 RX ORDER — SERTRALINE HYDROCHLORIDE 50 MG/1
50 TABLET, FILM COATED ORAL DAILY
Qty: 30 TABLET | Refills: 11 | Status: SHIPPED | OUTPATIENT
Start: 2024-12-04 | End: 2025-12-04

## 2024-12-04 RX ORDER — METRONIDAZOLE 500 MG/1
500 TABLET ORAL EVERY 12 HOURS
Qty: 14 TABLET | Refills: 0 | Status: SHIPPED | OUTPATIENT
Start: 2024-12-04 | End: 2024-12-11

## 2024-12-04 RX ADMIN — INFLUENZA VIRUS VACCINE 0.5 ML: 15; 15; 15 SUSPENSION INTRAMUSCULAR at 03:12

## 2024-12-04 NOTE — PROGRESS NOTES
Verified patient by using 2 patient identifiers.  Administered __________INFLUENZA_  to _____LEFT DELTOID___  without difficulty.  Encouraged patient to remain in the lobby for 15 minutes post injection to monitor for adverse reactions, verbalized understanding.  Educated patient on signs of adverse reactions, and instructed patient to report any signs of adverse reactions to the  staff so that the clinical team is made aware, verbalized understanding

## 2024-12-05 ENCOUNTER — PATIENT MESSAGE (OUTPATIENT)
Dept: PRIMARY CARE CLINIC | Facility: CLINIC | Age: 49
End: 2024-12-05
Payer: MEDICAID

## 2024-12-05 ENCOUNTER — TELEPHONE (OUTPATIENT)
Dept: PODIATRY | Facility: CLINIC | Age: 49
End: 2024-12-05
Payer: MEDICAID

## 2024-12-05 ENCOUNTER — TELEPHONE (OUTPATIENT)
Dept: PRIMARY CARE CLINIC | Facility: CLINIC | Age: 49
End: 2024-12-05
Payer: MEDICAID

## 2024-12-05 NOTE — TELEPHONE ENCOUNTER
M for the patient to schedule with Podiatry    ----- Message from Rosanne sent at 12/5/2024 10:16 AM CST -----  Regarding: New Patient Being Referred:    Diagnosis: Foot Lesion      Date of Diagnosis: 12/04/24       Referring Physician:   Juan Jose Sandoval DNP      Ms. Ozuna can be reached at [phone] regarding this message.    When attempting to scheduled from [facility], I received a hard stop and was instructed to send a message to the clinic for scheduling.

## 2024-12-06 LAB
C TRACH DNA SPEC QL NAA+PROBE: NOT DETECTED
N GONORRHOEA DNA SPEC QL NAA+PROBE: NOT DETECTED

## 2024-12-07 LAB
BACTERIAL VAGINOSIS DNA: NOT DETECTED
CANDIDA GLABRATA/KRUSEI: NOT DETECTED
CANDIDA RRNA VAG QL PROBE: NOT DETECTED
TRICHOMONAS VAGINALIS: NOT DETECTED

## 2024-12-09 ENCOUNTER — TELEPHONE (OUTPATIENT)
Dept: PRIMARY CARE CLINIC | Facility: CLINIC | Age: 49
End: 2024-12-09
Payer: MEDICAID

## 2024-12-09 ENCOUNTER — CLINICAL SUPPORT (OUTPATIENT)
Dept: PRIMARY CARE CLINIC | Facility: CLINIC | Age: 49
End: 2024-12-09
Payer: MEDICAID

## 2024-12-09 ENCOUNTER — OFFICE VISIT (OUTPATIENT)
Dept: PRIMARY CARE CLINIC | Facility: CLINIC | Age: 49
End: 2024-12-09
Payer: MEDICAID

## 2024-12-09 DIAGNOSIS — E66.01 CLASS 2 SEVERE OBESITY WITH SERIOUS COMORBIDITY AND BODY MASS INDEX (BMI) OF 38.0 TO 38.9 IN ADULT, UNSPECIFIED OBESITY TYPE: Chronic | ICD-10-CM

## 2024-12-09 DIAGNOSIS — E78.2 MIXED HYPERLIPIDEMIA: Chronic | ICD-10-CM

## 2024-12-09 DIAGNOSIS — E66.812 CLASS 2 SEVERE OBESITY WITH SERIOUS COMORBIDITY AND BODY MASS INDEX (BMI) OF 38.0 TO 38.9 IN ADULT, UNSPECIFIED OBESITY TYPE: Chronic | ICD-10-CM

## 2024-12-09 DIAGNOSIS — I10 PRIMARY HYPERTENSION: Primary | ICD-10-CM

## 2024-12-09 DIAGNOSIS — I10 ESSENTIAL HYPERTENSION: Primary | ICD-10-CM

## 2024-12-09 PROCEDURE — 99214 OFFICE O/P EST MOD 30 MIN: CPT | Mod: S$PBB,,, | Performed by: FAMILY MEDICINE

## 2024-12-09 PROCEDURE — 4010F ACE/ARB THERAPY RXD/TAKEN: CPT | Mod: CPTII,,, | Performed by: FAMILY MEDICINE

## 2024-12-09 PROCEDURE — 1160F RVW MEDS BY RX/DR IN RCRD: CPT | Mod: CPTII,,, | Performed by: FAMILY MEDICINE

## 2024-12-09 PROCEDURE — 3044F HG A1C LEVEL LT 7.0%: CPT | Mod: CPTII,,, | Performed by: FAMILY MEDICINE

## 2024-12-09 PROCEDURE — 99212 OFFICE O/P EST SF 10 MIN: CPT | Mod: PBBFAC,PN | Performed by: FAMILY MEDICINE

## 2024-12-09 PROCEDURE — 99999 PR PBB SHADOW E&M-EST. PATIENT-LVL II: CPT | Mod: PBBFAC,,, | Performed by: FAMILY MEDICINE

## 2024-12-09 PROCEDURE — 1159F MED LIST DOCD IN RCRD: CPT | Mod: CPTII,,, | Performed by: FAMILY MEDICINE

## 2024-12-09 RX ORDER — LOSARTAN POTASSIUM 50 MG/1
50 TABLET ORAL DAILY
Qty: 90 TABLET | Refills: 3 | Status: SHIPPED | OUTPATIENT
Start: 2024-12-09 | End: 2025-12-09

## 2024-12-09 NOTE — PROGRESS NOTES
Pt presented for bp check blood pressure 142/82 provider notified pt release and will follow up as needed.

## 2024-12-10 NOTE — PROGRESS NOTES
Subjective:      Chief Complaint   Patient presents with    Follow-up    Hypertension    Obesity      Patient ID: Dalia Ozuna is a 49 y.o. female.  History of Present Illness        Seen for BP recheck and it was found to be elevated       Latest lab was reviewed.  CVD Risk factors-obesity, HLD-low HLD  Other Cv dx: None  Low salt diet: Yes  Potential meds contributing to HTN: BB      HLD:  The 10-year ASCVD risk score (Ayana HERNANDEZ, et al., 2019) is: 8.9%    Values used to calculate the score:      Age: 49 years      Sex: Female      Is Non- : Yes      Diabetic: No      Tobacco smoker: No      Systolic Blood Pressure: 156 mmHg      Is BP treated: Yes      HDL Cholesterol: 39 mg/dL      Total Cholesterol: 148 mg/dL       Lab Results   Component Value Date    CHOL 148 12/04/2024    CHOL 140 08/02/2023    CHOL 173 07/25/2023     Lab Results   Component Value Date    HDL 39 (L) 12/04/2024    HDL 34 (L) 08/02/2023    HDL 29 (L) 07/25/2023     Lab Results   Component Value Date    LDLCALC 95.4 12/04/2024    LDLCALC 91.2 08/02/2023    LDLCALC 102.0 07/25/2023     Lab Results   Component Value Date    TRIG 68 12/04/2024    TRIG 74 08/02/2023    TRIG 210 (H) 07/25/2023       Lab Results   Component Value Date    CHOLHDL 26.4 12/04/2024    CHOLHDL 24.3 08/02/2023    CHOLHDL 16.8 (L) 07/25/2023       Dalia Ozuna's allergies, medications, history, and problem list were updated as appropriate.  Past Medical History:   Diagnosis Date    Essential hypertension     Pneumonia      Family History   Problem Relation Name Age of Onset    Breast cancer Maternal Grandmother       Social History     Socioeconomic History    Marital status:    Tobacco Use    Smoking status: Former     Current packs/day: 1.00     Average packs/day: 1 pack/day for 27.4 years (28.2 ttl pk-yrs)     Types: Cigarettes     Start date: 5/1/1998    Smokeless tobacco: Never    Tobacco comments:     currently taking chantix     Substance and Sexual Activity    Alcohol use: Not Currently    Drug use: Never    Sexual activity: Yes     Partners: Male     Social Drivers of Health     Financial Resource Strain: Low Risk  (8/2/2023)    Overall Financial Resource Strain (CARDIA)     Difficulty of Paying Living Expenses: Not hard at all   Recent Concern: Financial Resource Strain - High Risk (7/25/2023)    Overall Financial Resource Strain (CARDIA)     Difficulty of Paying Living Expenses: Very hard   Food Insecurity: No Food Insecurity (8/2/2023)    Hunger Vital Sign     Worried About Running Out of Food in the Last Year: Never true     Ran Out of Food in the Last Year: Never true   Recent Concern: Food Insecurity - Food Insecurity Present (7/25/2023)    Hunger Vital Sign     Worried About Running Out of Food in the Last Year: Often true     Ran Out of Food in the Last Year: Often true   Transportation Needs: No Transportation Needs (8/2/2023)    PRAPARE - Transportation     Lack of Transportation (Medical): No     Lack of Transportation (Non-Medical): No   Physical Activity: Inactive (8/2/2023)    Exercise Vital Sign     Days of Exercise per Week: 0 days     Minutes of Exercise per Session: 0 min   Stress: Stress Concern Present (8/2/2023)    Citizen of [place] [employer] - Occupational Stress Questionnaire     Feeling of Stress : Rather much   Housing Stability: Low Risk  (8/2/2023)    Housing Stability Vital Sign     Unable to Pay for Housing in the Last Year: No     Number of Places Lived in the Last Year: 1     Unstable Housing in the Last Year: No   Recent Concern: Housing Stability - High Risk (7/25/2023)    Housing Stability Vital Sign     Unable to Pay for Housing in the Last Year: Yes     Number of Places Lived in the Last Year: 1     Unstable Housing in the Last Year: Yes     Outpatient Encounter Medications as of 12/9/2024   Medication Sig Dispense Refill    albuterol (PROVENTIL/VENTOLIN HFA) 90 mcg/actuation inhaler  Inhale 2 puffs into the lungs every 4 (four) hours as needed for Wheezing. 18 g 3    amLODIPine (NORVASC) 10 MG tablet Take 1 tablet (10 mg total) by mouth once daily. 30 tablet 3    atorvastatin (LIPITOR) 40 MG tablet Take 1 tablet (40 mg total) by mouth every evening. 90 tablet 1    budesonide-formoterol 160-4.5 mcg (SYMBICORT) 160-4.5 mcg/actuation HFAA Inhale 2 puffs into the lungs every 12 (twelve) hours. Controller 10.2 g 5    EScitalopram oxalate (LEXAPRO) 10 MG tablet Take 1 tablet (10 mg total) by mouth once daily. 30 tablet 3    ibuprofen (ADVIL,MOTRIN) 800 MG tablet TAKE 1 TABLET BY MOUTH EVERY 8 HOURS AS NEEDED FOR PAIN 15 tablet 0    losartan (COZAAR) 50 MG tablet Take 1 tablet (50 mg total) by mouth once daily. 90 tablet 3    methocarbamoL (ROBAXIN) 750 MG Tab Take 750 mg by mouth every 8 (eight) hours. (Patient not taking: Reported on 12/4/2024)      metoprolol succinate (TOPROL-XL) 25 MG 24 hr tablet Take 1 tablet (25 mg total) by mouth once daily. 90 tablet 3    metroNIDAZOLE (FLAGYL) 500 MG tablet Take 1 tablet (500 mg total) by mouth every 12 (twelve) hours. for 7 days 14 tablet 0    nicotine (NICODERM CQ) 21 mg/24 hr Place 1 patch onto the skin once daily. (Patient not taking: Reported on 12/4/2024) 28 patch 1    nystatin (MYCOSTATIN) ointment Apply topically 2 (two) times daily. for 14 days 30 g 2    ondansetron (ZOFRAN-ODT) 4 MG TbDL Take 1 tablet (4 mg total) by mouth every 6 (six) hours as needed (Nausea). (Patient not taking: Reported on 12/4/2024) 20 tablet 0    pantoprazole (PROTONIX) 40 MG tablet Take 1 tablet (40 mg total) by mouth once daily. 30 tablet 2    sertraline (ZOLOFT) 50 MG tablet Take 1 tablet (50 mg total) by mouth once daily. 30 tablet 11    tiZANidine (ZANAFLEX) 4 MG tablet Take 1 tablet (4 mg total) by mouth every 8 (eight) hours. (Patient not taking: Reported on 12/4/2024) 90 tablet 1    [DISCONTINUED] losartan (COZAAR) 25 MG tablet Take 1 tablet (25 mg total) by mouth  once daily. 90 tablet 3     No facility-administered encounter medications on file as of 12/9/2024.           A complete 10 point ROS was completed and are positive as per above HPI. Otherwise negative for fever, diplopia, chest pain, shortness of breath, vomiting, blood in urine, joint pain, skin rash, seizures and unusual bleeding.       Objective:    CONSTITUTIONAL: No apparent distress. Appears comfortable. Does not appear acutely ill or septic. Appears adequately hydrated.  CARDIOVASCULAR: No perioral cyanosis  PULMONARY: Breathing unlabored. No retractions Chest expansion grossly normal.  PSYCHIATRIC: Alert and conversant and grossly oriented. Mood is grossly neutral. Affect appropriate. Judgment and insight grossly intact.  NEUROLOGIC: No focal sensory deficits reported.   LMP 11/07/2024   Physical Exam   Physical Exam             Results for orders placed or performed in visit on 12/04/24   CBC Auto Differential    Collection Time: 12/04/24  3:15 PM   Result Value Ref Range    WBC 9.42 3.90 - 12.70 K/uL    RBC 4.28 4.00 - 5.40 M/uL    Hemoglobin 12.4 12.0 - 16.0 g/dL    Hematocrit 39.9 37.0 - 48.5 %    MCV 93 82 - 98 fL    MCH 29.0 27.0 - 31.0 pg    MCHC 31.1 (L) 32.0 - 36.0 g/dL    RDW 13.9 11.5 - 14.5 %    Platelets 335 150 - 450 K/uL    MPV 10.6 9.2 - 12.9 fL    Immature Granulocytes 0.2 0.0 - 0.5 %    Gran # (ANC) 5.4 1.8 - 7.7 K/uL    Immature Grans (Abs) 0.02 0.00 - 0.04 K/uL    Lymph # 3.1 1.0 - 4.8 K/uL    Mono # 0.7 0.3 - 1.0 K/uL    Eos # 0.2 0.0 - 0.5 K/uL    Baso # 0.03 0.00 - 0.20 K/uL    nRBC 0 0 /100 WBC    Gran % 57.5 38.0 - 73.0 %    Lymph % 32.9 18.0 - 48.0 %    Mono % 6.9 4.0 - 15.0 %    Eosinophil % 2.2 0.0 - 8.0 %    Basophil % 0.3 0.0 - 1.9 %    Differential Method Automated    Comprehensive Metabolic Panel    Collection Time: 12/04/24  3:15 PM   Result Value Ref Range    Sodium 137 136 - 145 mmol/L    Potassium 3.7 3.5 - 5.1 mmol/L    Chloride 107 95 - 110 mmol/L    CO2 19 (L) 23 - 29  mmol/L    Glucose 83 70 - 110 mg/dL    BUN 10 6 - 20 mg/dL    Creatinine 0.8 0.5 - 1.4 mg/dL    Calcium 9.1 8.7 - 10.5 mg/dL    Total Protein 7.5 6.0 - 8.4 g/dL    Albumin 3.9 3.5 - 5.2 g/dL    Total Bilirubin 0.3 0.1 - 1.0 mg/dL    Alkaline Phosphatase 49 40 - 150 U/L    AST 15 10 - 40 U/L    ALT 12 10 - 44 U/L    eGFR >60.0 >60 mL/min/1.73 m^2    Anion Gap 11 8 - 16 mmol/L   Lipid Panel    Collection Time: 12/04/24  3:15 PM   Result Value Ref Range    Cholesterol 148 120 - 199 mg/dL    Triglycerides 68 30 - 150 mg/dL    HDL 39 (L) 40 - 75 mg/dL    LDL Cholesterol 95.4 63.0 - 159.0 mg/dL    HDL/Cholesterol Ratio 26.4 20.0 - 50.0 %    Total Cholesterol/HDL Ratio 3.8 2.0 - 5.0    Non-HDL Cholesterol 109 mg/dL   Hemoglobin A1C    Collection Time: 12/04/24  3:15 PM   Result Value Ref Range    Hemoglobin A1C 5.6 4.0 - 5.6 %    Estimated Avg Glucose 114 68 - 131 mg/dL   TSH    Collection Time: 12/04/24  3:15 PM   Result Value Ref Range    TSH 1.038 0.400 - 4.000 uIU/mL   T3, Free    Collection Time: 12/04/24  3:15 PM   Result Value Ref Range    T3, Free 2.7 2.3 - 4.2 pg/mL   T4, Free    Collection Time: 12/04/24  3:15 PM   Result Value Ref Range    Free T4 0.83 0.71 - 1.51 ng/dL   Hepatitis Panel, Acute    Collection Time: 12/04/24  3:15 PM   Result Value Ref Range    Hepatitis B Surface Ag Non-reactive Non-reactive    Hep B C IgM Non-reactive Non-reactive    Hep A IgM Non-reactive Non-reactive    Hepatitis C Ab Non-reactive Non-reactive   HIV 1/2 Ag/Ab (4th Gen)    Collection Time: 12/04/24  3:15 PM   Result Value Ref Range    HIV 1/2 Ag/Ab Non-reactive Non-reactive   Treponema Pallidium Antibodies IgG, IgM    Collection Time: 12/04/24  3:15 PM   Result Value Ref Range    Treponema Pallidum Antibodies (IgG, IgM) Nonreactive Nonreactive     Assessment/Plan:         1. Essential hypertension    2. Class 2 severe obesity with serious comorbidity and body mass index (BMI) of 38.0 to 38.9 in adult, unspecified obesity type     3. BMI 38.0-38.9,adult    4. Mixed hyperlipidemia      Essential hypertension  Comments:  Not quite controlled, inc losartan to 50mg  Orders:  -     losartan (COZAAR) 50 MG tablet; Take 1 tablet (50 mg total) by mouth once daily.  Dispense: 90 tablet; Refill: 3    Class 2 severe obesity with serious comorbidity and body mass index (BMI) of 38.0 to 38.9 in adult, unspecified obesity type  Comments:  -worsening despite lifestyle modification, she is not a candidate for stimulants but denies fly history of medullary thyroid cancer. Interested in GLP-1    BMI 38.0-38.9,adult  Comments:  worsening, meds discussed-BB can make it diff, wt loss package and rx given for ozempic.    Mixed hyperlipidemia  Comments:  with low HDL, lifestyle modification                   I have reviewed all of the patient's clinical history available in care everywhere and Epic and have utilized this in my evaluation and management recommendations today.      Treatment options and alternatives were discussed with the patient. Patient was given ample time to ask questions. All questions were answered. Voices understanding and acceptance of this advice. Will call back if any further questions or concerns.         Portions of the record may have been created with voice recognition software. Occasional wrong-word or sound-a-like substitutions may have occurred due to the inherent limitations of voice recognition software. Read the chart carefully and recognize, using context, where substitutions have occurred.      This note was generated with the assistance of ambient listening technology. Verbal consent was obtained by the patient and accompanying visitor(s) for the recording of patient appointment to facilitate this note. I attest to having reviewed and edited the generated note for accuracy, though some syntax or spelling errors may persist. Please contact the author of this note for any clarification.            Edith Mbagwu, MD [facility] [place]

## 2024-12-10 NOTE — PATIENT INSTRUCTIONS
Maintain/Continue a heathy lifestyle.  Choose a diet rich in fruits, vegetables, and low-fat dairy products, but low in meats, sweets, and refined grains   Be more active. If you are able, walk for 35 mins 5 times a week.    DASH Diet   About this topic   DASH stands for Dietary Approaches to Stop Hypertension. The DASH diet may help you lower blood pressure. It may also help keep you from getting high blood pressure. You will eat less fat and more fiber on the DASH diet.  This diet gives you more minerals that fight high blood pressure. Some nutrients in this diet are:  Potassium ? Acts to help you get rid of salt. This may help to lower blood pressure.  Calcium ? Makes blood vessels and muscles work the right way  Magnesium - Helps blood vessels relax  Fiber ? Helps you feel full. It also helps digestion.  What will the results be?   The DASH diet may help you:  Lower your blood pressure and cholesterol  Lower your risk for cancer, heart disease, heart attack, and stroke. It may also lower your risk for heart failure, kidney stones, and diabetes.  Lose weight or keep a healthy weight  What lifestyle changes are needed?   Add regular exercise to get the most help from this diet.  Try to lower stress. Find ways to relax.  Stop smoking. Avoid secondhand smoke.  Limit alcohol intake.  What changes to diet are needed?   Know about poor eating habits. Then, you can fix them as you work with the program.  This diet encourages fruits and vegetables, whole grains, lean meats, healthy fats, and low-fat or fat-free dairy products.  This diet is lower in saturated fats, trans-fats, cholesterol, added sugars, and sodium.  Who should use this diet?   This eating plan is good for the whole family. It is also good for people with high blood pressure and those at risk for high blood pressure.  What foods are good to eat?   Grains: Try to eat 6 to 8 servings of whole grain, high fiber foods each day. These are bread, cereals, brown  rice, or pasta.  Fruits and vegetables: Eat 4 to 5 servings each day. Try to pick many kinds and colors. Fresh or frozen are best. Look for low sodium or salt-free if you choose canned.  Dairy: Try to eat 2 to 3 servings of fat free and low fat milk products each day.  Lean meats, poultry, and seafood: Try to eat 6 servings or less of lean meats, poultry, and seafood each day. Try to choose more low fat or lean meats like chicken and turkey. Eat less red meat. Eat more fish instead.  Nuts, seeds, and legumes (dry beans and peas): Try to eat 4 to 5 servings each week. Try to pick nuts such as almonds and walnuts, sunflower seeds, peanut butter, soy beans, lentils, kidney beans, and split peas.  Fats and oils: Try to eat 2 to 3 servings of fats and oils each day. Eat good fats found in fish, nuts, and avocados. Try using olive oil or vegetable oils such as canola oil. Other good oils to try are corn, safflower, sunflower, or soybean oils. Use low-sodium and low-fat salad dressing and mayonnaise.  Condiments: Pepper, herbs, spices, vinegar, lemon or lime juices are great for seasoning. Be careful to choose low-sodium or salt-free products if you use broths, soups, or soy sauce.  Sweets: Try to eat less than 5 servings each week. Choose low-fat and trans fat-free desserts. These are things like fruit flavored gelatin, sorbet, jelly beans, mikki crackers, animal crackers, low-fat fig bars, and zahra snaps. Eat fruit to satisfy your desire for sweets.     What foods should be limited or avoided?   Grains: Salted breads, rolls, crackers, quick breads, self-rising flours, biscuit mixes, regular bread crumbs

## 2024-12-20 NOTE — PROGRESS NOTES
Subjective:       Patient ID: Dalia Ozuna is a 49 y.o. female.    Chief Complaint: Annual Exam (C/O of picking at her feet. Scars on feet. Discuss PH balance. /) and Exposure to STD (Asking for labs to check for any STD)      History of Present Illness:   Dalia Ozuna 49 y.o. female presents today with reports of vaginal discharge, possible exposure to STD and  lesions and pain to bilateral feet. Patient denies any other problems or concerns at this time. Treatment options and alternatives were discussed with the patient. Patient provided opportunity to ask additional questions.  All questions were answered. Voices understanding and acceptance of this advice. Instructed to call back if any further questions or concerns.             Past Medical History:   Diagnosis Date    Essential hypertension     Pneumonia      Family History   Problem Relation Name Age of Onset    Breast cancer Maternal Grandmother       Social History     Socioeconomic History    Marital status:    Tobacco Use    Smoking status: Former     Current packs/day: 1.00     Average packs/day: 1 pack/day for 27.4 years (28.2 ttl pk-yrs)     Types: Cigarettes     Start date: 5/1/1998    Smokeless tobacco: Never    Tobacco comments:     currently taking chantix    Substance and Sexual Activity    Alcohol use: Not Currently    Drug use: Never    Sexual activity: Yes     Partners: Male     Social Drivers of Health     Financial Resource Strain: Low Risk  (8/2/2023)    Overall Financial Resource Strain (CARDIA)     Difficulty of Paying Living Expenses: Not hard at all   Recent Concern: Financial Resource Strain - High Risk (7/25/2023)    Overall Financial Resource Strain (CARDIA)     Difficulty of Paying Living Expenses: Very hard   Food Insecurity: No Food Insecurity (8/2/2023)    Hunger Vital Sign     Worried About Running Out of Food in the Last Year: Never true     Ran Out of Food in the Last Year: Never true   Recent Concern: Food  Insecurity - Food Insecurity Present (7/25/2023)    Hunger Vital Sign     Worried About Running Out of Food in the Last Year: Often true     Ran Out of Food in the Last Year: Often true   Transportation Needs: No Transportation Needs (8/2/2023)    PRAPARE - Transportation     Lack of Transportation (Medical): No     Lack of Transportation (Non-Medical): No   Physical Activity: Inactive (8/2/2023)    Exercise Vital Sign     Days of Exercise per Week: 0 days     Minutes of Exercise per Session: 0 min   Stress: Stress Concern Present (8/2/2023)    [facility] - Occupational Stress Questionnaire     Feeling of Stress : Rather much   Housing Stability: Low Risk  (8/2/2023)    Housing Stability Vital Sign     Unable to Pay for Housing in the Last Year: No     Number of Places Lived in the Last Year: 1     Unstable Housing in the Last Year: No   Recent Concern: Housing Stability - High Risk (7/25/2023)    Housing Stability Vital Sign     Unable to Pay for Housing in the Last Year: Yes     Number of Places Lived in the Last Year: 1     Unstable Housing in the Last Year: Yes     Outpatient Encounter Medications as of 12/4/2024   Medication Sig Dispense Refill    atorvastatin (LIPITOR) 40 MG tablet Take 1 tablet (40 mg total) by mouth every evening. 90 tablet 1    EScitalopram oxalate (LEXAPRO) 10 MG tablet Take 1 tablet (10 mg total) by mouth once daily. 30 tablet 3    ibuprofen (ADVIL,MOTRIN) 800 MG tablet TAKE 1 TABLET BY MOUTH EVERY 8 HOURS AS NEEDED FOR PAIN 15 tablet 0    metoprolol succinate (TOPROL-XL) 25 MG 24 hr tablet Take 1 tablet (25 mg total) by mouth once daily. 90 tablet 3    pantoprazole (PROTONIX) 40 MG tablet Take 1 tablet (40 mg total) by mouth once daily. 30 tablet 2    [DISCONTINUED] losartan (COZAAR) 25 MG tablet Take 1 tablet (25 mg total) by mouth once daily. 90 tablet 3    albuterol (PROVENTIL/VENTOLIN HFA) 90 mcg/actuation inhaler Inhale 2 puffs into the lungs every 4  (four) hours as needed for Wheezing. 18 g 3    amLODIPine (NORVASC) 10 MG tablet Take 1 tablet (10 mg total) by mouth once daily. 30 tablet 3    budesonide-formoterol 160-4.5 mcg (SYMBICORT) 160-4.5 mcg/actuation HFAA Inhale 2 puffs into the lungs every 12 (twelve) hours. Controller 10.2 g 5    methocarbamoL (ROBAXIN) 750 MG Tab Take 750 mg by mouth every 8 (eight) hours. (Patient not taking: Reported on 2024)      [] metroNIDAZOLE (FLAGYL) 500 MG tablet Take 1 tablet (500 mg total) by mouth every 12 (twelve) hours. for 7 days 14 tablet 0    nicotine (NICODERM CQ) 21 mg/24 hr Place 1 patch onto the skin once daily. (Patient not taking: Reported on 2024) 28 patch 1    nystatin (MYCOSTATIN) ointment Apply topically 2 (two) times daily. for 14 days 30 g 2    ondansetron (ZOFRAN-ODT) 4 MG TbDL Take 1 tablet (4 mg total) by mouth every 6 (six) hours as needed (Nausea). (Patient not taking: Reported on 2024) 20 tablet 0    sertraline (ZOLOFT) 50 MG tablet Take 1 tablet (50 mg total) by mouth once daily. 30 tablet 11    tiZANidine (ZANAFLEX) 4 MG tablet Take 1 tablet (4 mg total) by mouth every 8 (eight) hours. (Patient not taking: Reported on 2024) 90 tablet 1    [] influenza (Flulaval, Fluzone, Fluarix) 45 mcg/0.5 mL IM vaccine (> or = 6 mo) 0.5 mL        No facility-administered encounter medications on file as of 2024.       Review of Systems   Constitutional:  Negative for appetite change, chills and fever.   HENT:  Negative for ear pain, sinus pressure, sore throat and trouble swallowing.    Eyes:  Negative for visual disturbance.   Respiratory:  Negative for shortness of breath.    Cardiovascular:  Negative for chest pain.   Gastrointestinal:  Negative for abdominal pain, diarrhea, nausea and vomiting.   Endocrine: Negative for cold intolerance, polyphagia and polyuria.   Genitourinary:  Positive for vaginal discharge. Negative for decreased urine volume and dysuria.    Musculoskeletal:  Positive for arthralgias (bilateral foot pain). Negative for back pain.   Skin:  Negative for rash.        Lesions to bilateral feet   Allergic/Immunologic: Negative for environmental allergies and food allergies.   Neurological:  Negative for dizziness, tremors, weakness and numbness.   Hematological:  Does not bruise/bleed easily.   Psychiatric/Behavioral:  Negative for confusion and hallucinations. The patient is not nervous/anxious and is not hyperactive.    All other systems reviewed and are negative.      Objective:    Physical Exam  Constitutional:       Appearance: She is obese. She is not ill-appearing, toxic-appearing or diaphoretic.   HENT:      Right Ear: Ear canal normal.      Left Ear: Ear canal normal.      Nose: Nose normal.      Mouth/Throat:      Mouth: Mucous membranes are moist.   Cardiovascular:      Heart sounds: No murmur heard.  Pulmonary:      Effort: Pulmonary effort is normal.   Abdominal:      General: There is no distension.      Tenderness: There is no abdominal tenderness.   Musculoskeletal:      Cervical back: Normal range of motion.   Feet:      Comments: Hyperpigmented lesions to bilateral feet   Neurological:      General: No focal deficit present.      Mental Status: She is oriented to person, place, and time.      Cranial Nerves: No cranial nerve deficit.      Motor: No weakness.      Coordination: Coordination normal.      Gait: Gait normal.   Psychiatric:         Mood and Affect: Mood normal.         Behavior: Behavior normal.         Thought Content: Thought content normal.         Judgment: Judgment normal.        Pulse 83   Temp 99 °F (37.2 °C) (Oral)   Ht 5' (1.524 m)   Wt 89.8 kg (198 lb)   LMP 11/07/2024   SpO2 99%   BMI 38.67 kg/m²   Physical Exam               Results for orders placed or performed in visit on 12/04/24   C. trachomatis/N. gonorrhoeae by AMP DNA    Collection Time: 12/04/24  3:12 PM   Result Value Ref Range    Chlamydia,  Amplified DNA Not Detected Not Detected    N gonorrhoeae, amplified DNA Not Detected Not Detected   Urinalysis Microscopic    Collection Time: 12/04/24  3:12 PM   Result Value Ref Range    RBC, UA 2 0 - 4 /hpf    WBC, UA 1 0 - 5 /hpf    Bacteria Rare None-Occ /hpf    Squam Epithel, UA 6 /hpf    Microscopic Comment SEE COMMENT    Vaginosis Screen by DNA Probe    Collection Time: 12/04/24  3:12 PM   Result Value Ref Range    Bacterial vaginosis DNA Not Detected Not Detected    Candida sp Not Detected Negative    Candida glabrata/krusei Not Detected Not Detected    Trichomonas vaginalis Not Detected Not Detected     Assessment:       1. Needs flu shot    2. Procedure and treatment not carried out due to patient leaving prior to being seen by health care provider    3. Other problems related to lifestyle    4. Primary hypertension    5. Encounter for lipid screening for cardiovascular disease    6. Screening for diabetes mellitus    7. Screening examination for venereal disease    8. Screening for HIV (human immunodeficiency virus)    9. Breast cancer screening by mammogram    10. Colon cancer screening    11. Anxiety and depression    12. Severe obesity (BMI 35.0-39.9) with comorbidity    13. Foot lesion    14. Positive depression screening    15. Acute vaginitis    16. Other depression    Assessment & Plan             Plan:   Needs flu shot  -     influenza (Flulaval, Fluzone, Fluarix) 45 mcg/0.5 mL IM vaccine (> or = 6 mo) 0.5 mL    Procedure and treatment not carried out due to patient leaving prior to being seen by health care provider    Other problems related to lifestyle    Primary hypertension  -     CBC Auto Differential; Future; Expected date: 12/04/2024  -     Comprehensive Metabolic Panel; Future; Expected date: 12/04/2024    Encounter for lipid screening for cardiovascular disease  -     Lipid Panel; Future; Expected date: 12/04/2024    Screening for diabetes mellitus  -     Hemoglobin A1C; Future;  Expected date: 12/04/2024  -     TSH; Future; Expected date: 12/04/2024  -     T3, Free; Future; Expected date: 12/04/2024  -     T4, Free; Future; Expected date: 12/04/2024    Screening examination for venereal disease  -     C. trachomatis/N. gonorrhoeae by AMP DNA  -     Hepatitis Panel, Acute; Future; Expected date: 12/04/2024  -     Urinalysis Microscopic  -     Vaginosis Screen by DNA Probe  -     Treponema Pallidium Antibodies IgG, IgM; Future; Expected date: 12/04/2024    Screening for HIV (human immunodeficiency virus)  -     HIV 1/2 Ag/Ab (4th Gen); Future; Expected date: 12/04/2024    Breast cancer screening by mammogram  -     Mammo Digital Screening Bilat; Future; Expected date: 12/04/2024    Colon cancer screening  -     Cologuard Screening (Multitarget Stool DNA); Future; Expected date: 12/04/2024    Anxiety and depression  -     Ambulatory referral/consult to Primary Care Behavioral Health (Non-Opioids); Future; Expected date: 12/11/2024    Severe obesity (BMI 35.0-39.9) with comorbidity    Foot lesion  -     Ambulatory referral/consult to Podiatry; Future; Expected date: 12/11/2024    Positive depression screening  Comments:  I have reviewed the positive depression score which warrants active treatment with psychotherapy and/or medications.    Acute vaginitis  -     nystatin (MYCOSTATIN) ointment; Apply topically 2 (two) times daily. for 14 days  Dispense: 30 g; Refill: 2  -     metroNIDAZOLE (FLAGYL) 500 MG tablet; Take 1 tablet (500 mg total) by mouth every 12 (twelve) hours. for 7 days  Dispense: 14 tablet; Refill: 0    Other depression  -     sertraline (ZOLOFT) 50 MG tablet; Take 1 tablet (50 mg total) by mouth once daily.  Dispense: 30 tablet; Refill: 11       Today's encounter took a total time of 20 minutes, and that time included Preparing to see the patient (review records, tests), Obtaining and/or reviewing separately obtained historical data, Performing a medically appropriate  examination and/or evaluation , Counseling & educating the patient/family/caregiver on treatment plan with chronic health conditions , ordering medications, tests, and/or procedures, Referring and communicating with other healthcare professionals , Documenting clinical information in the electronic or other health record, Independently interpreting results & communicating results to the patient/family/caregiver.           [facility]   [address]  [place] [address]  Office [phone]  Fax [phone]   This note was generated with the assistance of ambient listening technology. Verbal consent was obtained by the patient and accompanying visitor(s) for the recording of patient appointment to facilitate this note. I attest to having reviewed and edited the generated note for accuracy, though some syntax or spelling errors may persist. Please contact the author of this note for any clarification.

## 2024-12-24 ENCOUNTER — TELEPHONE (OUTPATIENT)
Dept: PRIMARY CARE CLINIC | Facility: CLINIC | Age: 49
End: 2024-12-24
Payer: MEDICAID

## 2025-01-07 ENCOUNTER — PATIENT MESSAGE (OUTPATIENT)
Dept: BEHAVIORAL HEALTH | Facility: CLINIC | Age: 50
End: 2025-01-07
Payer: MEDICAID

## 2025-01-08 ENCOUNTER — PATIENT MESSAGE (OUTPATIENT)
Dept: ADMINISTRATIVE | Facility: HOSPITAL | Age: 50
End: 2025-01-08
Payer: MEDICAID

## 2025-01-14 ENCOUNTER — TELEPHONE (OUTPATIENT)
Dept: PRIMARY CARE CLINIC | Facility: CLINIC | Age: 50
End: 2025-01-14

## 2025-01-14 ENCOUNTER — E-VISIT (OUTPATIENT)
Dept: PRIMARY CARE CLINIC | Facility: CLINIC | Age: 50
End: 2025-01-14
Payer: MEDICAID

## 2025-01-14 DIAGNOSIS — I10 ESSENTIAL HYPERTENSION: Primary | ICD-10-CM

## 2025-01-14 PROCEDURE — 99499 UNLISTED E&M SERVICE: CPT | Mod: ,,, | Performed by: FAMILY MEDICINE

## 2025-01-14 NOTE — PROGRESS NOTES
Subjective:      Chief Complaint   Patient presents with    General Illness     Entered automatically based on patient selection in [employer].      Patient ID: Dalia Ozuna is a 49 y.o. female.  History of Present Illness          [facility]-Chronic Conditions    1/14/2025  9:04 AM CST - Filed by Patient   What do you need help with? High Blood Pressure   Do you agree to participate in an E-Visit? Yes   If you have any of the following symptoms, please do not complete an E-Visit. Instead, schedule an appointment with your provider I acknowledge   Do you have any of the following pregnancy-related conditions? None   What would you like addressed about your blood pressure? Recent blood pressure medication change   What is the main issue you would like addressed today? Elevated Blood Pressure   How would you classify your blood pressure? Hard to control   Are you having any of the following symptoms from your high blood pressure? Anxiety;  Shortness of breath   Are you taking any of the following medications? None of these   The following factors can make high blood pressure worse or harder to control. Which of them might be contributing to your high blood pressure?  Excess weight   Have you taken blood pressure medications in the past that caused you problems or side effects? No   Are you currently taking medication(s) for your blood pressure? Yes   Have you recently started a new medication or changed your dose? Yes   How often are you taking your medication per week?  Every day   Have you had any side effects from your current blood pressure medication? No   Are you able to take your blood pressure? Yes   Please give your most recent blood pressure readings    Reading 1 160/109   Reading 2 144/95   Reading 3    Reading 4    Reading 5    If you are able to take your pulse, please provide it below.    Provide any additional information you feel is important.    Please attach any relevant images or  files    Are you able to take any other vitals? Leilani Ozuna's allergies, medications, history, and problem list were updated as appropriate.  Past Medical History:   Diagnosis Date    Essential hypertension     Pneumonia      Family History   Problem Relation Name Age of Onset    Breast cancer Maternal Grandmother       Social History     Socioeconomic History    Marital status:    Tobacco Use    Smoking status: Former     Current packs/day: 1.00     Average packs/day: 1 pack/day for 27.5 years (28.3 ttl pk-yrs)     Types: Cigarettes     Start date: 5/1/1998    Smokeless tobacco: Never    Tobacco comments:     currently taking chantix    Substance and Sexual Activity    Alcohol use: Not Currently    Drug use: Never    Sexual activity: Yes     Partners: Male     Social Drivers of Health     Financial Resource Strain: Low Risk  (8/2/2023)    Overall Financial Resource Strain (CARDIA)     Difficulty of Paying Living Expenses: Not hard at all   Recent Concern: Financial Resource Strain - High Risk (7/25/2023)    Overall Financial Resource Strain (CARDIA)     Difficulty of Paying Living Expenses: Very hard   Food Insecurity: No Food Insecurity (8/2/2023)    Hunger Vital Sign     Worried About Running Out of Food in the Last Year: Never true     Ran Out of Food in the Last Year: Never true   Recent Concern: Food Insecurity - Food Insecurity Present (7/25/2023)    Hunger Vital Sign     Worried About Running Out of Food in the Last Year: Often true     Ran Out of Food in the Last Year: Often true   Transportation Needs: No Transportation Needs (8/2/2023)    PRAPARE - Transportation     Lack of Transportation (Medical): No     Lack of Transportation (Non-Medical): No   Physical Activity: Inactive (8/2/2023)    Exercise Vital Sign     Days of Exercise per Week: 0 days     Minutes of Exercise per Session: 0 min   Stress: Stress Concern Present (8/2/2023)    [employer] -  Occupational Stress Questionnaire     Feeling of Stress : Rather much   Housing Stability: Low Risk  (8/2/2023)    Housing Stability Vital Sign     Unable to Pay for Housing in the Last Year: No     Number of Places Lived in the Last Year: 1     Unstable Housing in the Last Year: No   Recent Concern: Housing Stability - High Risk (7/25/2023)    Housing Stability Vital Sign     Unable to Pay for Housing in the Last Year: Yes     Number of Places Lived in the Last Year: 1     Unstable Housing in the Last Year: Yes     Outpatient Encounter Medications as of 1/14/2025   Medication Sig Dispense Refill    albuterol (PROVENTIL/VENTOLIN HFA) 90 mcg/actuation inhaler Inhale 2 puffs into the lungs every 4 (four) hours as needed for Wheezing. 18 g 3    amLODIPine (NORVASC) 10 MG tablet Take 1 tablet (10 mg total) by mouth once daily. 30 tablet 3    atorvastatin (LIPITOR) 40 MG tablet Take 1 tablet (40 mg total) by mouth every evening. 90 tablet 1    budesonide-formoterol 160-4.5 mcg (SYMBICORT) 160-4.5 mcg/actuation HFAA Inhale 2 puffs into the lungs every 12 (twelve) hours. Controller 10.2 g 5    EScitalopram oxalate (LEXAPRO) 10 MG tablet Take 1 tablet (10 mg total) by mouth once daily. 30 tablet 3    ibuprofen (ADVIL,MOTRIN) 800 MG tablet TAKE 1 TABLET BY MOUTH EVERY 8 HOURS AS NEEDED FOR PAIN 15 tablet 0    losartan (COZAAR) 50 MG tablet Take 1 tablet (50 mg total) by mouth once daily. 90 tablet 3    methocarbamoL (ROBAXIN) 750 MG Tab Take 750 mg by mouth every 8 (eight) hours. (Patient not taking: Reported on 12/4/2024)      metoprolol succinate (TOPROL-XL) 25 MG 24 hr tablet Take 1 tablet (25 mg total) by mouth once daily. 90 tablet 3    nicotine (NICODERM CQ) 21 mg/24 hr Place 1 patch onto the skin once daily. (Patient not taking: Reported on 12/4/2024) 28 patch 1    nystatin (MYCOSTATIN) ointment Apply topically 2 (two) times daily. for 14 days 30 g 2    ondansetron (ZOFRAN-ODT) 4 MG TbDL Take 1 tablet (4 mg total) by  mouth every 6 (six) hours as needed (Nausea). (Patient not taking: Reported on 12/4/2024) 20 tablet 0    pantoprazole (PROTONIX) 40 MG tablet Take 1 tablet (40 mg total) by mouth once daily. 30 tablet 2    sertraline (ZOLOFT) 50 MG tablet Take 1 tablet (50 mg total) by mouth once daily. 30 tablet 11    tiZANidine (ZANAFLEX) 4 MG tablet Take 1 tablet (4 mg total) by mouth every 8 (eight) hours. (Patient not taking: Reported on 12/4/2024) 90 tablet 1     No facility-administered encounter medications on file as of 1/14/2025.          Objective:      There were no vitals taken for this visit.  Physical Exam   Physical Exam             Results for orders placed or performed in visit on 12/04/24   CBC Auto Differential    Collection Time: 12/04/24  3:15 PM   Result Value Ref Range    WBC 9.42 3.90 - 12.70 K/uL    RBC 4.28 4.00 - 5.40 M/uL    Hemoglobin 12.4 12.0 - 16.0 g/dL    Hematocrit 39.9 37.0 - 48.5 %    MCV 93 82 - 98 fL    MCH 29.0 27.0 - 31.0 pg    MCHC 31.1 (L) 32.0 - 36.0 g/dL    RDW 13.9 11.5 - 14.5 %    Platelets 335 150 - 450 K/uL    MPV 10.6 9.2 - 12.9 fL    Immature Granulocytes 0.2 0.0 - 0.5 %    Gran # (ANC) 5.4 1.8 - 7.7 K/uL    Immature Grans (Abs) 0.02 0.00 - 0.04 K/uL    Lymph # 3.1 1.0 - 4.8 K/uL    Mono # 0.7 0.3 - 1.0 K/uL    Eos # 0.2 0.0 - 0.5 K/uL    Baso # 0.03 0.00 - 0.20 K/uL    nRBC 0 0 /100 WBC    Gran % 57.5 38.0 - 73.0 %    Lymph % 32.9 18.0 - 48.0 %    Mono % 6.9 4.0 - 15.0 %    Eosinophil % 2.2 0.0 - 8.0 %    Basophil % 0.3 0.0 - 1.9 %    Differential Method Automated    Comprehensive Metabolic Panel    Collection Time: 12/04/24  3:15 PM   Result Value Ref Range    Sodium 137 136 - 145 mmol/L    Potassium 3.7 3.5 - 5.1 mmol/L    Chloride 107 95 - 110 mmol/L    CO2 19 (L) 23 - 29 mmol/L    Glucose 83 70 - 110 mg/dL    BUN 10 6 - 20 mg/dL    Creatinine 0.8 0.5 - 1.4 mg/dL    Calcium 9.1 8.7 - 10.5 mg/dL    Total Protein 7.5 6.0 - 8.4 g/dL    Albumin 3.9 3.5 - 5.2 g/dL    Total Bilirubin  0.3 0.1 - 1.0 mg/dL    Alkaline Phosphatase 49 40 - 150 U/L    AST 15 10 - 40 U/L    ALT 12 10 - 44 U/L    eGFR >60.0 >60 mL/min/1.73 m^2    Anion Gap 11 8 - 16 mmol/L   Lipid Panel    Collection Time: 12/04/24  3:15 PM   Result Value Ref Range    Cholesterol 148 120 - 199 mg/dL    Triglycerides 68 30 - 150 mg/dL    HDL 39 (L) 40 - 75 mg/dL    LDL Cholesterol 95.4 63.0 - 159.0 mg/dL    HDL/Cholesterol Ratio 26.4 20.0 - 50.0 %    Total Cholesterol/HDL Ratio 3.8 2.0 - 5.0    Non-HDL Cholesterol 109 mg/dL   Hemoglobin A1C    Collection Time: 12/04/24  3:15 PM   Result Value Ref Range    Hemoglobin A1C 5.6 4.0 - 5.6 %    Estimated Avg Glucose 114 68 - 131 mg/dL   TSH    Collection Time: 12/04/24  3:15 PM   Result Value Ref Range    TSH 1.038 0.400 - 4.000 uIU/mL   T3, Free    Collection Time: 12/04/24  3:15 PM   Result Value Ref Range    T3, Free 2.7 2.3 - 4.2 pg/mL   T4, Free    Collection Time: 12/04/24  3:15 PM   Result Value Ref Range    Free T4 0.83 0.71 - 1.51 ng/dL   Hepatitis Panel, Acute    Collection Time: 12/04/24  3:15 PM   Result Value Ref Range    Hepatitis B Surface Ag Non-reactive Non-reactive    Hep B C IgM Non-reactive Non-reactive    Hep A IgM Non-reactive Non-reactive    Hepatitis C Ab Non-reactive Non-reactive   HIV 1/2 Ag/Ab (4th Gen)    Collection Time: 12/04/24  3:15 PM   Result Value Ref Range    HIV 1/2 Ag/Ab Non-reactive Non-reactive   Treponema Pallidium Antibodies IgG, IgM    Collection Time: 12/04/24  3:15 PM   Result Value Ref Range    Treponema Pallidum Antibodies (IgG, IgM) Nonreactive Nonreactive     Assessment/Plan:         1. Essential hypertension      Essential hypertension  Comments:  uncontrolled despite med. DOuble up on losartan to make 100mg and RTC tomorrow for further eval.                   I have reviewed all of the patient's clinical history available in care everywhere and Epic and have utilized this in my evaluation and management recommendations today.      Treatment  options and alternatives were discussed with the patient. Patient was given ample time to ask questions. All questions were answered. Voices understanding and acceptance of this advice. Will call back if any further questions or concerns.         Portions of the record may have been created with voice recognition software. Occasional wrong-word or sound-a-like substitutions may have occurred due to the inherent limitations of voice recognition software. Read the chart carefully and recognize, using context, where substitutions have occurred.      This note was generated with the assistance of ambient listening technology. Verbal consent was obtained by the patient and accompanying visitor(s) for the recording of patient appointment to facilitate this note. I attest to having reviewed and edited the generated note for accuracy, though some syntax or spelling errors may persist. Please contact the author of this note for any clarification.            Franci Arteaga MD  [facility],

## 2025-01-15 ENCOUNTER — OFFICE VISIT (OUTPATIENT)
Dept: PRIMARY CARE CLINIC | Facility: CLINIC | Age: 50
End: 2025-01-15
Payer: MEDICAID

## 2025-01-15 VITALS
BODY MASS INDEX: 39.34 KG/M2 | HEART RATE: 74 BPM | DIASTOLIC BLOOD PRESSURE: 98 MMHG | WEIGHT: 200.38 LBS | TEMPERATURE: 98 F | OXYGEN SATURATION: 98 % | HEIGHT: 60 IN | SYSTOLIC BLOOD PRESSURE: 134 MMHG

## 2025-01-15 DIAGNOSIS — G89.29 CHRONIC RIGHT SHOULDER PAIN: Chronic | ICD-10-CM

## 2025-01-15 DIAGNOSIS — K21.9 GERD WITHOUT ESOPHAGITIS: ICD-10-CM

## 2025-01-15 DIAGNOSIS — F41.9 RECURRENT MODERATE MAJOR DEPRESSIVE DISORDER WITH ANXIETY: Chronic | ICD-10-CM

## 2025-01-15 DIAGNOSIS — F33.1 RECURRENT MODERATE MAJOR DEPRESSIVE DISORDER WITH ANXIETY: Chronic | ICD-10-CM

## 2025-01-15 DIAGNOSIS — I10 ESSENTIAL HYPERTENSION: Primary | ICD-10-CM

## 2025-01-15 DIAGNOSIS — E66.812 CLASS 2 SEVERE OBESITY WITH SERIOUS COMORBIDITY AND BODY MASS INDEX (BMI) OF 38.0 TO 38.9 IN ADULT, UNSPECIFIED OBESITY TYPE: Chronic | ICD-10-CM

## 2025-01-15 DIAGNOSIS — Z01.419 WELL WOMAN EXAM: ICD-10-CM

## 2025-01-15 DIAGNOSIS — E66.01 CLASS 2 SEVERE OBESITY WITH SERIOUS COMORBIDITY AND BODY MASS INDEX (BMI) OF 38.0 TO 38.9 IN ADULT, UNSPECIFIED OBESITY TYPE: Chronic | ICD-10-CM

## 2025-01-15 DIAGNOSIS — M25.511 CHRONIC RIGHT SHOULDER PAIN: Chronic | ICD-10-CM

## 2025-01-15 DIAGNOSIS — Z12.31 BREAST CANCER SCREENING BY MAMMOGRAM: ICD-10-CM

## 2025-01-15 DIAGNOSIS — E78.2 MIXED HYPERLIPIDEMIA: ICD-10-CM

## 2025-01-15 PROCEDURE — 1159F MED LIST DOCD IN RCRD: CPT | Mod: CPTII,,, | Performed by: FAMILY MEDICINE

## 2025-01-15 PROCEDURE — 1160F RVW MEDS BY RX/DR IN RCRD: CPT | Mod: CPTII,,, | Performed by: FAMILY MEDICINE

## 2025-01-15 PROCEDURE — 3080F DIAST BP >= 90 MM HG: CPT | Mod: CPTII,,, | Performed by: FAMILY MEDICINE

## 2025-01-15 PROCEDURE — 3008F BODY MASS INDEX DOCD: CPT | Mod: CPTII,,, | Performed by: FAMILY MEDICINE

## 2025-01-15 PROCEDURE — 4010F ACE/ARB THERAPY RXD/TAKEN: CPT | Mod: CPTII,,, | Performed by: FAMILY MEDICINE

## 2025-01-15 PROCEDURE — 99999 PR PBB SHADOW E&M-EST. PATIENT-LVL IV: CPT | Mod: PBBFAC,,, | Performed by: FAMILY MEDICINE

## 2025-01-15 PROCEDURE — 3075F SYST BP GE 130 - 139MM HG: CPT | Mod: CPTII,,, | Performed by: FAMILY MEDICINE

## 2025-01-15 PROCEDURE — 99214 OFFICE O/P EST MOD 30 MIN: CPT | Mod: PBBFAC,PN | Performed by: FAMILY MEDICINE

## 2025-01-15 PROCEDURE — 99214 OFFICE O/P EST MOD 30 MIN: CPT | Mod: S$PBB,,, | Performed by: FAMILY MEDICINE

## 2025-01-15 RX ORDER — OLMESARTAN MEDOXOMIL 40 MG/1
40 TABLET ORAL DAILY
Qty: 90 TABLET | Refills: 3 | Status: SHIPPED | OUTPATIENT
Start: 2025-01-15 | End: 2026-01-15

## 2025-01-15 RX ORDER — AMLODIPINE BESYLATE 10 MG/1
10 TABLET ORAL DAILY
Qty: 90 TABLET | Refills: 3 | Status: SHIPPED | OUTPATIENT
Start: 2025-01-15

## 2025-01-15 RX ORDER — ATORVASTATIN CALCIUM 40 MG/1
40 TABLET, FILM COATED ORAL NIGHTLY
Qty: 90 TABLET | Refills: 3 | Status: SHIPPED | OUTPATIENT
Start: 2025-01-15 | End: 2026-01-15

## 2025-01-15 RX ORDER — PANTOPRAZOLE SODIUM 40 MG/1
40 TABLET, DELAYED RELEASE ORAL DAILY
Qty: 30 TABLET | Refills: 2 | Status: SHIPPED | OUTPATIENT
Start: 2025-01-15 | End: 2026-01-15

## 2025-01-15 RX ORDER — TIZANIDINE 4 MG/1
4 TABLET ORAL EVERY 8 HOURS
Qty: 90 TABLET | Refills: 5 | Status: SHIPPED | OUTPATIENT
Start: 2025-01-15

## 2025-01-15 RX ORDER — METOPROLOL SUCCINATE 50 MG/1
50 TABLET, EXTENDED RELEASE ORAL DAILY
Start: 2025-01-15 | End: 2026-01-15

## 2025-01-15 RX ORDER — SERTRALINE HYDROCHLORIDE 50 MG/1
50 TABLET, FILM COATED ORAL DAILY
Qty: 90 TABLET | Refills: 3 | Status: SHIPPED | OUTPATIENT
Start: 2025-01-15 | End: 2025-02-01 | Stop reason: CLARIF

## 2025-01-15 RX ORDER — ESCITALOPRAM OXALATE 10 MG/1
10 TABLET ORAL DAILY
Qty: 90 TABLET | Refills: 3 | Status: SHIPPED | OUTPATIENT
Start: 2025-01-15 | End: 2025-02-01 | Stop reason: DRUGHIGH

## 2025-01-15 NOTE — PROGRESS NOTES
Subjective:      Chief Complaint   Patient presents with    Hypertension     Med refills (haven't had the amlodipine)      Patient ID: Dalia Ozuna is a 49 y.o. female.  History of Present Illness    CHIEF COMPLAINT:  Dalia presents today for follow-up on blood pressure management.    HYPERTENSION:  She reports recent home blood pressure reading of 170/98. 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 She has been out of amlodipine 10 for 2 weeks.    CARDIOVASCULAR:  She has a history of irregular heartbeat and chest pain, currently under cardiologist management. Sxs is not present today.      MENTAL HEALTH:  She takes Zoloft in the morning and Lexapro in the evening.    WEIGHT MANAGEMENT:  She currently weighs 200 lbs and has completed 4 weight loss injections without noticeable results.    MUSCULOSKELETAL:  She has ongoing left shoulder pain managed with tizanidine. She expresses reluctance toward surgical intervention due to previous negative experience with right shoulder rotator cuff surgery. Asking for muscle relaxer.    RESPIRATORY:  Her asthma symptoms have improved since smoking cessation, with decreased need for inhalers compared to when actively smoking.      ROS:  General: -fever, -chills, -fatigue, -weight gain, -weight loss  Eyes: -vision changes, -redness, -discharge  ENT: -ear pain, -nasal congestion, -sore throat  Cardiovascular: -chest pain, -palpitations, -lower extremity edema  Respiratory: -cough, -shortness of breath, -wheezing  Gastrointestinal: -abdominal pain, -nausea, -vomiting, -diarrhea, -constipation, -blood in stool  Genitourinary: -dysuria, -hematuria, -frequency  Musculoskeletal: +joint pain, -muscle pain  Skin: -rash, -lesion  Neurological: -headache, -dizziness, -numbness, -tingling  Psychiatric: -anxiety, -depression, +sleep difficulty         The 10-year ASCVD risk  score (Ayana HERNANDEZ, et al., 2019) is: 4.7%    Values used to calculate the score:      Age: 49 years      Sex: Female      Is Non- : Yes      Diabetic: No      Tobacco smoker: No      Systolic Blood Pressure: 134 mmHg      Is BP treated: Yes      HDL Cholesterol: 39 mg/dL      Total Cholesterol: 148 mg/dL    Daliaconnor Ozuna's allergies, medications, history, and problem list were updated as appropriate.  Past Medical History:   Diagnosis Date    Essential hypertension     Pneumonia      Family History   Problem Relation Name Age of Onset    Breast cancer Maternal Grandmother       Social History     Socioeconomic History    Marital status:    Tobacco Use    Smoking status: Former     Current packs/day: 1.00     Average packs/day: 1 pack/day for 27.5 years (28.3 ttl pk-yrs)     Types: Cigarettes     Start date: 5/1/1998     Passive exposure: Never    Smokeless tobacco: Never    Tobacco comments:     currently taking chantix    Substance and Sexual Activity    Alcohol use: Not Currently    Drug use: Never    Sexual activity: Yes     Partners: Male     Social Drivers of Health     Financial Resource Strain: Low Risk  (8/2/2023)    Overall Financial Resource Strain (CARDIA)     Difficulty of Paying Living Expenses: Not hard at all   Recent Concern: Financial Resource Strain - High Risk (7/25/2023)    Overall Financial Resource Strain (CARDIA)     Difficulty of Paying Living Expenses: Very hard   Food Insecurity: No Food Insecurity (8/2/2023)    Hunger Vital Sign     Worried About Running Out of Food in the Last Year: Never true     Ran Out of Food in the Last Year: Never true   Recent Concern: Food Insecurity - Food Insecurity Present (7/25/2023)    Hunger Vital Sign     Worried About Running Out of Food in the Last Year: Often true     Ran Out of Food in the Last Year: Often true   Transportation Needs: No Transportation Needs (8/2/2023)    PRAPARE - Transportation     Lack of  Transportation (Medical): No     Lack of Transportation (Non-Medical): No   Physical Activity: Inactive (8/2/2023)    Exercise Vital Sign     Days of Exercise per Week: 0 days     Minutes of Exercise per Session: 0 min   Stress: Stress Concern Present (8/2/2023)    [facility] - Occupational Stress Questionnaire     Feeling of Stress : Rather much   Housing Stability: Low Risk  (8/2/2023)    Housing Stability Vital Sign     Unable to Pay for Housing in the Last Year: No     Number of Places Lived in the Last Year: 1     Unstable Housing in the Last Year: No   Recent Concern: Housing Stability - High Risk (7/25/2023)    Housing Stability Vital Sign     Unable to Pay for Housing in the Last Year: Yes     Number of Places Lived in the Last Year: 1     Unstable Housing in the Last Year: Yes     Outpatient Encounter Medications as of 1/15/2025   Medication Sig Dispense Refill    albuterol (PROVENTIL/VENTOLIN HFA) 90 mcg/actuation inhaler Inhale 2 puffs into the lungs every 4 (four) hours as needed for Wheezing. 18 g 3    ibuprofen (ADVIL,MOTRIN) 800 MG tablet TAKE 1 TABLET BY MOUTH EVERY 8 HOURS AS NEEDED FOR PAIN 15 tablet 0    [DISCONTINUED] atorvastatin (LIPITOR) 40 MG tablet Take 1 tablet (40 mg total) by mouth every evening. 90 tablet 1    [DISCONTINUED] EScitalopram oxalate (LEXAPRO) 10 MG tablet Take 1 tablet (10 mg total) by mouth once daily. 30 tablet 3    [DISCONTINUED] losartan (COZAAR) 50 MG tablet Take 1 tablet (50 mg total) by mouth once daily. 90 tablet 3    [DISCONTINUED] metoprolol succinate (TOPROL-XL) 25 MG 24 hr tablet Take 1 tablet (25 mg total) by mouth once daily. 90 tablet 3    [DISCONTINUED] pantoprazole (PROTONIX) 40 MG tablet Take 1 tablet (40 mg total) by mouth once daily. 30 tablet 2    [DISCONTINUED] sertraline (ZOLOFT) 50 MG tablet Take 1 tablet (50 mg total) by mouth once daily. 30 tablet 11    [DISCONTINUED] tiZANidine (ZANAFLEX) 4 MG tablet Take 1 tablet (4  mg total) by mouth every 8 (eight) hours. 90 tablet 1    amLODIPine (NORVASC) 10 MG tablet Take 1 tablet (10 mg total) by mouth once daily. 90 tablet 3    atorvastatin (LIPITOR) 40 MG tablet Take 1 tablet (40 mg total) by mouth every evening. 90 tablet 3    EScitalopram oxalate (LEXAPRO) 10 MG tablet Take 1 tablet (10 mg total) by mouth once daily. 90 tablet 3    metoprolol succinate (TOPROL-XL) 50 MG 24 hr tablet Take 1 tablet (50 mg total) by mouth once daily.      olmesartan (BENICAR) 40 MG tablet Take 1 tablet (40 mg total) by mouth once daily. 90 tablet 3    pantoprazole (PROTONIX) 40 MG tablet Take 1 tablet (40 mg total) by mouth once daily. 30 tablet 2    sertraline (ZOLOFT) 50 MG tablet Take 1 tablet (50 mg total) by mouth once daily. 90 tablet 3    tiZANidine (ZANAFLEX) 4 MG tablet Take 1 tablet (4 mg total) by mouth every 8 (eight) hours. 90 tablet 5    [DISCONTINUED] amLODIPine (NORVASC) 10 MG tablet Take 1 tablet (10 mg total) by mouth once daily. (Patient not taking: Reported on 1/15/2025) 30 tablet 3    [DISCONTINUED] budesonide-formoterol 160-4.5 mcg (SYMBICORT) 160-4.5 mcg/actuation HFAA Inhale 2 puffs into the lungs every 12 (twelve) hours. Controller (Patient not taking: Reported on 1/15/2025) 10.2 g 5    [DISCONTINUED] methocarbamoL (ROBAXIN) 750 MG Tab Take 750 mg by mouth every 8 (eight) hours. (Patient not taking: Reported on 1/15/2025)      [DISCONTINUED] nicotine (NICODERM CQ) 21 mg/24 hr Place 1 patch onto the skin once daily. (Patient not taking: Reported on 6/11/2024) 28 patch 1    [DISCONTINUED] nystatin (MYCOSTATIN) ointment Apply topically 2 (two) times daily. for 14 days (Patient not taking: Reported on 1/15/2025) 30 g 2    [DISCONTINUED] ondansetron (ZOFRAN-ODT) 4 MG TbDL Take 1 tablet (4 mg total) by mouth every 6 (six) hours as needed (Nausea). (Patient not taking: Reported on 1/15/2025) 20 tablet 0     No facility-administered encounter medications on file as of 1/15/2025.           Objective:      BP (!) 134/98 (BP Location: Left arm, Patient Position: Sitting)   Pulse 74   Temp 97.7 °F (36.5 °C)   Ht 5' (1.524 m)   Wt 90.9 kg (200 lb 6.4 oz)   SpO2 98%   BMI 39.14 kg/m²   Physical Exam  Vitals and nursing note reviewed.   Constitutional:       General: She is not in acute distress.  HENT:      Head: Normocephalic and atraumatic.   Cardiovascular:      Rate and Rhythm: Normal rate and regular rhythm.      Pulses: Normal pulses.      Heart sounds: No murmur heard.  Pulmonary:      Effort: Pulmonary effort is normal. No respiratory distress.      Breath sounds: Normal breath sounds. No wheezing or rhonchi.   Musculoskeletal:         General: No swelling or deformity.      Right lower leg: No edema.      Left lower leg: No edema.   Neurological:      General: No focal deficit present.      Mental Status: She is alert.      Cranial Nerves: No cranial nerve deficit.   Psychiatric:         Behavior: Behavior normal.         Thought Content: Thought content normal.                     Results for orders placed or performed in visit on 12/04/24   CBC Auto Differential    Collection Time: 12/04/24  3:15 PM   Result Value Ref Range    WBC 9.42 3.90 - 12.70 K/uL    RBC 4.28 4.00 - 5.40 M/uL    Hemoglobin 12.4 12.0 - 16.0 g/dL    Hematocrit 39.9 37.0 - 48.5 %    MCV 93 82 - 98 fL    MCH 29.0 27.0 - 31.0 pg    MCHC 31.1 (L) 32.0 - 36.0 g/dL    RDW 13.9 11.5 - 14.5 %    Platelets 335 150 - 450 K/uL    MPV 10.6 9.2 - 12.9 fL    Immature Granulocytes 0.2 0.0 - 0.5 %    Gran # (ANC) 5.4 1.8 - 7.7 K/uL    Immature Grans (Abs) 0.02 0.00 - 0.04 K/uL    Lymph # 3.1 1.0 - 4.8 K/uL    Mono # 0.7 0.3 - 1.0 K/uL    Eos # 0.2 0.0 - 0.5 K/uL    Baso # 0.03 0.00 - 0.20 K/uL    nRBC 0 0 /100 WBC    Gran % 57.5 38.0 - 73.0 %    Lymph % 32.9 18.0 - 48.0 %    Mono % 6.9 4.0 - 15.0 %    Eosinophil % 2.2 0.0 - 8.0 %    Basophil % 0.3 0.0 - 1.9 %    Differential Method Automated    Comprehensive Metabolic Panel     Collection Time: 12/04/24  3:15 PM   Result Value Ref Range    Sodium 137 136 - 145 mmol/L    Potassium 3.7 3.5 - 5.1 mmol/L    Chloride 107 95 - 110 mmol/L    CO2 19 (L) 23 - 29 mmol/L    Glucose 83 70 - 110 mg/dL    BUN 10 6 - 20 mg/dL    Creatinine 0.8 0.5 - 1.4 mg/dL    Calcium 9.1 8.7 - 10.5 mg/dL    Total Protein 7.5 6.0 - 8.4 g/dL    Albumin 3.9 3.5 - 5.2 g/dL    Total Bilirubin 0.3 0.1 - 1.0 mg/dL    Alkaline Phosphatase 49 40 - 150 U/L    AST 15 10 - 40 U/L    ALT 12 10 - 44 U/L    eGFR >60.0 >60 mL/min/1.73 m^2    Anion Gap 11 8 - 16 mmol/L   Lipid Panel    Collection Time: 12/04/24  3:15 PM   Result Value Ref Range    Cholesterol 148 120 - 199 mg/dL    Triglycerides 68 30 - 150 mg/dL    HDL 39 (L) 40 - 75 mg/dL    LDL Cholesterol 95.4 63.0 - 159.0 mg/dL    HDL/Cholesterol Ratio 26.4 20.0 - 50.0 %    Total Cholesterol/HDL Ratio 3.8 2.0 - 5.0    Non-HDL Cholesterol 109 mg/dL   Hemoglobin A1C    Collection Time: 12/04/24  3:15 PM   Result Value Ref Range    Hemoglobin A1C 5.6 4.0 - 5.6 %    Estimated Avg Glucose 114 68 - 131 mg/dL   TSH    Collection Time: 12/04/24  3:15 PM   Result Value Ref Range    TSH 1.038 0.400 - 4.000 uIU/mL   T3, Free    Collection Time: 12/04/24  3:15 PM   Result Value Ref Range    T3, Free 2.7 2.3 - 4.2 pg/mL   T4, Free    Collection Time: 12/04/24  3:15 PM   Result Value Ref Range    Free T4 0.83 0.71 - 1.51 ng/dL   Hepatitis Panel, Acute    Collection Time: 12/04/24  3:15 PM   Result Value Ref Range    Hepatitis B Surface Ag Non-reactive Non-reactive    Hep B C IgM Non-reactive Non-reactive    Hep A IgM Non-reactive Non-reactive    Hepatitis C Ab Non-reactive Non-reactive   HIV 1/2 Ag/Ab (4th Gen)    Collection Time: 12/04/24  3:15 PM   Result Value Ref Range    HIV 1/2 Ag/Ab Non-reactive Non-reactive   Treponema Pallidium Antibodies IgG, IgM    Collection Time: 12/04/24  3:15 PM   Result Value Ref Range    Treponema Pallidum Antibodies (IgG, IgM) Nonreactive Nonreactive      Assessment/Plan:         1. Essential hypertension    2. Well woman exam    3. Breast cancer screening by mammogram    4. Mixed hyperlipidemia    5. GERD without esophagitis    6. Recurrent moderate major depressive disorder with anxiety    7. Chronic right shoulder pain    8. Class 2 severe obesity with serious comorbidity and body mass index (BMI) of 38.0 to 38.9 in adult, unspecified obesity type    9. BMI 39.0-39.9,adult      Essential hypertension  Comments:  Not quite controlled, restart all your meds and RTC in 2 weeks for BP check  Orders:  -     amLODIPine (NORVASC) 10 MG tablet; Take 1 tablet (10 mg total) by mouth once daily.  Dispense: 90 tablet; Refill: 3  -     metoprolol succinate (TOPROL-XL) 50 MG 24 hr tablet; Take 1 tablet (50 mg total) by mouth once daily.  -     olmesartan (BENICAR) 40 MG tablet; Take 1 tablet (40 mg total) by mouth once daily.  Dispense: 90 tablet; Refill: 3    Well woman exam  -     Ambulatory referral/consult to Gynecology; Future; Expected date: 01/22/2025    Breast cancer screening by mammogram  -     Mammo Digital Screening Bilat w/ [facility]; Future; Expected date: 01/15/2025    Mixed hyperlipidemia  -     atorvastatin (LIPITOR) 40 MG tablet; Take 1 tablet (40 mg total) by mouth every evening.  Dispense: 90 tablet; Refill: 3    GERD without esophagitis  -     pantoprazole (PROTONIX) 40 MG tablet; Take 1 tablet (40 mg total) by mouth once daily.  Dispense: 30 tablet; Refill: 2    Recurrent moderate major depressive disorder with anxiety  Comments:  stable  Orders:  -     sertraline (ZOLOFT) 50 MG tablet; Take 1 tablet (50 mg total) by mouth once daily.  Dispense: 90 tablet; Refill: 3  -     EScitalopram oxalate (LEXAPRO) 10 MG tablet; Take 1 tablet (10 mg total) by mouth once daily.  Dispense: 90 tablet; Refill: 3    Chronic right shoulder pain  Comments:  stable and pain is controlled  Orders:  -     tiZANidine (ZANAFLEX) 4 MG tablet; Take 1 tablet (4 mg total) by mouth  "every 8 (eight) hours.  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    BMI 39.0-39.9,adult  Comments[facility]  [facility]      HYPERTENSION:  - Evaluated blood pressure control and medication regimen.  - Considered switching from Losartan to Olmesartan for improved efficacy based on recent studies.  - Assessed use of Metoprolol for blood pressure and heart protection.  - Explained that elevated blood pressure is often referred to as a "silent killer" as it can cause damage before symptoms develop.  - Prescribed Olmesartan, replacing Losartan 25mg, for improved blood pressure control.  - Continued Metoprolol 50mg at night for blood pressure and heart protection.  - Instructed nurse to check blood pressure in 2 weeks.  - Advised that if blood pressure is controlled at 2-week check, next follow-up visit will be postponed.  - Scheduled follow up in 1 month if blood pressure is not controlled at 2-week check.  - Noted that the patient's blood pressure was 170/98 yesterday.  - Dalia reported blood pressure was still elevated on 10mg dose of medication.  - Evaluated sleep patterns and potential impact on blood pressure.  - Explained importance of adequate sleep and its impact on stress hormones and weight management.  - Dalia to implement specific lifestyle changes for blood pressure control, including: improve sleep patterns despite challenging work schedule, incorporate stress-reducing activities like yoga or bedside stretching, and increase water intake, particularly when using weight loss injections.    CARDIAC ISSUES:  - Noted patient's history of irregular heartbeat and chest pain.  - Auscultated patient's heart.  - Continued Metoprolol 50mg for heart issues, " which was increased from 25mg.  - Advised continuing Metoprolol as it protects the heart.    ASTHMA:  - Confirmed patient's asthma diagnosis.  - Noted patient has not used inhalers since quitting smoking.  - Acknowledged that smoking cessation has improved asthma symptoms.        - Assessed use of Metoprolol for anxiety management.  - Noted patient is taking Metoprolol, which helps with anxiety.    SMOKING CESSATION:  - Noted patient reports having quit smoking.  - Observed that patient's asthma symptoms have improved since quitting smoking.  - Acknowledged the positive effects of smoking cessation.    WEIGHT MANAGEMENT:  - Reviewed weight management efforts in context of medication side effects.  - Discussed relationship between stress, negative hormones, and weight loss resistance.    SHOULDER ISSUES:  - Educated on potential for rotator cuff surgery to improve quality of life.  - Continued Tizanidine at night for muscle relaxation, particularly for shoulder issues.    MEDICATIONS/SUPPLEMENTS:  - Continued Zoloft in the morning.  - Continued Lexapro in the evening.  - Refilled Lexapro and Zoloft prescriptions.    OTHER INSTRUCTIONS:  - Considered impact of work schedule   on overall health and blood pressure management.            I have reviewed all of the patient's clinical history available in care everywhere and Epic and have utilized this in my evaluation and management recommendations today.      Treatment options and alternatives were discussed with the patient. Patient was given ample time to ask questions. All questions were answered. Voices understanding and acceptance of this advice. Will call back if any further questions or concerns.       Portions of the record may have been created with voice recognition software. Occasional wrong-word or sound-a-like substitutions may have occurred due to the inherent limitations of voice recognition software. Read the chart carefully and recognize, using  context, where substitutions have occurred.      This note was generated with the assistance of ambient listening technology. Verbal consent was obtained by the patient and accompanying visitor(s) for the recording of patient appointment to facilitate this note. I attest to having reviewed and edited the generated note for accuracy, though some syntax or spelling errors may persist. Please contact the author of this note for any clarification.            Franci Arteaga MD  [facility],

## 2025-01-15 NOTE — PATIENT INSTRUCTIONS
DASH Diet   About this topic   DASH stands for Dietary Approaches to Stop Hypertension. The DASH diet may help you lower blood pressure. It may also help keep you from getting high blood pressure. You will eat less fat and more fiber on the DASH diet.  This diet gives you more minerals that fight high blood pressure. Some nutrients in this diet are:  Potassium ? Acts to help you get rid of salt. This may help to lower blood pressure.  Calcium ? Makes blood vessels and muscles work the right way  Magnesium - Helps blood vessels relax  Fiber ? Helps you feel full. It also helps digestion.  What will the results be?   The DASH diet may help you:  Lower your blood pressure and cholesterol  Lower your risk for cancer, heart disease, heart attack, and stroke. It may also lower your risk for heart failure, kidney stones, and diabetes.  Lose weight or keep a healthy weight  What lifestyle changes are needed?   Add regular exercise to get the most help from this diet.  Try to lower stress. Find ways to relax.  Stop smoking. Avoid secondhand smoke.  Limit alcohol intake.  What changes to diet are needed?   Know about poor eating habits. Then, you can fix them as you work with the program.  This diet encourages fruits and vegetables, whole grains, lean meats, healthy fats, and low-fat or fat-free dairy products.  This diet is lower in saturated fats, trans-fats, cholesterol, added sugars, and sodium.  Who should use this diet?   This eating plan is good for the whole family. It is also good for people with high blood pressure and those at risk for high blood pressure.  What foods are good to eat?   Grains: Try to eat 6 to 8 servings of whole grain, high fiber foods each day. These are bread, cereals, brown rice, or pasta.  Fruits and vegetables: Eat 4 to 5 servings each day. Try to pick many kinds and colors. Fresh or frozen are best. Look for low sodium or salt-free if you choose canned.  Dairy: Try to eat 2 to 3 servings of  fat free and low fat milk products each day.  Lean meats, poultry, and seafood: Try to eat 6 servings or less of lean meats, poultry, and seafood each day. Try to choose more low fat or lean meats like chicken and turkey. Eat less red meat. Eat more fish instead.  Nuts, seeds, and legumes (dry beans and peas): Try to eat 4 to 5 servings each week. Try to pick nuts such as almonds and walnuts, sunflower seeds, peanut butter, soy beans, lentils, kidney beans, and split peas.  Fats and oils: Try to eat 2 to 3 servings of fats and oils each day. Eat good fats found in fish, nuts, and avocados. Try using olive oil or vegetable oils such as canola oil. Other good oils to try are corn, safflower, sunflower, or soybean oils. Use low-sodium and low-fat salad dressing and mayonnaise.  Condiments: Pepper, herbs, spices, vinegar, lemon or lime juices are great for seasoning. Be careful to choose low-sodium or salt-free products if you use broths, soups, or soy sauce.  Sweets: Try to eat less than 5 servings each week. Choose low-fat and trans fat-free desserts. These are things like fruit flavored gelatin, sorbet, jelly beans, mikki crackers, animal crackers, low-fat fig bars, and zahra snaps. Eat fruit to satisfy your desire for sweets.     What foods should be limited or avoided?   Grains: Salted breads, rolls, crackers, quick breads, self-rising flours, biscuit mixes, regular bread crumbs

## 2025-01-30 ENCOUNTER — CLINICAL SUPPORT (OUTPATIENT)
Dept: PRIMARY CARE CLINIC | Facility: CLINIC | Age: 50
End: 2025-01-30
Payer: MEDICAID

## 2025-01-30 VITALS — SYSTOLIC BLOOD PRESSURE: 124 MMHG | DIASTOLIC BLOOD PRESSURE: 86 MMHG

## 2025-01-30 DIAGNOSIS — I10 ESSENTIAL HYPERTENSION: Primary | ICD-10-CM

## 2025-01-30 NOTE — PROGRESS NOTES
Patient presented for Bp check bp  124/86. Patient released, advised to continue medication as prescribed ,. She voiced understanding.

## 2025-02-01 ENCOUNTER — OFFICE VISIT (OUTPATIENT)
Dept: PRIMARY CARE CLINIC | Facility: CLINIC | Age: 50
End: 2025-02-01
Payer: MEDICAID

## 2025-02-01 DIAGNOSIS — F41.1 GENERALIZED ANXIETY DISORDER WITH PANIC ATTACKS: Chronic | ICD-10-CM

## 2025-02-01 DIAGNOSIS — F41.0 GENERALIZED ANXIETY DISORDER WITH PANIC ATTACKS: Chronic | ICD-10-CM

## 2025-02-01 DIAGNOSIS — M54.50 ACUTE RIGHT-SIDED LOW BACK PAIN WITHOUT SCIATICA: ICD-10-CM

## 2025-02-01 DIAGNOSIS — I10 ESSENTIAL HYPERTENSION: Chronic | ICD-10-CM

## 2025-02-01 DIAGNOSIS — G89.29 CHRONIC RIGHT SHOULDER PAIN: Chronic | ICD-10-CM

## 2025-02-01 DIAGNOSIS — F41.9 RECURRENT MODERATE MAJOR DEPRESSIVE DISORDER WITH ANXIETY: Primary | Chronic | ICD-10-CM

## 2025-02-01 DIAGNOSIS — F33.1 RECURRENT MODERATE MAJOR DEPRESSIVE DISORDER WITH ANXIETY: Primary | Chronic | ICD-10-CM

## 2025-02-01 DIAGNOSIS — G47.09 SECONDARY INSOMNIA: Chronic | ICD-10-CM

## 2025-02-01 DIAGNOSIS — E66.01 CLASS 2 SEVERE OBESITY WITH SERIOUS COMORBIDITY AND BODY MASS INDEX (BMI) OF 38.0 TO 38.9 IN ADULT, UNSPECIFIED OBESITY TYPE: ICD-10-CM

## 2025-02-01 DIAGNOSIS — M25.562 ACUTE PAIN OF BOTH KNEES: ICD-10-CM

## 2025-02-01 DIAGNOSIS — E66.812 CLASS 2 SEVERE OBESITY WITH SERIOUS COMORBIDITY AND BODY MASS INDEX (BMI) OF 38.0 TO 38.9 IN ADULT, UNSPECIFIED OBESITY TYPE: ICD-10-CM

## 2025-02-01 DIAGNOSIS — M25.511 CHRONIC RIGHT SHOULDER PAIN: Chronic | ICD-10-CM

## 2025-02-01 DIAGNOSIS — M25.561 ACUTE PAIN OF BOTH KNEES: ICD-10-CM

## 2025-02-01 PROBLEM — R07.89 ATYPICAL CHEST PAIN: Status: RESOLVED | Noted: 2023-07-24 | Resolved: 2025-02-01

## 2025-02-01 PROBLEM — M79.18 MYOFASCIAL PAIN ON LEFT SIDE: Status: RESOLVED | Noted: 2021-10-22 | Resolved: 2025-02-01

## 2025-02-01 PROBLEM — R06.09 DYSPNEA ON EXERTION: Status: RESOLVED | Noted: 2023-10-13 | Resolved: 2025-02-01

## 2025-02-01 PROCEDURE — 1160F RVW MEDS BY RX/DR IN RCRD: CPT | Mod: CPTII,95,, | Performed by: FAMILY MEDICINE

## 2025-02-01 PROCEDURE — 1159F MED LIST DOCD IN RCRD: CPT | Mod: CPTII,95,, | Performed by: FAMILY MEDICINE

## 2025-02-01 PROCEDURE — 98006 SYNCH AUDIO-VIDEO EST MOD 30: CPT | Mod: 95,,, | Performed by: FAMILY MEDICINE

## 2025-02-01 PROCEDURE — 4010F ACE/ARB THERAPY RXD/TAKEN: CPT | Mod: CPTII,95,, | Performed by: FAMILY MEDICINE

## 2025-02-01 RX ORDER — ESCITALOPRAM OXALATE 20 MG/1
20 TABLET ORAL DAILY
Qty: 30 TABLET | Refills: 5 | Status: SHIPPED | OUTPATIENT
Start: 2025-02-01

## 2025-02-01 RX ORDER — IBUPROFEN 800 MG/1
800 TABLET ORAL EVERY 12 HOURS PRN
Qty: 60 TABLET | Refills: 2 | Status: SHIPPED | OUTPATIENT
Start: 2025-02-01

## 2025-02-01 RX ORDER — MIRTAZAPINE 15 MG/1
15 TABLET, FILM COATED ORAL NIGHTLY
Qty: 30 TABLET | Refills: 2 | Status: SHIPPED | OUTPATIENT
Start: 2025-02-01 | End: 2025-03-04 | Stop reason: SDUPTHER

## 2025-02-01 RX ORDER — SEMAGLUTIDE 1.34 MG/ML
1 INJECTION, SOLUTION SUBCUTANEOUS
Start: 2025-02-01 | End: 2025-02-23 | Stop reason: DRUGHIGH

## 2025-02-01 RX ORDER — METHOCARBAMOL 750 MG/1
750 TABLET, FILM COATED ORAL 3 TIMES DAILY PRN
Qty: 90 TABLET | Refills: 2 | Status: SHIPPED | OUTPATIENT
Start: 2025-02-01

## 2025-02-01 NOTE — PROGRESS NOTES
The patient location is: [place] at work  Visit type: Virtual visit with synchronous audio and video  Total time spent with patient: 45  mins  This includes face to face time and non-face to face time preparing to see the patient (eg, review of tests), obtaining and/or reviewing separately obtained history, documenting clinical information in the electronic or other health record, independently interpreting results and communicating results to the patient/family/caregiver, or care coordinator.   Each patient to whom he or she provides medical services by telemedicine is:  (1) informed of the relationship between the physician and patient and the respective role of any other health care provider with respect to management of the patient; and (2) notified that he or she may decline to receive medical services by telemedicine and may withdraw from such care at any time.  Subjective:      Chief Complaint   Patient presents with    Depression    Hypertension    Anxiety      Patient ID: Dalia Ozuna is a 49 y.o. female.  History of Present Illness    DEPRESSION AND ANXIETY:   She reports experiencing severe depression with bad thoughts, uncontrollable crying episodes, and emotional outbursts triggered by various stimuli last month. She describes frequent panic episodes, particularly when overthinking or experiencing fearful thoughts, with difficulty controlling these episodes. She has been taking Zoloft in the morning and Lexapro in the evening. Noticed mood improvement when she stopped taking zoloft. However, she has not taken Zoloft for the past two days and can tell that she is feeling better    BLOOD PRESSURE:  She reports a blood pressure reading of 124/86 with improvement noted since medication adjustment.    SLEEP:  She reports poor sleep quality with disrupted schedule, experiencing multiple awakenings 3-4 times per night. She needs to wake up at 5:00 AM, which contributes to daytime fatigue due to  "insufficient rest. But reports that if she takes tizanidine, it "knocks her out completely".    MUSCULOSKELETAL PAIN:  She reports bilateral knee pain affecting mobility, left hip pain disturbing sleep, and lower back pain exacerbated by prolonged standing and movement, requiring her to sit for relief.    MEDICATIONS:  She takes Tizanidine at night with reported significant drowsiness, and Tylenol as needed for pain management. She reports new back pain, right side, knee pain and bottom of her feet burns.    WEIGHT:  She reports recent weight gain of 4 lbs with current weight of 204 lbs. So far she has used 4 out of the 7 injections she got from a health clinic.  She will reach out to them for titration of her dose.      Answers submitted by the patient for this visit:  Back Pain Questionnaire (Submitted on 2/1/2025)  Chief Complaint: Back pain  Chronicity: new  Onset: in the past 7 days  Frequency: 2 to 4 times per day  Progression since onset: waxing and waning  Pain location: lumbar spine  Pain quality: aching  Radiates to: left knee, right knee  Pain - numeric: 10/10  Pain is: the same all the time  Aggravated by: position, standing  Stiffness is present: in the morning, at night  abdominal pain: No  bladder incontinence: No  bowel incontinence: No  chest pain: No  dysuria: No  fever: No  headaches: No  leg pain: No  numbness: No  paresis: No  paresthesias: No  pelvic pain: No  perianal numbness: No  tingling: No  weakness: No  weight loss: No  genital pain: No  hematuria: No  Risk factors: history of renal stones  Pain severity: severe  Improvement on treatment: no relief    ROS:  General: -fever, -chills, +fatigue, +weight gain, -weight loss  Eyes: -vision changes, -redness, -discharge  ENT: -ear pain, -nasal congestion, -sore throat  Cardiovascular: -chest pain, -palpitations, -lower extremity edema  Respiratory: -cough, -shortness of breath  Gastrointestinal: -abdominal pain, -nausea, -vomiting, -diarrhea, " -constipation, -blood in stool  Genitourinary: -dysuria, -hematuria, -frequency  Musculoskeletal: +joint pain, -muscle pain  Skin: -rash, -lesion  Neurological: -headache, -dizziness, -numbness, -tingling  Psychiatric: +anxiety, +depression, -sleep difficulty       Dalia Ozuna's allergies, medications, history, and problem list were updated as appropriate.  Past Medical History:   Diagnosis Date    Atypical chest pain 07/24/2023    Dyspnea on exertion 10/13/2023    Essential hypertension     Myofascial pain on left side 10/22/2021    Pneumonia      Family History   Problem Relation Name Age of Onset    Breast cancer Maternal Grandmother       Social History     Socioeconomic History    Marital status:    Tobacco Use    Smoking status: Former     Current packs/day: 1.00     Average packs/day: 1 pack/day for 27.6 years (28.4 ttl pk-yrs)     Types: Cigarettes     Start date: 5/1/1998     Passive exposure: Never    Smokeless tobacco: Never    Tobacco comments:     currently taking chantix    Substance and Sexual Activity    Alcohol use: Not Currently    Drug use: Never    Sexual activity: Yes     Partners: Male     Social Drivers of Health     Financial Resource Strain: High Risk (2/1/2025)    Overall Financial Resource Strain (CARDIA)     Difficulty of Paying Living Expenses: Hard   Food Insecurity: Food Insecurity Present (2/1/2025)    Hunger Vital Sign     Worried About Running Out of Food in the Last Year: Sometimes true     Ran Out of Food in the Last Year: Sometimes true   Transportation Needs: No Transportation Needs (8/2/2023)    PRAPARE - Transportation     Lack of Transportation (Medical): No     Lack of Transportation (Non-Medical): No   Physical Activity: Sufficiently Active (2/1/2025)    Exercise Vital Sign     Days of Exercise per Week: 7 days     Minutes of Exercise per Session: 30 min   Stress: Stress Concern Present (2/1/2025)    [employer] [facility] [employer] - Occupational  Stress Questionnaire     Feeling of Stress : Very much   Housing Stability: Low Risk  (8/2/2023)    Housing Stability Vital Sign     Unable to Pay for Housing in the Last Year: No     Number of Places Lived in the Last Year: 1     Unstable Housing in the Last Year: No   Recent Concern: Housing Stability - High Risk (7/25/2023)    Housing Stability Vital Sign     Unable to Pay for Housing in the Last Year: Yes     Number of Places Lived in the Last Year: 1     Unstable Housing in the Last Year: Yes     Outpatient Encounter Medications as of 2/1/2025   Medication Sig Dispense Refill    albuterol (PROVENTIL/VENTOLIN HFA) 90 mcg/actuation inhaler Inhale 2 puffs into the lungs every 4 (four) hours as needed for Wheezing. 18 g 3    amLODIPine (NORVASC) 10 MG tablet Take 1 tablet (10 mg total) by mouth once daily. 90 tablet 3    atorvastatin (LIPITOR) 40 MG tablet Take 1 tablet (40 mg total) by mouth every evening. 90 tablet 3    EScitalopram oxalate (LEXAPRO) 20 MG tablet Take 1 tablet (20 mg total) by mouth once daily. 30 tablet 5    ibuprofen (ADVIL,MOTRIN) 800 MG tablet Take 1 tablet (800 mg total) by mouth every 12 (twelve) hours as needed for Pain. 60 tablet 2    methocarbamoL (ROBAXIN) 750 MG Tab Take 1 tablet (750 mg total) by mouth 3 (three) times daily as needed (pain). 90 tablet 2    metoprolol succinate (TOPROL-XL) 50 MG 24 hr tablet Take 1 tablet (50 mg total) by mouth once daily.      mirtazapine (REMERON) 15 MG tablet Take 1 tablet (15 mg total) by mouth every evening. 30 tablet 2    olmesartan (BENICAR) 40 MG tablet Take 1 tablet (40 mg total) by mouth once daily. 90 tablet 3    pantoprazole (PROTONIX) 40 MG tablet Take 1 tablet (40 mg total) by mouth once daily. 30 tablet 2    semaglutide (OZEMPIC) 1 mg/dose (4 mg/3 mL) Inject 1 mg into the skin every 7 days.      tiZANidine (ZANAFLEX) 4 MG tablet Take 1 tablet (4 mg total) by mouth every 8 (eight) hours. 90 tablet 5    [DISCONTINUED] EScitalopram oxalate  (LEXAPRO) 10 MG tablet Take 1 tablet (10 mg total) by mouth once daily. 90 tablet 3    [DISCONTINUED] ibuprofen (ADVIL,MOTRIN) 800 MG tablet TAKE 1 TABLET BY MOUTH EVERY 8 HOURS AS NEEDED FOR PAIN 15 tablet 0    [DISCONTINUED] sertraline (ZOLOFT) 50 MG tablet Take 1 tablet (50 mg total) by mouth once daily. 90 tablet 3     No facility-administered encounter medications on file as of 2/1/2025.          Objective:      There were no vitals taken for this visit.  Physical Exam  Psychiatric:         Thought Content: Thought content does not include suicidal ideation. Thought content does not include homicidal or suicidal plan.        Physical Exam    Vitals: Blood pressure: 124/86. Weight: 204 lbs.      CONSTITUTIONAL: No apparent distress. Appears comfortable. Does not appear acutely ill or septic. Appears adequately hydrated.  CARDIOVASCULAR: No perioral cyanosis  PULMONARY: Breathing unlabored. No retractions Chest expansion grossly normal.  PSYCHIATRIC: Alert and conversant and grossly oriented. Mood is grossly neutral. Affect appropriate. Judgment and insight grossly intact.  NEUROLOGIC: No focal sensory deficits reported.   Results for orders placed or performed in visit on 12/04/24   CBC Auto Differential    Collection Time: 12/04/24  3:15 PM   Result Value Ref Range    WBC 9.42 3.90 - 12.70 K/uL    RBC 4.28 4.00 - 5.40 M/uL    Hemoglobin 12.4 12.0 - 16.0 g/dL    Hematocrit 39.9 37.0 - 48.5 %    MCV 93 82 - 98 fL    MCH 29.0 27.0 - 31.0 pg    MCHC 31.1 (L) 32.0 - 36.0 g/dL    RDW 13.9 11.5 - 14.5 %    Platelets 335 150 - 450 K/uL    MPV 10.6 9.2 - 12.9 fL    Immature Granulocytes 0.2 0.0 - 0.5 %    Gran # (ANC) 5.4 1.8 - 7.7 K/uL    Immature Grans (Abs) 0.02 0.00 - 0.04 K/uL    Lymph # 3.1 1.0 - 4.8 K/uL    Mono # 0.7 0.3 - 1.0 K/uL    Eos # 0.2 0.0 - 0.5 K/uL    Baso # 0.03 0.00 - 0.20 K/uL    nRBC 0 0 /100 WBC    Gran % 57.5 38.0 - 73.0 %    Lymph % 32.9 18.0 - 48.0 %    Mono % 6.9 4.0 - 15.0 %    Eosinophil %  2.2 0.0 - 8.0 %    Basophil % 0.3 0.0 - 1.9 %    Differential Method Automated    Comprehensive Metabolic Panel    Collection Time: 12/04/24  3:15 PM   Result Value Ref Range    Sodium 137 136 - 145 mmol/L    Potassium 3.7 3.5 - 5.1 mmol/L    Chloride 107 95 - 110 mmol/L    CO2 19 (L) 23 - 29 mmol/L    Glucose 83 70 - 110 mg/dL    BUN 10 6 - 20 mg/dL    Creatinine 0.8 0.5 - 1.4 mg/dL    Calcium 9.1 8.7 - 10.5 mg/dL    Total Protein 7.5 6.0 - 8.4 g/dL    Albumin 3.9 3.5 - 5.2 g/dL    Total Bilirubin 0.3 0.1 - 1.0 mg/dL    Alkaline Phosphatase 49 40 - 150 U/L    AST 15 10 - 40 U/L    ALT 12 10 - 44 U/L    eGFR >60.0 >60 mL/min/1.73 m^2    Anion Gap 11 8 - 16 mmol/L   Lipid Panel    Collection Time: 12/04/24  3:15 PM   Result Value Ref Range    Cholesterol 148 120 - 199 mg/dL    Triglycerides 68 30 - 150 mg/dL    HDL 39 (L) 40 - 75 mg/dL    LDL Cholesterol 95.4 63.0 - 159.0 mg/dL    HDL/Cholesterol Ratio 26.4 20.0 - 50.0 %    Total Cholesterol/HDL Ratio 3.8 2.0 - 5.0    Non-HDL Cholesterol 109 mg/dL   Hemoglobin A1C    Collection Time: 12/04/24  3:15 PM   Result Value Ref Range    Hemoglobin A1C 5.6 4.0 - 5.6 %    Estimated Avg Glucose 114 68 - 131 mg/dL   TSH    Collection Time: 12/04/24  3:15 PM   Result Value Ref Range    TSH 1.038 0.400 - 4.000 uIU/mL   T3, Free    Collection Time: 12/04/24  3:15 PM   Result Value Ref Range    T3, Free 2.7 2.3 - 4.2 pg/mL   T4, Free    Collection Time: 12/04/24  3:15 PM   Result Value Ref Range    Free T4 0.83 0.71 - 1.51 ng/dL   Hepatitis Panel, Acute    Collection Time: 12/04/24  3:15 PM   Result Value Ref Range    Hepatitis B Surface Ag Non-reactive Non-reactive    Hep B C IgM Non-reactive Non-reactive    Hep A IgM Non-reactive Non-reactive    Hepatitis C Ab Non-reactive Non-reactive   HIV 1/2 Ag/Ab (4th Gen)    Collection Time: 12/04/24  3:15 PM   Result Value Ref Range    HIV 1/2 Ag/Ab Non-reactive Non-reactive   Treponema Pallidium Antibodies IgG, IgM    Collection Time:  12/04/24  3:15 PM   Result Value Ref Range    Treponema Pallidum Antibodies (IgG, IgM) Nonreactive Nonreactive     Assessment/Plan:         1. Recurrent moderate major depressive disorder with anxiety    2. Generalized anxiety disorder with panic attacks    3. Essential hypertension    4. BMI 39.0-39.9,adult    5. Class 2 severe obesity with serious comorbidity and body mass index (BMI) of 38.0 to 38.9 in adult, unspecified obesity type    6. Acute pain of both knees    7. Acute right-sided low back pain without sciatica    8. Secondary insomnia    9. Chronic right shoulder pain      Recurrent moderate major depressive disorder with anxiety  Comments:  active, she has referral to psych for counseling but reports that her schedule does not allow, see new med    Generalized anxiety disorder with panic attacks  Comments:  not quite controlled, increased lexapro to 20 mg  Orders:  -     EScitalopram oxalate (LEXAPRO) 20 MG tablet; Take 1 tablet (20 mg total) by mouth once daily.  Dispense: 30 tablet; Refill: 5    Essential hypertension  Comments:  controlled    BMI 39.0-39.9,adult  Comments:  -worseningf, advised on lifestyle modification in addition to med to see result.  Orders:  -     semaglutide (OZEMPIC) 1 mg/dose (4 mg/3 mL); Inject 1 mg into the skin every 7 days.    Class 2 severe obesity with serious comorbidity and body mass index (BMI) of 38.0 to 38.9 in adult, unspecified obesity type  -     semaglutide (OZEMPIC) 1 mg/dose (4 mg/3 mL); Inject 1 mg into the skin every 7 days.    Acute pain of both knees  Comments:  new, needs further eval in the clinic, cont conservative therapy  Orders:  -     methocarbamoL (ROBAXIN) 750 MG Tab; Take 1 tablet (750 mg total) by mouth 3 (three) times daily as needed (pain).  Dispense: 90 tablet; Refill: 2  -     ibuprofen (ADVIL,MOTRIN) 800 MG tablet; Take 1 tablet (800 mg total) by mouth every 12 (twelve) hours as needed for Pain.  Dispense: 60 tablet; Refill: 2    Acute  right-sided low back pain without sciatica  Comments:  new, needs in clinic [facility] ace[facility] conservative therapy  Orders:  -     methocarbamoL (ROBAXIN) 750 MG Tab; Take 1 tablet (750 mg total) by mouth 3 (three) times daily as needed (pain).  Dispense: 90 tablet; Refill: 2  -     ibuprofen (ADVIL,MOTRIN) 800 MG tablet; Take 1 tablet (800 mg total) by mouth every 12 (twelve) hours as needed for Pain.  Dispense: 60 tablet; Refill: 2    Secondary insomnia  Comments:  not controlled, see new med, stop tizanidine to avoid oversedation. Tizanidine has been replaced with robaxin.  Orders:  -     mirtazapine (REMERON) 15 MG tablet; Take 1 tablet (15 mg total) by mouth every evening.  Dispense: 30 tablet; Refill: 2    Chronic right shoulder pain  Comments:  controlled      DEPRESSION/Anxiety:  - Discontinued Zoloft due to adverse effects, including exacerbation of depressive symptoms.  - Continued Lexapro, to be taken in the morning only. Dose increased  - Prescribed metazepam for sleep, addressing both depression and insomnia.  - Educated the patient on potential side effects of antidepressants, including weight gain and sleep disturbances.  - Discussed the importance of human interaction and outdoor activities for mental health.  - Suggested seeing a psychiatrist for counseling.    HYPERTENSION:  - Noted improvement in blood pressure with current medication regimen, with a recent reading of 124/86.  - Instructed the patient to continue monitoring blood pressure at home and report results at next visit.  - Continued current medication regimen for hypertension.    ANXIETY:  - Discontinued Zoloft due to adverse effects, including exacerbation of anxiety symptoms.  - Adjusted medication regimen to address both depression and anxiety.  - Suggested seeing a psychiatrist for further evaluation.    INSOMNIA:  - Prescribed med for sleep, to be taken at night to address insomnia.  - Educated the patient on potential side effects of  antidepressants, including weight gain.    WEIGHT MANAGEMENT:  - Noted weight gain of 4 lbs, with current weight at 204 lbs.  - Discussed the impact of weight on joints and the need for calorie monitoring.  -Advised on lifestyle modification.  JOINT PAIN:  - Deferred physical exam and potential imaging for joint pain to next in-person visit.  - Prescribed Robaxin as a muscle relaxer for pain management.  - Advised on pain management with Tylenol or ibuprofen as needed.  - Scheduled follow up in 1 month for physical exam of knee.  FOLLOW UP:  - Scheduled a virtual follow-up visit in 1 month.  - Continue work-related walking for exercise.            I have reviewed all of the patient's clinical history available in care everywhere and Epic and have utilized this in my evaluation and management recommendations today.      Treatment options and alternatives were discussed with the patient. Patient was given ample time to ask questions. All questions were answered. Voices understanding and acceptance of this advice. Will call back if any further questions or concerns.         I spent a total of 45 minutes face to face and non-face to face on the date of this visit.This includes time preparing to see the patient (eg, review of tests, notes), obtaining and/or reviewing additional history from an independent historian and/or outside medical records, documenting clinical information in the electronic health record, independently interpreting results and/or communicating results to the patient/family/caregiver, or care coordinator.  Visit today included increased complexity associated with the care of the episodic problem addressed and managing the longitudinal care of the patient due to the serious and/or complex managed problem(s).    Portions of the record may have been created with voice recognition software. Occasional wrong-word or sound-a-like substitutions may have occurred due to the inherent limitations of voice  recognition software. Read the chart carefully and recognize, using context, where substitutions have occurred.      This note was generated with the assistance of ambient listening technology. Verbal consent was obtained by the patient and accompanying visitor(s) for the recording of patient appointment to facilitate this note. I attest to having reviewed and edited the generated note for accuracy, though some syntax or spelling errors may persist. Please contact the author of this note for any clarification.            Franci Arteaga MD  [facility],

## 2025-02-23 DIAGNOSIS — E66.812 CLASS 2 SEVERE OBESITY WITH SERIOUS COMORBIDITY AND BODY MASS INDEX (BMI) OF 38.0 TO 38.9 IN ADULT, UNSPECIFIED OBESITY TYPE: ICD-10-CM

## 2025-02-23 DIAGNOSIS — E66.01 CLASS 2 SEVERE OBESITY WITH SERIOUS COMORBIDITY AND BODY MASS INDEX (BMI) OF 38.0 TO 38.9 IN ADULT, UNSPECIFIED OBESITY TYPE: ICD-10-CM

## 2025-02-23 RX ORDER — SEMAGLUTIDE 2.68 MG/ML
2 INJECTION, SOLUTION SUBCUTANEOUS
Qty: 3 ML | Refills: 11 | Status: SHIPPED | OUTPATIENT
Start: 2025-02-23 | End: 2026-02-23

## 2025-02-24 ENCOUNTER — TELEPHONE (OUTPATIENT)
Dept: PRIMARY CARE CLINIC | Facility: CLINIC | Age: 50
End: 2025-02-24
Payer: MEDICAID

## 2025-02-24 NOTE — TELEPHONE ENCOUNTER
"Spoke with Magda and gave her the correct fax number to fax over scripts.       ----- Message from Janiya sent at 2/24/2025 10:54 AM CST -----  Contact: Magda/[facility] [phone]  Pharmacy is calling to clarify an RX.RX name:  semaglutide (OZEMPIC) 2 mg/dose (8 mg/3 mL) PnIjWhat do they need to clarify:  needs to be written as "Compounded" as well as why is she jumping from .25mg to 2mg's all at once? Comments:  A fax was sent this morning as well.Please call and advise.Thank You  "

## 2025-02-25 ENCOUNTER — TELEPHONE (OUTPATIENT)
Dept: PRIMARY CARE CLINIC | Facility: CLINIC | Age: 50
End: 2025-02-25
Payer: MEDICAID

## 2025-02-25 NOTE — TELEPHONE ENCOUNTER
Returned call to [facility] in regards to medication. Verbal was given during phone call. Pharmacy voiced understanding.    ----- Message from Aileen sent at 2/25/2025 11:47 AM CST -----  Contact: [facility]   Pharmacy is calling to clarify an RX.RX name:  semaglutide (OZEMPIC) 2 mg/dose (8 mg/3 mL) PnIjWhat do they need to clarify:  verify the dosageComments: Please advisePRES[facility] - [facility] - [id number] [place] [id number]: [phone] Fax: [phone]

## 2025-02-25 NOTE — TELEPHONE ENCOUNTER
I have attempted without success to contact this patient by phone to return their call. Paperwork work has been faxed.     ----- Message from Saritha sent at 2/25/2025  9:30 AM CST -----  Contact: Call back : [phone]  [email or web address] Patient is calling for Medical Advice regarding:[facility] is calling to get clarification on script semaglutide (OZEMPIC) 2 mg/dose (8 mg/3 mL) PnIj, stating a form faxed over on yesterday that provider needs to fill out Call back : [phone]: [phone] wants a call back or thru [facility]:Call back Please call and advise

## 2025-03-04 ENCOUNTER — OFFICE VISIT (OUTPATIENT)
Dept: PRIMARY CARE CLINIC | Facility: CLINIC | Age: 50
End: 2025-03-04
Payer: MEDICAID

## 2025-03-04 ENCOUNTER — HOSPITAL ENCOUNTER (OUTPATIENT)
Dept: RADIOLOGY | Facility: HOSPITAL | Age: 50
Discharge: HOME | End: 2025-03-04
Attending: NURSE PRACTITIONER
Payer: MEDICAID

## 2025-03-04 VITALS
TEMPERATURE: 99 F | DIASTOLIC BLOOD PRESSURE: 78 MMHG | OXYGEN SATURATION: 97 % | BODY MASS INDEX: 40.8 KG/M2 | HEIGHT: 60 IN | SYSTOLIC BLOOD PRESSURE: 120 MMHG | WEIGHT: 207.81 LBS | HEART RATE: 90 BPM

## 2025-03-04 DIAGNOSIS — G47.09 SECONDARY INSOMNIA: Chronic | ICD-10-CM

## 2025-03-04 DIAGNOSIS — G89.29 CHRONIC PAIN OF RIGHT KNEE: Primary | ICD-10-CM

## 2025-03-04 DIAGNOSIS — M25.561 CHRONIC PAIN OF RIGHT KNEE: ICD-10-CM

## 2025-03-04 DIAGNOSIS — M25.561 CHRONIC PAIN OF RIGHT KNEE: Primary | ICD-10-CM

## 2025-03-04 DIAGNOSIS — G89.29 CHRONIC PAIN OF RIGHT KNEE: ICD-10-CM

## 2025-03-04 DIAGNOSIS — I10 PRIMARY HYPERTENSION: ICD-10-CM

## 2025-03-04 DIAGNOSIS — R06.02 SHORTNESS OF BREATH: ICD-10-CM

## 2025-03-04 PROCEDURE — 99999 PR PBB SHADOW E&M-EST. PATIENT-LVL IV: CPT | Mod: PBBFAC,,, | Performed by: NURSE PRACTITIONER

## 2025-03-04 PROCEDURE — 73562 X-RAY EXAM OF KNEE 3: CPT | Mod: 26,RT,, | Performed by: RADIOLOGY

## 2025-03-04 PROCEDURE — 73562 X-RAY EXAM OF KNEE 3: CPT | Mod: TC,PN,RT

## 2025-03-04 PROCEDURE — 99214 OFFICE O/P EST MOD 30 MIN: CPT | Mod: PBBFAC,PN | Performed by: NURSE PRACTITIONER

## 2025-03-04 PROCEDURE — 71046 X-RAY EXAM CHEST 2 VIEWS: CPT | Mod: 26,,, | Performed by: RADIOLOGY

## 2025-03-04 PROCEDURE — 73560 X-RAY EXAM OF KNEE 1 OR 2: CPT | Mod: 26,59,LT, | Performed by: RADIOLOGY

## 2025-03-04 PROCEDURE — 71046 X-RAY EXAM CHEST 2 VIEWS: CPT | Mod: TC,PN

## 2025-03-04 RX ORDER — MIRTAZAPINE 15 MG/1
15 TABLET, FILM COATED ORAL NIGHTLY
Qty: 30 TABLET | Refills: 2 | Status: SHIPPED | OUTPATIENT
Start: 2025-03-04
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
## 2025-03-06 ENCOUNTER — RESULTS FOLLOW-UP (OUTPATIENT)
Dept: PRIMARY CARE CLINIC | Facility: CLINIC | Age: 50
End: 2025-03-06

## 2025-03-17 DIAGNOSIS — R07.9 CHEST PAIN, UNSPECIFIED TYPE: Primary | ICD-10-CM

## 2025-03-17 DIAGNOSIS — I10 PRIMARY HYPERTENSION: ICD-10-CM

## 2025-03-20 ENCOUNTER — RESULTS FOLLOW-UP (OUTPATIENT)
Dept: PRIMARY CARE CLINIC | Facility: CLINIC | Age: 50
End: 2025-03-20

## 2025-04-28 ENCOUNTER — PATIENT OUTREACH (OUTPATIENT)
Dept: ADMINISTRATIVE | Facility: HOSPITAL | Age: 50
End: 2025-04-28
Payer: MEDICAID

## 2025-05-12 ENCOUNTER — PATIENT OUTREACH (OUTPATIENT)
Dept: ADMINISTRATIVE | Facility: HOSPITAL | Age: 50
End: 2025-05-12
Payer: MEDICAID

## 2025-05-12 DIAGNOSIS — Z12.11 ENCOUNTER FOR COLORECTAL CANCER SCREENING: Primary | ICD-10-CM

## 2025-05-12 DIAGNOSIS — Z12.12 ENCOUNTER FOR COLORECTAL CANCER SCREENING: Primary | ICD-10-CM

## 2025-05-28 DIAGNOSIS — E66.01 CLASS 2 SEVERE OBESITY WITH SERIOUS COMORBIDITY AND BODY MASS INDEX (BMI) OF 38.0 TO 38.9 IN ADULT, UNSPECIFIED OBESITY TYPE: ICD-10-CM

## 2025-05-28 DIAGNOSIS — E66.812 CLASS 2 SEVERE OBESITY WITH SERIOUS COMORBIDITY AND BODY MASS INDEX (BMI) OF 38.0 TO 38.9 IN ADULT, UNSPECIFIED OBESITY TYPE: ICD-10-CM

## 2025-05-29 NOTE — TELEPHONE ENCOUNTER
Care Due:                  Date            Visit Type   Department     Provider  --------------------------------------------------------------------------------                                ESTABLISHED                              PATIENT -    [facility]  Last Visit: 02-      [facility]      CARE           Franci Arteaga  Next Visit: None Scheduled  None         None Found                                                            Last  Test          Frequency    Reason                     Performed    Due Date  --------------------------------------------------------------------------------    HBA1C.......  6 months...  semaglutide..............  12- 06-    [facility] Embedded Care Due Messages. Reference number: [id number].   5/28/2025 8:41:33 PM CDT

## 2025-06-10 ENCOUNTER — PATIENT MESSAGE (OUTPATIENT)
Dept: PRIMARY CARE CLINIC | Facility: CLINIC | Age: 50
End: 2025-06-10
Payer: MEDICAID

## 2025-06-23 RX ORDER — SEMAGLUTIDE 2.68 MG/ML
2 INJECTION, SOLUTION SUBCUTANEOUS
Qty: 3 ML | Refills: 11 | OUTPATIENT
Start: 2025-06-23 | End: 2026-06-23

## 2025-08-12 ENCOUNTER — PATIENT OUTREACH (OUTPATIENT)
Dept: ADMINISTRATIVE | Facility: HOSPITAL | Age: 50
End: 2025-08-12
Payer: MEDICAID

## 2025-08-13 ENCOUNTER — PATIENT MESSAGE (OUTPATIENT)
Dept: ADMINISTRATIVE | Facility: HOSPITAL | Age: 50
End: 2025-08-13
Payer: MEDICAID